# Patient Record
Sex: MALE | Race: OTHER | NOT HISPANIC OR LATINO | URBAN - METROPOLITAN AREA
[De-identification: names, ages, dates, MRNs, and addresses within clinical notes are randomized per-mention and may not be internally consistent; named-entity substitution may affect disease eponyms.]

---

## 2020-12-15 ENCOUNTER — INPATIENT (INPATIENT)
Facility: HOSPITAL | Age: 65
LOS: 2 days | Discharge: HOME | End: 2020-12-18
Attending: STUDENT IN AN ORGANIZED HEALTH CARE EDUCATION/TRAINING PROGRAM | Admitting: STUDENT IN AN ORGANIZED HEALTH CARE EDUCATION/TRAINING PROGRAM
Payer: MEDICARE

## 2020-12-15 VITALS
TEMPERATURE: 98 F | HEART RATE: 105 BPM | RESPIRATION RATE: 18 BRPM | DIASTOLIC BLOOD PRESSURE: 67 MMHG | OXYGEN SATURATION: 98 % | SYSTOLIC BLOOD PRESSURE: 112 MMHG

## 2020-12-15 DIAGNOSIS — I10 ESSENTIAL (PRIMARY) HYPERTENSION: ICD-10-CM

## 2020-12-15 DIAGNOSIS — E11.9 TYPE 2 DIABETES MELLITUS WITHOUT COMPLICATIONS: ICD-10-CM

## 2020-12-15 DIAGNOSIS — Z94.0 KIDNEY TRANSPLANT STATUS: ICD-10-CM

## 2020-12-15 DIAGNOSIS — Z02.9 ENCOUNTER FOR ADMINISTRATIVE EXAMINATIONS, UNSPECIFIED: ICD-10-CM

## 2020-12-15 DIAGNOSIS — E87.5 HYPERKALEMIA: ICD-10-CM

## 2020-12-15 DIAGNOSIS — Z29.9 ENCOUNTER FOR PROPHYLACTIC MEASURES, UNSPECIFIED: ICD-10-CM

## 2020-12-15 DIAGNOSIS — N17.9 ACUTE KIDNEY FAILURE, UNSPECIFIED: ICD-10-CM

## 2020-12-15 DIAGNOSIS — Z95.1 PRESENCE OF AORTOCORONARY BYPASS GRAFT: Chronic | ICD-10-CM

## 2020-12-15 DIAGNOSIS — K92.0 HEMATEMESIS: ICD-10-CM

## 2020-12-15 DIAGNOSIS — I25.10 ATHEROSCLEROTIC HEART DISEASE OF NATIVE CORONARY ARTERY WITHOUT ANGINA PECTORIS: ICD-10-CM

## 2020-12-15 DIAGNOSIS — Z94.0 KIDNEY TRANSPLANT STATUS: Chronic | ICD-10-CM

## 2020-12-15 LAB
ADD ON TEST-SPECIMEN IN LAB: SIGNIFICANT CHANGE UP
ALBUMIN SERPL ELPH-MCNC: 4 G/DL — SIGNIFICANT CHANGE UP (ref 3.3–5)
ALP SERPL-CCNC: 73 U/L — SIGNIFICANT CHANGE UP (ref 40–120)
ALT FLD-CCNC: 15 U/L — SIGNIFICANT CHANGE UP (ref 4–41)
ANION GAP SERPL CALC-SCNC: 10 MMOL/L — SIGNIFICANT CHANGE UP (ref 7–14)
ANION GAP SERPL CALC-SCNC: 12 MMOL/L — SIGNIFICANT CHANGE UP (ref 7–14)
APPEARANCE UR: CLEAR — SIGNIFICANT CHANGE UP
APTT BLD: 31.5 SEC — SIGNIFICANT CHANGE UP (ref 27–36.3)
AST SERPL-CCNC: 13 U/L — SIGNIFICANT CHANGE UP (ref 4–40)
BASOPHILS # BLD AUTO: 0.06 K/UL — SIGNIFICANT CHANGE UP (ref 0–0.2)
BASOPHILS NFR BLD AUTO: 0.5 % — SIGNIFICANT CHANGE UP (ref 0–2)
BILIRUB SERPL-MCNC: 0.9 MG/DL — SIGNIFICANT CHANGE UP (ref 0.2–1.2)
BILIRUB UR-MCNC: NEGATIVE — SIGNIFICANT CHANGE UP
BLD GP AB SCN SERPL QL: NEGATIVE — SIGNIFICANT CHANGE UP
BLOOD GAS VENOUS COMPREHENSIVE RESULT: SIGNIFICANT CHANGE UP
BUN SERPL-MCNC: 66 MG/DL — HIGH (ref 7–23)
BUN SERPL-MCNC: 72 MG/DL — HIGH (ref 7–23)
CALCIUM SERPL-MCNC: 9.1 MG/DL — SIGNIFICANT CHANGE UP (ref 8.4–10.5)
CALCIUM SERPL-MCNC: 9.3 MG/DL — SIGNIFICANT CHANGE UP (ref 8.4–10.5)
CHLORIDE SERPL-SCNC: 101 MMOL/L — SIGNIFICANT CHANGE UP (ref 98–107)
CHLORIDE SERPL-SCNC: 105 MMOL/L — SIGNIFICANT CHANGE UP (ref 98–107)
CK MB BLD-MCNC: 8.4 % — HIGH (ref 0–2.5)
CK MB CFR SERPL CALC: 5.6 NG/ML — SIGNIFICANT CHANGE UP
CK SERPL-CCNC: 67 U/L — SIGNIFICANT CHANGE UP (ref 30–200)
CO2 SERPL-SCNC: 24 MMOL/L — SIGNIFICANT CHANGE UP (ref 22–31)
CO2 SERPL-SCNC: 24 MMOL/L — SIGNIFICANT CHANGE UP (ref 22–31)
COLOR SPEC: YELLOW — SIGNIFICANT CHANGE UP
CREAT SERPL-MCNC: 1.64 MG/DL — HIGH (ref 0.5–1.3)
CREAT SERPL-MCNC: 1.67 MG/DL — HIGH (ref 0.5–1.3)
DIFF PNL FLD: NEGATIVE — SIGNIFICANT CHANGE UP
EOSINOPHIL # BLD AUTO: 0.03 K/UL — SIGNIFICANT CHANGE UP (ref 0–0.5)
EOSINOPHIL NFR BLD AUTO: 0.2 % — SIGNIFICANT CHANGE UP (ref 0–6)
GLUCOSE BLDC GLUCOMTR-MCNC: 278 MG/DL — HIGH (ref 70–99)
GLUCOSE SERPL-MCNC: 110 MG/DL — HIGH (ref 70–99)
GLUCOSE SERPL-MCNC: 280 MG/DL — HIGH (ref 70–99)
GLUCOSE UR QL: ABNORMAL
HCT VFR BLD CALC: 36.4 % — LOW (ref 39–50)
HGB BLD-MCNC: 11.3 G/DL — LOW (ref 13–17)
IANC: 9.11 K/UL — HIGH (ref 1.5–8.5)
IMM GRANULOCYTES NFR BLD AUTO: 0.5 % — SIGNIFICANT CHANGE UP (ref 0–1.5)
INR BLD: 1.38 RATIO — HIGH (ref 0.88–1.17)
KETONES UR-MCNC: ABNORMAL
LEUKOCYTE ESTERASE UR-ACNC: NEGATIVE — SIGNIFICANT CHANGE UP
LYMPHOCYTES # BLD AUTO: 1.25 K/UL — SIGNIFICANT CHANGE UP (ref 1–3.3)
LYMPHOCYTES # BLD AUTO: 9.9 % — LOW (ref 13–44)
MCHC RBC-ENTMCNC: 28.4 PG — SIGNIFICANT CHANGE UP (ref 27–34)
MCHC RBC-ENTMCNC: 31 GM/DL — LOW (ref 32–36)
MCV RBC AUTO: 91.5 FL — SIGNIFICANT CHANGE UP (ref 80–100)
MONOCYTES # BLD AUTO: 2.13 K/UL — HIGH (ref 0–0.9)
MONOCYTES NFR BLD AUTO: 16.9 % — HIGH (ref 2–14)
NEUTROPHILS # BLD AUTO: 9.11 K/UL — HIGH (ref 1.8–7.4)
NEUTROPHILS NFR BLD AUTO: 72 % — SIGNIFICANT CHANGE UP (ref 43–77)
NITRITE UR-MCNC: NEGATIVE — SIGNIFICANT CHANGE UP
NRBC # BLD: 0 /100 WBCS — SIGNIFICANT CHANGE UP
NRBC # FLD: 0 K/UL — SIGNIFICANT CHANGE UP
NT-PROBNP SERPL-SCNC: 2888 PG/ML — HIGH
PH UR: 5.5 — SIGNIFICANT CHANGE UP (ref 5–8)
PLATELET # BLD AUTO: 133 K/UL — LOW (ref 150–400)
POTASSIUM SERPL-MCNC: 4.8 MMOL/L — SIGNIFICANT CHANGE UP (ref 3.5–5.3)
POTASSIUM SERPL-MCNC: 6.2 MMOL/L — CRITICAL HIGH (ref 3.5–5.3)
POTASSIUM SERPL-SCNC: 4.8 MMOL/L — SIGNIFICANT CHANGE UP (ref 3.5–5.3)
POTASSIUM SERPL-SCNC: 6.2 MMOL/L — CRITICAL HIGH (ref 3.5–5.3)
PROT SERPL-MCNC: 6.1 G/DL — SIGNIFICANT CHANGE UP (ref 6–8.3)
PROT UR-MCNC: ABNORMAL
PROTHROM AB SERPL-ACNC: 15.6 SEC — HIGH (ref 9.8–13.1)
RBC # BLD: 3.98 M/UL — LOW (ref 4.2–5.8)
RBC # FLD: 15 % — HIGH (ref 10.3–14.5)
RH IG SCN BLD-IMP: POSITIVE — SIGNIFICANT CHANGE UP
SARS-COV-2 RNA SPEC QL NAA+PROBE: SIGNIFICANT CHANGE UP
SODIUM SERPL-SCNC: 135 MMOL/L — SIGNIFICANT CHANGE UP (ref 135–145)
SODIUM SERPL-SCNC: 141 MMOL/L — SIGNIFICANT CHANGE UP (ref 135–145)
SP GR SPEC: 1.02 — SIGNIFICANT CHANGE UP (ref 1.01–1.02)
TROPONIN T, HIGH SENSITIVITY RESULT: 30 NG/L — SIGNIFICANT CHANGE UP
TROPONIN T, HIGH SENSITIVITY RESULT: 36 NG/L — SIGNIFICANT CHANGE UP
UROBILINOGEN FLD QL: SIGNIFICANT CHANGE UP
WBC # BLD: 12.64 K/UL — HIGH (ref 3.8–10.5)
WBC # FLD AUTO: 12.64 K/UL — HIGH (ref 3.8–10.5)

## 2020-12-15 PROCEDURE — 71045 X-RAY EXAM CHEST 1 VIEW: CPT | Mod: 26

## 2020-12-15 PROCEDURE — 76770 US EXAM ABDO BACK WALL COMP: CPT | Mod: 26

## 2020-12-15 PROCEDURE — 99285 EMERGENCY DEPT VISIT HI MDM: CPT

## 2020-12-15 RX ORDER — MAGNESIUM HYDROXIDE 400 MG/1
30 TABLET, CHEWABLE ORAL DAILY
Refills: 0 | Status: DISCONTINUED | OUTPATIENT
Start: 2020-12-15 | End: 2020-12-18

## 2020-12-15 RX ORDER — ATORVASTATIN CALCIUM 80 MG/1
20 TABLET, FILM COATED ORAL AT BEDTIME
Refills: 0 | Status: DISCONTINUED | OUTPATIENT
Start: 2020-12-15 | End: 2020-12-18

## 2020-12-15 RX ORDER — DEXTROSE 50 % IN WATER 50 %
15 SYRINGE (ML) INTRAVENOUS ONCE
Refills: 0 | Status: DISCONTINUED | OUTPATIENT
Start: 2020-12-15 | End: 2020-12-18

## 2020-12-15 RX ORDER — GLUCAGON INJECTION, SOLUTION 0.5 MG/.1ML
1 INJECTION, SOLUTION SUBCUTANEOUS ONCE
Refills: 0 | Status: DISCONTINUED | OUTPATIENT
Start: 2020-12-15 | End: 2020-12-18

## 2020-12-15 RX ORDER — TACROLIMUS 5 MG/1
1 CAPSULE ORAL EVERY 12 HOURS
Refills: 0 | Status: DISCONTINUED | OUTPATIENT
Start: 2020-12-15 | End: 2020-12-18

## 2020-12-15 RX ORDER — DEXTROSE 50 % IN WATER 50 %
25 SYRINGE (ML) INTRAVENOUS ONCE
Refills: 0 | Status: DISCONTINUED | OUTPATIENT
Start: 2020-12-15 | End: 2020-12-18

## 2020-12-15 RX ORDER — SODIUM CHLORIDE 9 MG/ML
1000 INJECTION INTRAMUSCULAR; INTRAVENOUS; SUBCUTANEOUS
Refills: 0 | Status: DISCONTINUED | OUTPATIENT
Start: 2020-12-15 | End: 2020-12-16

## 2020-12-15 RX ORDER — FUROSEMIDE 40 MG
40 TABLET ORAL ONCE
Refills: 0 | Status: COMPLETED | OUTPATIENT
Start: 2020-12-15 | End: 2020-12-15

## 2020-12-15 RX ORDER — ACETAMINOPHEN 500 MG
975 TABLET ORAL ONCE
Refills: 0 | Status: COMPLETED | OUTPATIENT
Start: 2020-12-15 | End: 2020-12-15

## 2020-12-15 RX ORDER — MYCOPHENOLATE MOFETIL 250 MG/1
500 CAPSULE ORAL
Refills: 0 | Status: DISCONTINUED | OUTPATIENT
Start: 2020-12-15 | End: 2020-12-17

## 2020-12-15 RX ORDER — INSULIN GLARGINE 100 [IU]/ML
8 INJECTION, SOLUTION SUBCUTANEOUS AT BEDTIME
Refills: 0 | Status: DISCONTINUED | OUTPATIENT
Start: 2020-12-15 | End: 2020-12-15

## 2020-12-15 RX ORDER — FOLIC ACID 0.8 MG
1 TABLET ORAL DAILY
Refills: 0 | Status: DISCONTINUED | OUTPATIENT
Start: 2020-12-15 | End: 2020-12-18

## 2020-12-15 RX ORDER — INSULIN LISPRO 100/ML
VIAL (ML) SUBCUTANEOUS EVERY 6 HOURS
Refills: 0 | Status: DISCONTINUED | OUTPATIENT
Start: 2020-12-15 | End: 2020-12-16

## 2020-12-15 RX ORDER — SODIUM ZIRCONIUM CYCLOSILICATE 10 G/10G
10 POWDER, FOR SUSPENSION ORAL ONCE
Refills: 0 | Status: COMPLETED | OUTPATIENT
Start: 2020-12-15 | End: 2020-12-15

## 2020-12-15 RX ORDER — CHOLECALCIFEROL (VITAMIN D3) 125 MCG
1 CAPSULE ORAL
Qty: 0 | Refills: 0 | DISCHARGE

## 2020-12-15 RX ORDER — INSULIN HUMAN 100 [IU]/ML
5 INJECTION, SOLUTION SUBCUTANEOUS ONCE
Refills: 0 | Status: COMPLETED | OUTPATIENT
Start: 2020-12-15 | End: 2020-12-15

## 2020-12-15 RX ORDER — PANTOPRAZOLE SODIUM 20 MG/1
40 TABLET, DELAYED RELEASE ORAL EVERY 12 HOURS
Refills: 0 | Status: DISCONTINUED | OUTPATIENT
Start: 2020-12-15 | End: 2020-12-16

## 2020-12-15 RX ORDER — SODIUM CHLORIDE 9 MG/ML
1000 INJECTION, SOLUTION INTRAVENOUS
Refills: 0 | Status: DISCONTINUED | OUTPATIENT
Start: 2020-12-15 | End: 2020-12-18

## 2020-12-15 RX ORDER — DEXTROSE 50 % IN WATER 50 %
12.5 SYRINGE (ML) INTRAVENOUS ONCE
Refills: 0 | Status: DISCONTINUED | OUTPATIENT
Start: 2020-12-15 | End: 2020-12-18

## 2020-12-15 RX ORDER — PANTOPRAZOLE SODIUM 20 MG/1
80 TABLET, DELAYED RELEASE ORAL ONCE
Refills: 0 | Status: COMPLETED | OUTPATIENT
Start: 2020-12-15 | End: 2020-12-15

## 2020-12-15 RX ORDER — ONDANSETRON 8 MG/1
4 TABLET, FILM COATED ORAL ONCE
Refills: 0 | Status: COMPLETED | OUTPATIENT
Start: 2020-12-15 | End: 2020-12-15

## 2020-12-15 RX ORDER — CHOLECALCIFEROL (VITAMIN D3) 125 MCG
2000 CAPSULE ORAL DAILY
Refills: 0 | Status: DISCONTINUED | OUTPATIENT
Start: 2020-12-15 | End: 2020-12-18

## 2020-12-15 RX ADMIN — Medication 40 MILLIGRAM(S): at 20:38

## 2020-12-15 RX ADMIN — INSULIN HUMAN 5 UNIT(S): 100 INJECTION, SOLUTION SUBCUTANEOUS at 19:22

## 2020-12-15 RX ADMIN — Medication 975 MILLIGRAM(S): at 19:22

## 2020-12-15 RX ADMIN — ONDANSETRON 4 MILLIGRAM(S): 8 TABLET, FILM COATED ORAL at 19:21

## 2020-12-15 RX ADMIN — SODIUM CHLORIDE 50 MILLILITER(S): 9 INJECTION INTRAMUSCULAR; INTRAVENOUS; SUBCUTANEOUS at 23:35

## 2020-12-15 RX ADMIN — PANTOPRAZOLE SODIUM 80 MILLIGRAM(S): 20 TABLET, DELAYED RELEASE ORAL at 19:22

## 2020-12-15 RX ADMIN — SODIUM ZIRCONIUM CYCLOSILICATE 10 GRAM(S): 10 POWDER, FOR SUSPENSION ORAL at 19:22

## 2020-12-15 NOTE — ED ADULT NURSE NOTE - OBJECTIVE STATEMENT
pt received to room 2, a&ox 3, ambulatory, pmh of HTN, p/w vomiting blood x1 this morning. pt reports large amount of vomit with dark blood. pt reports nausea before vomiting, denies nausea at this time. pt reports 2BM yesterday, denies dark tarry stools. Pt breathing even and unlabored on room air. NSR on cardiac monitor. Denies fever, chills, cough, SOB, chest pain, palpitations, dizziness, numbness, tingling. EKG in chart. pending lab collection.

## 2020-12-15 NOTE — ED PROVIDER NOTE - CLINICAL SUMMARY MEDICAL DECISION MAKING FREE TEXT BOX
65M hx triple bypass and kidney transplant 4-5 yrs ago on prograf daily presents with hematemesis x1 this AM and back pain. poor appetite since transplant. US transplanted kidney to eval for rejection, likely upper gi bleed so give protonix and zofran, Plan: Cardiac Monitor, EKG, Labs/cardiac enzymes, CXR and admit to telemetry for further workup. Lito att: 65M hx triple bypass and kidney transplant 4-5 yrs ago on prograf daily presents with hematemesis x1 this AM and back pain. poor appetite since transplant. US transplanted kidney to eval for rejection, likely upper gi bleed so give protonix and zofran, Plan: Cardiac Monitor, EKG, Labs/cardiac enzymes, CXR and admit to telemetry for further workup.

## 2020-12-15 NOTE — H&P ADULT - PROBLEM SELECTOR PLAN 7
Reportedly taking isosorbide 60mg and carvedilol 25mg BID  - BP stable on this admission, unclear baseline BP  - hold anti-hypertensives in setting of acute upper GI bleed Reportedly taking isosorbide 60mg and carvedilol 25mg BID  - BP stable on this admission, unclear baseline BP  - hold anti-hypertensives in setting of acute upper GI bleed  - restart BP if hypertensive overnight

## 2020-12-15 NOTE — H&P ADULT - NSHPPHYSICALEXAM_GEN_ALL_CORE
Vital Signs (24 Hrs):  T(C): 37.2 (12-15-20 @ 20:48), Max: 37.2 (12-15-20 @ 20:48)  HR: 91 (12-15-20 @ 20:48) (91 - 105)  BP: 124/65 (12-15-20 @ 20:48) (112/67 - 204/133)  RR: 16 (12-15-20 @ 20:48) (16 - 18)  SpO2: 99% (12-15-20 @ 20:48) (98% - 100%)  Wt(kg): --  PHYSICAL EXAM:  GENERAL: NAD, lying in bed comfortably  HEAD:  Atraumatic, Normocephalic  EYES: EOMI, PERRLA, conjunctiva and sclera clear  ENT: Moist mucous membranes  NECK: Supple, No JVD  CHEST/LUNG: Clear to auscultation bilaterally; No rales, rhonchi, wheezing, or rubs. Unlabored respirations  HEART: Regular rate and rhythm; No murmurs, rubs, or gallops  ABDOMEN: Bowel sounds present; Soft, Nontender, Nondistended   EXTREMITIES:  2+ Peripheral Pulses, brisk capillary refill. No clubbing, cyanosis, or edema  NERVOUS SYSTEM:  Alert & Oriented X3, speech clear. No deficits   MSK: FROM all 4 extremities, full and equal strength  SKIN: No rashes or lesions, LUE fistula thrill present

## 2020-12-15 NOTE — H&P ADULT - PROBLEM SELECTOR PLAN 3
Unclear of baseline Crt, all records from [facility], but pt reportedl still urinating  - Check urine lytes for now, kidney US showing no rejection   - monitor on gentle hydration while NPO  - trend tacro level   - Obtain records from [facility]  - f/u renal c/s

## 2020-12-15 NOTE — ED PROVIDER NOTE - CARE PLAN
Principal Discharge DX:	Hematemesis  Secondary Diagnosis:	Back pain   Principal Discharge DX:	Hematemesis  Secondary Diagnosis:	Back pain  Secondary Diagnosis:	Hyperkalemia

## 2020-12-15 NOTE — ED ADULT NURSE REASSESSMENT NOTE - NS ED NURSE REASSESS COMMENT FT1
Report received from day RN. Pt in no acute distress and offering no complaints. Pt medicated with Lasix per Md orders. Repeat BMP drawn and sent. Will continue to monitor.

## 2020-12-15 NOTE — H&P ADULT - NSHPSOCIALHISTORY_GEN_ALL_CORE
Pt denies smoking, drinking, recreational drug use, lives at home w/ wife, son and daughter-in-law, currently retired.

## 2020-12-15 NOTE — H&P ADULT - NSRESEARCHGRANTASSESS_GEN_A_CORE
Not applicable: This is a surgical and/or non-medical patient <<--- Click to launch IMPROVE-DD VTE Assessment

## 2020-12-15 NOTE — H&P ADULT - PROBLEM SELECTOR PLAN 1
1x episode of hematemesis, Hg stable at 11, although unclear baseline, no prior events, Likely upper GI bleeding  - GI c/s placed, possible plan for EGD tmrw in AM  - protonix 40mg IV BID, if another episode of hematemesis, transition to protonoix gtt  - NPO for now  - Two large-bore IV  - Maintain active T+S  - start maintenance fluids w/ NS at 50cc/hr while NPO  - q8 CBC for now, transfuse to HG >8 1x episode of hematemesis, Hg stable at 11, although unclear baseline, no prior events, Likely upper GI bleeding  - GI c/s placed, possible plan for EGD tmrw in AM  - protonix 40mg IV BID, if another episode of hematemesis, transition to protonoix gtt  - NPO for now  - Two large-bore IV  - Maintain active T+S  - start maintenance fluids w/ NS at 50cc/hr while NPO  - q8 CBC for now, transfuse to HG >8  - hold ASA

## 2020-12-15 NOTE — H&P ADULT - PROBLEM SELECTOR PLAN 9
Problem: Discharge planning issues. Plan; Transitions of Care Status:  1. Name of PCP:  2. PCP contacted on Admission: []Y []N  3. PCP contacted at Discharge: []Y []N  4. Post-Discharge Appointment Date and Location:   5. Summary of Handoff given to PCP: Problem: Discharge planning issues. Plan; Transitions of Care Status:  1. Name of PCP: Barbara Fontaine?   2. PCP contacted on Admission: []Y []N  3. PCP contacted at Discharge: []Y []N  4. Post-Discharge Appointment Date and Location:   5. Summary of Handoff given to PCP:

## 2020-12-15 NOTE — ED PROVIDER NOTE - OBJECTIVE STATEMENT
65M hx triple bypass on daily asa and kidney transplant 4-5 yrs ago on prograf daily presents with hematemesis x1 this AM and back pain. poor appetite since transplant. Patient denies chest pain, SOB, f/c, cough, abd pain, Diarrhea/Constipation, weakness, HA, dizziness, urinary symptoms, extremity pain or swelling or other complaints.

## 2020-12-15 NOTE — H&P ADULT - PROBLEM SELECTOR PLAN 6
H/o T2DM, reportedly taking lantus 16 units and novolog 4units premeals  - f/u HgA1C  - monitor on low dose ISS q6 while NPO  - lantus 8 units bedtime while NPO H/o T2DM, reportedly taking lantus 16 units and novolog 4units premeals  - f/u HgA1C  - monitor on low dose ISS q6 while NPO  - hold lantus for now H/o T2DM, reportedly taking lantus 16 units and novolog 4units premeals  - f/u HgA1C  - monitor on low dose ISS q6 while NPO  - lantus 8 while NPO H/o T2DM, reportedly taking lantus 16 units and novolog 4units premeals  - f/u HgA1C  - monitor on low dose ISS q6 while NPO  - Lantus 8 units x 1 SQ tonight, while NPO

## 2020-12-15 NOTE — H&P ADULT - HISTORY OF PRESENT ILLNESS
Pt is a 64 yo M w/ h/o CAD w/ 1x NATALIE, CABG in 2005, R kidney transplant in 2015 on prograf and cellcept, DM, HTN presenting for 1x episode of hematemesis. Pt reports waking this morning w/ 1 episode of "small amount" of bloody vomit. Pt reports that this has never happened to him before. Pt reports that he is only on ASA since the CABG, and otherwise he is not on any other blood thinners. Pt reports that he got both of his CABG and kidney transplant in [facility] (no records in [facility]IE or allscripts). Pt otherwise is complaining of mild lower back pain. Otherwise, denies fevers, chills, chest p/p, sob, diarrhea, sick contacts, recent travel.     At the ED, pt vitals: /67, , saturating well on RA, afebrile. CBC sig for mild leukocytosis to 12, Hg 11 (unclear baseline), CMP sig for K of 6.2, Crt 1.64 (unclear baseline), proBNP elevated at 2888. US kidney showing no rejection, CXR clear. Given insulin 5units, lasix 40mg IV, lokelma 10, admitted to medicine for further observation/workup Pt is a 64 yo M w/ h/o CAD w/ 1x NATALIE, CABG in 2005, R kidney transplant in 2015 on prednisone, prograf and cellcept, T2DM, HTN presenting for 1x episode of hematemesis. Pt reports waking this morning w/ 1 episode of "small amount" of bloody vomit. Pt reports that this has never happened to him before. Pt reports that he is only on ASA since the CABG, and otherwise he is not on any other blood thinners. Pt reports that he got both of his CABG and kidney transplant in [place], [place] (no records in HIE or allscripts). Pt otherwise is complaining of mild lower back pain. Otherwise, denies fevers, chills, chest p/p, sob, diarrhea, sick contacts, recent travel.     At the ED, pt vitals: /67, , saturating well on RA, afebrile. CBC sig for mild leukocytosis to 12, Hg 11 (unclear baseline), CMP sig for K of 6.2, Crt 1.64 (unclear baseline), proBNP elevated at 2888. US kidney showing no rejection, CXR clear. Pt seen by renal, given insulin 5units, lasix 40mg IV, lokelma 10, admitted to medicine for further observation/workup

## 2020-12-15 NOTE — H&P ADULT - PROBLEM SELECTOR PLAN 8
GOC: full code  DVT prophylaxis: SCDS for now  Activity: ambulate as tolerated  Diet: NPO except meds GOC: full code  DVT prophylaxis: SCDS for now  Activity: ambulate as tolerated  Diet: NPO except meds  Fall, Asp precaution

## 2020-12-15 NOTE — H&P ADULT - PROBLEM SELECTOR PLAN 5
Transplanted in 2015, on immunosuppresives at home  - Kidney US showing no rejection  - c/w prograf 1mg BID, cellcept 500mg BID, prednisone 2.5 BID  - trend tacro level  - f/u transplant renal c/s

## 2020-12-15 NOTE — H&P ADULT - NSHPLABSRESULTS_GEN_ALL_CORE
LABS: Personally reviewed labs, imaging, and ECG                          11.3   12.64 )-----------( 133      ( 15 Dec 2020 17:39 )             36.4       12-15    141  |  105  |  72<H>  ----------------------------<  110<H>  4.8   |  24  |  1.67<H>    Ca    9.3      15 Dec 2020 21:29    TPro  6.1  /  Alb  4.0  /  TBili  0.9  /  DBili  x   /  AST  13  /  ALT  15  /  AlkPhos  73  12-15     LIVER FUNCTIONS - ( 15 Dec 2020 17:39 )  Alb: 4.0 g/dL / Pro: 6.1 g/dL / ALK PHOS: 73 U/L / ALT: 15 U/L / AST: 13 U/L / GGT: x           Urinalysis Basic - ( 15 Dec 2020 18:54 )    Color: Yellow / Appearance: Clear / S.025 / pH: x  Gluc: x / Ketone: Small  / Bili: Negative / Urobili: <2 mg/dL   Blood: x / Protein: Trace / Nitrite: Negative   Leuk Esterase: Negative / RBC: x / WBC x   Sq Epi: x / Non Sq Epi: x / Bacteria: x    PT/INR - ( 15 Dec 2020 17:39 )   PT: 15.6 sec;   INR: 1.38 ratio         PTT - ( 15 Dec 2020 17:39 )  PTT:31.5 sec    Lactate Trend    CARDIAC MARKERS ( 15 Dec 2020 19:55 )  x     / x     / 67 U/L / x     / 5.6 ng/mL    CAPILLARY BLOOD GLUCOSE    POCT Blood Glucose.: 248 mg/dL (15 Dec 2020 19:21)    RADIOLOGY & ADDITIONAL TESTS:

## 2020-12-15 NOTE — H&P ADULT - ATTENDING COMMENTS
60 y/o male HX of CAD, S/P Stent x 1, CABG, Rt Kidney Transplant on PO Prednisone, Prograf, Cellcept, DM type 2, HTN, high Cholesterol , pt c/o Hematemesis x 1 episode, pt is on ASA, Mild LBP, no fever, NO CP, NO SOB, no diarrhea, + Mild Leukocytosis, + Hyperkalemia, IZZY vs. CKD, Elevated BNP, Pt s/p Insulin 5 units x 1 IVP, Lasix 40 mg IVP x 1, Lokelma 10 gm po X 1, HOUSE [facility]al & [facility] were called tonight, Renal sonogram: No Hydro, No stone, s/P Rt Renal Transplant,  Mg 1.5 and Phos 1.3, , BUN 80, Creatinine 1.67, COVID 19 PCR Neg., BNP 2888, K+ 6.2--->5.0, WBC 12.64, Hgb 11.3, Platelet 133,     Fall/aspiration precaution, IVF NS @ 50 cc/hr x 12 HR, ECHO, Protonix 40 mg IVP BID, NPO Except meds, f/u CBC Closely, Hold ASA for now, Venodyne for DVT Prophylaxis, Hold Pre Meal Insulin, F/U CBC, CMP, Mg, Phos, Hep A,B,C profile, HgbA1c, TSH, Free T4, Fasting Lipid, Iron studies, Ferritin, Vit B12, Folate,   on Lipitor, on Prograf, Prednisone, Cellcept, Lantus 8 units SQ x 1 tonight, Hold BP Meds due to low BP and possible GI Bleed, Type & screen, Prograf level, PT consult,    Case D/w Pt and HS,    Pt was seen & evaluated by me, Dr. LAMIN Adams on 12/16/2020.

## 2020-12-15 NOTE — H&P ADULT - NSICDXPASTMEDICALHX_GEN_ALL_CORE_FT
PAST MEDICAL HISTORY:  CAD (coronary artery disease)      PAST MEDICAL HISTORY:  CAD (coronary artery disease)     DM (diabetes mellitus) Type 2    High cholesterol     HTN (hypertension)

## 2020-12-15 NOTE — H&P ADULT - PROBLEM SELECTOR PLAN 4
Reportedly had MI in the past w/ 1x NATALIE, s/p CABG, on ASA at home  - hold ASA in setting of GI bleed  - c/w atorvastatin for now

## 2020-12-15 NOTE — H&P ADULT - PROBLEM SELECTOR PLAN 2
Found to have potassium of 6.2 at ED, EKG wnl, possibly 2/2 in setting of ?IZZY on CKD, vs ?new IZZY  - s/p insulin, and 40mg lasix, lokelma 10mg x1  - f/u repeat BMP  - f/u renal c/s

## 2020-12-15 NOTE — ED PROVIDER NOTE - NS ED ROS FT
Constitutional: no fevers, no chills.  Eyes: no visual changes.  Ears: no ear drainage, no ear pain.  Nose: no nasal congestion.  Mouth/Throat: no sore throat.  Cardiovascular: no chest pain.  Respiratory: no shortness of breath, no wheezing, no cough  Gastrointestinal: +nausea, +vomiting, no diarrhea, no abdominal pain.  MSK: no flank pain, +back pain.  Genitourinary: no dysuria, no hematuria.  Skin: no rashes.  Neuro: no headache

## 2020-12-15 NOTE — ED PROVIDER NOTE - PHYSICAL EXAMINATION
GEN: Well appearing, well nourished, in no apparent distress.  HEAD: NCAT  HEENT: PERRL, Airway patent, EOMI, non-erythematous pharynx, no exudates, uvula midline, MMM, neck supple, no LAD, no JVD  LUNG: CTAB, no adventitious sounds, no retractions, no nasal flaring  CV: RRR, no murmurs,   Abd: soft, NTND, no rebound or guarding, BS+ in all quadrants, no CVAT  MSK: WWP, Pulses 2+ in extremities, No edema   Neuro:  AAOx3, Ambulatory with stable gait. JARRELL without laterality  Skin: Warm and dry, no evidence of rash  Psych: normal mood and affect

## 2020-12-15 NOTE — H&P ADULT - NSHPREVIEWOFSYSTEMS_GEN_ALL_CORE
REVIEW OF SYSTEMS:  CONSTITUTIONAL: No weakness, fevers or chills  EYES: No visual changes;  No vertigo   ENT: No throat pain, no runny nose   NECK: No pain or stiffness  RESPIRATORY: No cough, wheezing, hemoptysis; No shortness of breath  CARDIOVASCULAR: No chest pain or palpitations  GASTROINTESTINAL: +hematemesis, No abdominal or epigastric pain. No nausea, vomiting, No diarrhea or constipation. No melena or hematochezia.  GENITOURINARY: No dysuria, frequency or hematuria  NEUROLOGICAL: No numbness or weakness  SKIN: No itching, rashes  MUSCULOSKELETAL: Mild back pain  PSYCH: No mood changes, suicidal ideation

## 2020-12-15 NOTE — CHART NOTE - NSCHARTNOTEFT_GEN_A_CORE
Pt is a 66 y/o M w PMH of kidney transplant 4-5 years ago (on prograf, primary nephrologist in [place]), CAD s/p triple bypass presented to ED c/o of 1 episode of hematemesis this am. Nephrology consulted for transplant management and hyperkalemia.    Upon review of labs on [facility], patient noted to have initial K of 6.2 (non-hemolyzed), no EKG changes as per primary team, with Scr of 1.64. S/p Lokelma 10g and insulin 5 unit IVP. Repeat K on [facility] noted to be 4.9. Kidney u/s done w transplant kidney on RLQ w/o hydronephrosis or stone.     As per ED team, patient appears to stable. VS noted significantly elevated BP of 204/133?, , O2 sat 100 percent on RA, RR of 17. As per ED note, patient does not c/o of HA or SOB. C/o of nausea/vomiting.     Recommendations  Continue to monitor K level  Obtain baseline Scr   Suggest to give 1 dose of lasix 40 mg IVP if patient is not clinically hypovolemic   Obtain tacrolimus level   Nephrology will follow  Will consult note to follow tomorrow    If any questions, please feel free to contact me     Michelle Muniz  Nephrology Fellow  [facility] Pager: [phone]  [facility] Pager: [phone] Pt is a 66 y/o M w PMH of kidney transplant 4-5 years ago (on prograf, primary nephrologist in [place]), CAD s/p triple bypass presented to ED c/o of 1 episode of hematemesis this am. Nephrology consulted for transplant management and hyperkalemia.    Upon review of labs on [facility], patient noted to have initial K of 6.2 (non-hemolyzed), no EKG changes as per primary team, with Scr of 1.64. S/p Lokelma 10g and insulin 5 unit IVP. Repeat K on [facility] noted to be 4.9. Kidney u/s done w transplant kidney on RLQ w/o hydronephrosis or stone.     As per ED team, patient appears to stable. VS noted significantly elevated BP of 204/133?, , O2 sat 100 percent on RA, RR of 17. As per ED note, patient does not c/o of HA or SOB. C/o of nausea/vomiting.     Recommendations  Continue to monitor K level  Obtain baseline Scr   Suggest to give 1 dose of lasix 40 mg IVP if patient is not clinically hypovolemic   Obtain tacrolimus trough level   Reconcile medications  Resume transplant home medications  Resume home BP meds   Nephrology will follow  Full consult note to follow tomorrow    If any questions, please feel free to contact me     Michelle Muniz  Nephrology Fellow  [facility] Pager: [phone]  [facility] Pager: [phone] Pt is a 66 y/o M w PMH of kidney transplant 4-5 years ago (on prograf, primary nephrologist in [place]), CAD s/p triple bypass presented to ED c/o of 1 episode of hematemesis this am. Nephrology consulted for transplant management and hyperkalemia.    Upon review of labs on [facility], patient noted to have initial K of 6.2 (non-hemolyzed), no EKG changes as per primary team, with Scr of 1.64. S/p Lokelma 10g and insulin 5 unit IVP. Repeat K on [facility] noted to be 4.9. Kidney u/s done w transplant kidney on RLQ w/o hydronephrosis or stone.     As per ED team, patient appears to stable. VS noted significantly elevated BP of 204/133?, , O2 sat 100 percent on RA, RR of 17. As per ED note, patient does not c/o of HA or SOB. C/o of nausea/vomiting.     Assessment  Hyperkalemia 2/2 to UGIB, IZZY?/CKD   S/p kidney transplant   IZZY? vs CKD? no baseline Scr   HTN, uncontrolled       Recommendations  Continue to monitor K level  Obtain baseline Scr   Suggest to give 1 dose of lasix 40 mg IVP if patient is not clinically hypovolemic   Obtain tacrolimus trough level   Reconcile medications  Resume transplant home medications  Resume home BP meds   Nephrology will follow  Full consult note to follow tomorrow    If any questions, please feel free to contact me     Michelle Muniz  Nephrology Fellow  [facility] Pager: [phone]  [facility] Pager: [phone]

## 2020-12-15 NOTE — ED ADULT NURSE REASSESSMENT NOTE - NS ED NURSE REASSESS COMMENT FT1
pt noted to have old AVF to left upper arm. +bruit/+thrill. extremity band applied. pt now reporting he last receieved HD 4 years ago. pt s/p renal transplant on prograf. pt also reports back pain at this time. dr. lisa vallejo.

## 2020-12-15 NOTE — H&P ADULT - ASSESSMENT
Pt is a 66 yo M w/ h/o CAD w/ 1x NATALIE, CABG in 2005, R kidney transplant in 2015 on prograf and cellcept, DM, HTN presenting for 1x episode of hematemesis, currently hemodynamically stable,  GI c/s placed, plan for possible EGD tomorrow morning.

## 2020-12-15 NOTE — ED ADULT TRIAGE NOTE - CHIEF COMPLAINT QUOTE
p/t with hx ESRD, s/p kidney transplant few years ago c/o of vomited blood with clots this am, p/t denies any abd pain denies chest pain,

## 2020-12-16 DIAGNOSIS — Z94.0 KIDNEY TRANSPLANT STATUS: ICD-10-CM

## 2020-12-16 DIAGNOSIS — E87.6 HYPOKALEMIA: ICD-10-CM

## 2020-12-16 DIAGNOSIS — E53.8 DEFICIENCY OF OTHER SPECIFIED B GROUP VITAMINS: ICD-10-CM

## 2020-12-16 DIAGNOSIS — N19 UNSPECIFIED KIDNEY FAILURE: ICD-10-CM

## 2020-12-16 LAB
A1C WITH ESTIMATED AVERAGE GLUCOSE RESULT: 7.1 % — HIGH (ref 4–5.6)
ALBUMIN SERPL ELPH-MCNC: 3 G/DL — LOW (ref 3.3–5)
ALP SERPL-CCNC: 53 U/L — SIGNIFICANT CHANGE UP (ref 40–120)
ALT FLD-CCNC: 11 U/L — SIGNIFICANT CHANGE UP (ref 4–41)
ANION GAP SERPL CALC-SCNC: 10 MMOL/L — SIGNIFICANT CHANGE UP (ref 7–14)
ANION GAP SERPL CALC-SCNC: 12 MMOL/L — SIGNIFICANT CHANGE UP (ref 7–14)
ANION GAP SERPL CALC-SCNC: 13 MMOL/L — SIGNIFICANT CHANGE UP (ref 7–14)
AST SERPL-CCNC: 14 U/L — SIGNIFICANT CHANGE UP (ref 4–40)
BASOPHILS # BLD AUTO: 0 K/UL — SIGNIFICANT CHANGE UP (ref 0–0.2)
BASOPHILS NFR BLD AUTO: 0 % — SIGNIFICANT CHANGE UP (ref 0–2)
BILIRUB SERPL-MCNC: 0.5 MG/DL — SIGNIFICANT CHANGE UP (ref 0.2–1.2)
BUN SERPL-MCNC: 66 MG/DL — HIGH (ref 7–23)
BUN SERPL-MCNC: 73 MG/DL — HIGH (ref 7–23)
BUN SERPL-MCNC: 80 MG/DL — HIGH (ref 7–23)
CALCIUM SERPL-MCNC: 7.6 MG/DL — LOW (ref 8.4–10.5)
CALCIUM SERPL-MCNC: 9.2 MG/DL — SIGNIFICANT CHANGE UP (ref 8.4–10.5)
CALCIUM SERPL-MCNC: 9.2 MG/DL — SIGNIFICANT CHANGE UP (ref 8.4–10.5)
CHLORIDE SERPL-SCNC: 101 MMOL/L — SIGNIFICANT CHANGE UP (ref 98–107)
CHLORIDE SERPL-SCNC: 104 MMOL/L — SIGNIFICANT CHANGE UP (ref 98–107)
CHLORIDE SERPL-SCNC: 109 MMOL/L — HIGH (ref 98–107)
CHOLEST SERPL-MCNC: 72 MG/DL — SIGNIFICANT CHANGE UP
CO2 SERPL-SCNC: 18 MMOL/L — LOW (ref 22–31)
CO2 SERPL-SCNC: 19 MMOL/L — LOW (ref 22–31)
CO2 SERPL-SCNC: 24 MMOL/L — SIGNIFICANT CHANGE UP (ref 22–31)
CREAT ?TM UR-MCNC: 20 MG/DL — SIGNIFICANT CHANGE UP
CREAT SERPL-MCNC: 1.45 MG/DL — HIGH (ref 0.5–1.3)
CREAT SERPL-MCNC: 1.64 MG/DL — HIGH (ref 0.5–1.3)
CREAT SERPL-MCNC: 1.67 MG/DL — HIGH (ref 0.5–1.3)
EOSINOPHIL # BLD AUTO: 0 K/UL — SIGNIFICANT CHANGE UP (ref 0–0.5)
EOSINOPHIL NFR BLD AUTO: 0 % — SIGNIFICANT CHANGE UP (ref 0–6)
ESTIMATED AVERAGE GLUCOSE: 157 MG/DL — HIGH (ref 68–114)
FERRITIN SERPL-MCNC: 1382 NG/ML — HIGH (ref 30–400)
FOLATE SERPL-MCNC: 13.4 NG/ML — SIGNIFICANT CHANGE UP (ref 3.1–17.5)
GLUCOSE BLDC GLUCOMTR-MCNC: 166 MG/DL — HIGH (ref 70–99)
GLUCOSE BLDC GLUCOMTR-MCNC: 173 MG/DL — HIGH (ref 70–99)
GLUCOSE BLDC GLUCOMTR-MCNC: 177 MG/DL — HIGH (ref 70–99)
GLUCOSE SERPL-MCNC: 156 MG/DL — HIGH (ref 70–99)
GLUCOSE SERPL-MCNC: 188 MG/DL — HIGH (ref 70–99)
GLUCOSE SERPL-MCNC: 286 MG/DL — HIGH (ref 70–99)
HAV IGM SER-ACNC: SIGNIFICANT CHANGE UP
HBV CORE IGM SER-ACNC: SIGNIFICANT CHANGE UP
HBV SURFACE AG SER-ACNC: SIGNIFICANT CHANGE UP
HCT VFR BLD CALC: 27.6 % — LOW (ref 39–50)
HCT VFR BLD CALC: 31.8 % — LOW (ref 39–50)
HCV AB S/CO SERPL IA: 0.07 S/CO — SIGNIFICANT CHANGE UP (ref 0–0.99)
HCV AB SERPL-IMP: SIGNIFICANT CHANGE UP
HDLC SERPL-MCNC: 31 MG/DL — LOW
HGB BLD-MCNC: 10 G/DL — LOW (ref 13–17)
HGB BLD-MCNC: 8.6 G/DL — LOW (ref 13–17)
IANC: 5.79 K/UL — SIGNIFICANT CHANGE UP (ref 1.5–8.5)
IRON SATN MFR SERPL: 131 UG/DL — SIGNIFICANT CHANGE UP (ref 45–165)
IRON SATN MFR SERPL: 79 % — HIGH (ref 14–50)
LIPID PNL WITH DIRECT LDL SERPL: 23 MG/DL — SIGNIFICANT CHANGE UP
LYMPHOCYTES # BLD AUTO: 1.5 K/UL — SIGNIFICANT CHANGE UP (ref 1–3.3)
LYMPHOCYTES # BLD AUTO: 16.4 % — SIGNIFICANT CHANGE UP (ref 13–44)
MAGNESIUM SERPL-MCNC: 1.5 MG/DL — LOW (ref 1.6–2.6)
MAGNESIUM SERPL-MCNC: 1.9 MG/DL — SIGNIFICANT CHANGE UP (ref 1.6–2.6)
MAGNESIUM SERPL-MCNC: 2.1 MG/DL — SIGNIFICANT CHANGE UP (ref 1.6–2.6)
MCHC RBC-ENTMCNC: 28.2 PG — SIGNIFICANT CHANGE UP (ref 27–34)
MCHC RBC-ENTMCNC: 28.4 PG — SIGNIFICANT CHANGE UP (ref 27–34)
MCHC RBC-ENTMCNC: 31.2 GM/DL — LOW (ref 32–36)
MCHC RBC-ENTMCNC: 31.4 GM/DL — LOW (ref 32–36)
MCV RBC AUTO: 89.6 FL — SIGNIFICANT CHANGE UP (ref 80–100)
MCV RBC AUTO: 91.1 FL — SIGNIFICANT CHANGE UP (ref 80–100)
MONOCYTES # BLD AUTO: 1.16 K/UL — HIGH (ref 0–0.9)
MONOCYTES NFR BLD AUTO: 12.7 % — SIGNIFICANT CHANGE UP (ref 2–14)
NEUTROPHILS # BLD AUTO: 6.14 K/UL — SIGNIFICANT CHANGE UP (ref 1.8–7.4)
NEUTROPHILS NFR BLD AUTO: 62.7 % — SIGNIFICANT CHANGE UP (ref 43–77)
NON HDL CHOLESTEROL: 41 MG/DL — SIGNIFICANT CHANGE UP
NRBC # BLD: 0 /100 WBCS — SIGNIFICANT CHANGE UP
NRBC # FLD: 0 K/UL — SIGNIFICANT CHANGE UP
OSMOLALITY UR: 381 MOSM/KG — SIGNIFICANT CHANGE UP (ref 50–1200)
PHOSPHATE SERPL-MCNC: 1.3 MG/DL — LOW (ref 2.5–4.5)
PHOSPHATE SERPL-MCNC: 1.9 MG/DL — LOW (ref 2.5–4.5)
PHOSPHATE SERPL-MCNC: 2.9 MG/DL — SIGNIFICANT CHANGE UP (ref 2.5–4.5)
PLATELET # BLD AUTO: 111 K/UL — LOW (ref 150–400)
PLATELET # BLD AUTO: 91 K/UL — LOW (ref 150–400)
POTASSIUM SERPL-MCNC: 3.3 MMOL/L — LOW (ref 3.5–5.3)
POTASSIUM SERPL-MCNC: 4 MMOL/L — SIGNIFICANT CHANGE UP (ref 3.5–5.3)
POTASSIUM SERPL-MCNC: 5 MMOL/L — SIGNIFICANT CHANGE UP (ref 3.5–5.3)
POTASSIUM SERPL-SCNC: 3.3 MMOL/L — LOW (ref 3.5–5.3)
POTASSIUM SERPL-SCNC: 4 MMOL/L — SIGNIFICANT CHANGE UP (ref 3.5–5.3)
POTASSIUM SERPL-SCNC: 5 MMOL/L — SIGNIFICANT CHANGE UP (ref 3.5–5.3)
PROT ?TM UR-MCNC: <4 MG/DL — SIGNIFICANT CHANGE UP
PROT ?TM UR-MCNC: <4 MG/DL — SIGNIFICANT CHANGE UP
PROT SERPL-MCNC: 4.9 G/DL — LOW (ref 6–8.3)
PROT/CREAT UR-RTO: <0.2 RATIO — SIGNIFICANT CHANGE UP (ref 0–0.2)
RBC # BLD: 3.03 M/UL — LOW (ref 4.2–5.8)
RBC # BLD: 3.55 M/UL — LOW (ref 4.2–5.8)
RBC # FLD: 15.1 % — HIGH (ref 10.3–14.5)
RBC # FLD: 15.4 % — HIGH (ref 10.3–14.5)
SODIUM SERPL-SCNC: 132 MMOL/L — LOW (ref 135–145)
SODIUM SERPL-SCNC: 138 MMOL/L — SIGNIFICANT CHANGE UP (ref 135–145)
SODIUM SERPL-SCNC: 140 MMOL/L — SIGNIFICANT CHANGE UP (ref 135–145)
SODIUM UR-SCNC: 113 MMOL/L — SIGNIFICANT CHANGE UP
T4 FREE SERPL-MCNC: 1 NG/DL — SIGNIFICANT CHANGE UP (ref 0.9–1.8)
TACROLIMUS SERPL-MCNC: 4.8 NG/ML — SIGNIFICANT CHANGE UP
TIBC SERPL-MCNC: 166 UG/DL — LOW (ref 220–430)
TRIGL SERPL-MCNC: 90 MG/DL — SIGNIFICANT CHANGE UP
TSH SERPL-MCNC: 1.13 UIU/ML — SIGNIFICANT CHANGE UP (ref 0.27–4.2)
UIBC SERPL-MCNC: 35 UG/DL — LOW (ref 110–370)
UUN UR-MCNC: 330.5 MG/DL — SIGNIFICANT CHANGE UP
VIT B12 SERPL-MCNC: 160 PG/ML — LOW (ref 200–900)
WBC # BLD: 11.82 K/UL — HIGH (ref 3.8–10.5)
WBC # BLD: 9.12 K/UL — SIGNIFICANT CHANGE UP (ref 3.8–10.5)
WBC # FLD AUTO: 11.82 K/UL — HIGH (ref 3.8–10.5)
WBC # FLD AUTO: 9.12 K/UL — SIGNIFICANT CHANGE UP (ref 3.8–10.5)

## 2020-12-16 PROCEDURE — 99223 1ST HOSP IP/OBS HIGH 75: CPT

## 2020-12-16 PROCEDURE — 99223 1ST HOSP IP/OBS HIGH 75: CPT | Mod: GC,25

## 2020-12-16 PROCEDURE — 99223 1ST HOSP IP/OBS HIGH 75: CPT | Mod: GC

## 2020-12-16 PROCEDURE — 12345: CPT | Mod: NC

## 2020-12-16 PROCEDURE — 43235 EGD DIAGNOSTIC BRUSH WASH: CPT | Mod: GC

## 2020-12-16 RX ORDER — MAGNESIUM SULFATE 500 MG/ML
2 VIAL (ML) INJECTION ONCE
Refills: 0 | Status: COMPLETED | OUTPATIENT
Start: 2020-12-16 | End: 2020-12-16

## 2020-12-16 RX ORDER — INSULIN LISPRO 100/ML
VIAL (ML) SUBCUTANEOUS
Refills: 0 | Status: DISCONTINUED | OUTPATIENT
Start: 2020-12-16 | End: 2020-12-18

## 2020-12-16 RX ORDER — INSULIN LISPRO 100/ML
VIAL (ML) SUBCUTANEOUS AT BEDTIME
Refills: 0 | Status: DISCONTINUED | OUTPATIENT
Start: 2020-12-16 | End: 2020-12-18

## 2020-12-16 RX ORDER — PREGABALIN 225 MG/1
1000 CAPSULE ORAL ONCE
Refills: 0 | Status: COMPLETED | OUTPATIENT
Start: 2020-12-16 | End: 2020-12-16

## 2020-12-16 RX ORDER — PANTOPRAZOLE SODIUM 20 MG/1
40 TABLET, DELAYED RELEASE ORAL
Refills: 0 | Status: DISCONTINUED | OUTPATIENT
Start: 2020-12-16 | End: 2020-12-18

## 2020-12-16 RX ORDER — SODIUM CHLORIDE 9 MG/ML
1000 INJECTION INTRAMUSCULAR; INTRAVENOUS; SUBCUTANEOUS
Refills: 0 | Status: DISCONTINUED | OUTPATIENT
Start: 2020-12-16 | End: 2020-12-16

## 2020-12-16 RX ORDER — INSULIN GLARGINE 100 [IU]/ML
8 INJECTION, SOLUTION SUBCUTANEOUS ONCE
Refills: 0 | Status: COMPLETED | OUTPATIENT
Start: 2020-12-16 | End: 2020-12-16

## 2020-12-16 RX ADMIN — Medication 2.5 MILLIGRAM(S): at 18:08

## 2020-12-16 RX ADMIN — Medication 1: at 18:07

## 2020-12-16 RX ADMIN — SODIUM CHLORIDE 50 MILLILITER(S): 9 INJECTION INTRAMUSCULAR; INTRAVENOUS; SUBCUTANEOUS at 06:00

## 2020-12-16 RX ADMIN — PREGABALIN 1000 MICROGRAM(S): 225 CAPSULE ORAL at 12:46

## 2020-12-16 RX ADMIN — TACROLIMUS 1 MILLIGRAM(S): 5 CAPSULE ORAL at 18:07

## 2020-12-16 RX ADMIN — MYCOPHENOLATE MOFETIL 500 MILLIGRAM(S): 250 CAPSULE ORAL at 06:00

## 2020-12-16 RX ADMIN — Medication 50 GRAM(S): at 02:41

## 2020-12-16 RX ADMIN — PANTOPRAZOLE SODIUM 40 MILLIGRAM(S): 20 TABLET, DELAYED RELEASE ORAL at 18:07

## 2020-12-16 RX ADMIN — Medication 1: at 06:42

## 2020-12-16 RX ADMIN — MYCOPHENOLATE MOFETIL 500 MILLIGRAM(S): 250 CAPSULE ORAL at 18:07

## 2020-12-16 RX ADMIN — Medication 3: at 00:05

## 2020-12-16 RX ADMIN — Medication 85 MILLIMOLE(S): at 06:10

## 2020-12-16 RX ADMIN — Medication 2.5 MILLIGRAM(S): at 06:01

## 2020-12-16 RX ADMIN — MAGNESIUM HYDROXIDE 30 MILLILITER(S): 400 TABLET, CHEWABLE ORAL at 12:46

## 2020-12-16 RX ADMIN — TACROLIMUS 1 MILLIGRAM(S): 5 CAPSULE ORAL at 06:00

## 2020-12-16 RX ADMIN — ATORVASTATIN CALCIUM 20 MILLIGRAM(S): 80 TABLET, FILM COATED ORAL at 22:56

## 2020-12-16 RX ADMIN — Medication 1 MILLIGRAM(S): at 12:46

## 2020-12-16 RX ADMIN — Medication 2000 UNIT(S): at 12:46

## 2020-12-16 RX ADMIN — PANTOPRAZOLE SODIUM 40 MILLIGRAM(S): 20 TABLET, DELAYED RELEASE ORAL at 06:12

## 2020-12-16 NOTE — PROGRESS NOTE ADULT - PROBLEM SELECTOR PLAN 6
H/o T2DM, reportedly taking lantus 16 units and novolog 4units premeals  Diet to be resumed tonight, will increase lantus 8-->12 while inpatient.  Still holding premeal Reportedly had MI in the past w/ 1x NATALIE, s/p CABG, on ASA at home  - hold ASA in setting of GI bleed  - c/w atorvastatin for now

## 2020-12-16 NOTE — PROGRESS NOTE ADULT - PROBLEM SELECTOR PLAN 1
s/p EGD  nonbleeding ulcers seen  protonix BID  Will monitor H and H, CBC at 4 pm  No urgent indication for blood transfusion at present  Set up outpatient GI

## 2020-12-16 NOTE — PROGRESS NOTE ADULT - PROBLEM SELECTOR PLAN 7
Reportedly taking isosorbide 60mg and carvedilol 25mg BID outpatient  BP controlled off meds at present  Will monitor and restart as needed H/o T2DM, reportedly taking lantus 16 units and novolog 4units premeals  Diet to be resumed tonight, will increase lantus 8-->12 while inpatient.  Still holding premeal

## 2020-12-16 NOTE — CONSULT NOTE ADULT - SUBJECTIVE AND OBJECTIVE BOX
Chief Complaint:  Patient is a 65y old  Male who presents with a chief complaint of hematemesis (15 Dec 2020 22:29)      HPI: 64 yo M w/ h/o CAD w/ 1x NATALIE, CABG in , R kidney transplant in  on prednisone, prograf and cellcept, T2DM, HTN presenting for 1 episode of hematemesis. Pt reports waking this morning w/ 1 episode of "small amount" of bloody vomit. Pt reports that this has never happened to him before. Pt reports that he is only on ASA since the CABG, and otherwise he is not on any other blood thinners. Pt otherwise is complaining of mild lower back pain. Otherwise, denies fevers, chills, chest p/p, sob, diarrhea, sick contacts, recent travel.       At the ED, pt vitals: /67, , saturating well on RA, afebrile. CBC sig for mild leukocytosis to 12, Hg 11 (unknown baseline), CMP sig for K of 6.2 - now improved s/p treatment, Crt 1.64 (unknown baseline), proBNP elevated at 2888. US kidney showing no rejection, CXR clear. Pt seen by renal, given insulin 5units, lasix 40mg IV, lokelma 10.        Allergies:  No Known Allergies      Home Medications:    Hospital Medications:  atorvastatin 20 milliGRAM(s) Oral at bedtime  cholecalciferol 2000 Unit(s) Oral daily  dextrose 40% Gel 15 Gram(s) Oral once  dextrose 5%. 1000 milliLiter(s) IV Continuous <Continuous>  dextrose 5%. 1000 milliLiter(s) IV Continuous <Continuous>  dextrose 50% Injectable 25 Gram(s) IV Push once  dextrose 50% Injectable 12.5 Gram(s) IV Push once  dextrose 50% Injectable 25 Gram(s) IV Push once  folic acid 1 milliGRAM(s) Oral daily  glucagon  Injectable 1 milliGRAM(s) IntraMuscular once  insulin glargine Injectable (LANTUS) 8 Unit(s) SubCutaneous once  insulin lispro (ADMELOG) corrective regimen sliding scale   SubCutaneous every 6 hours  magnesium hydroxide Suspension 30 milliLiter(s) Oral daily  mycophenolate mofetil 500 milliGRAM(s) Oral two times a day  pantoprazole  Injectable 40 milliGRAM(s) IV Push every 12 hours  predniSONE   Tablet 2.5 milliGRAM(s) Oral two times a day  sodium chloride 0.9%. 1000 milliLiter(s) IV Continuous <Continuous>  sodium phosphate IVPB 30 milliMole(s) IV Intermittent once  tacrolimus 1 milliGRAM(s) Oral every 12 hours      PMHX/PSHX:  High cholesterol    HTN (hypertension)    DM (diabetes mellitus)    CAD (coronary artery disease)    S/P kidney transplant    S/P triple vessel bypass        Family history:  No pertinent family history in first degree relatives        Social History:     ROS:     General:  No weight loss, fevers, chills, night sweats, fatigue  Eyes:  No vision changes, no yellowing of eyes   ENT:  No throat pain, runny nose  CV:  No chest pain, palpitations  Resp:  No SOB, cough, wheezing  GI:  See HPI  :  No burning with urination, no hematuria   Muscle:  No muscle pain, weakness  Neuro:  No numbness/tingling, memory problems  Psych:  No fatigue, insomnia, mood problems  Heme:  No easy bruisability  Skin:  No rash, itching       PHYSICAL EXAM:     GENERAL:  Appears stated age, well-groomed, well-nourished, no distress  HEENT:  NC/AT,  conjunctivae clear and pink,  no JVD  CHEST:  Full & symmetric excursion, no increased effort, breath sounds clear  HEART:  Regular rhythm, S1, S2, no murmur/rub/S3/S4, no abdominal bruit, no edema  ABDOMEN:  Soft, non-tender, non-distended, normoactive bowel sounds,  no masses ,  EXTREMITIES:  no cyanosis,clubbing or edema  SKIN:  No rash/erythema/ecchymoses/petechiae/wounds/abscess/warm/dry  NEURO:  Alert, oriented    Vital Signs:  Vital Signs Last 24 Hrs  T(C): 36.7 (16 Dec 2020 05:10), Max: 37.2 (15 Dec 2020 20:48)  T(F): 98 (16 Dec 2020 05:10), Max: 99 (15 Dec 2020 20:48)  HR: 88 (16 Dec 2020 05:10) (88 - 105)  BP: 152/65 (16 Dec 2020 05:10) (111/57 - 204/133)  BP(mean): --  RR: 16 (16 Dec 2020 05:10) (16 - 18)  SpO2: 100% (16 Dec 2020 05:10) (98% - 100%)  Daily Height in cm: 167.64 (16 Dec 2020 05:10)    Daily     LABS:                        11.3   12.64 )-----------( 133      ( 15 Dec 2020 17:39 )             36.4     12-15    132<L>  |  101  |  80<H>  ----------------------------<  286<H>  5.0   |  18<L>  |  1.67<H>    Ca    9.2      15 Dec 2020 23:56  Phos  1.3     12-15  Mg     1.5     12-15    TPro  6.1  /  Alb  4.0  /  TBili  0.9  /  DBili  x   /  AST  13  /  ALT  15  /  AlkPhos  73  12-15    LIVER FUNCTIONS - ( 15 Dec 2020 17:39 )  Alb: 4.0 g/dL / Pro: 6.1 g/dL / ALK PHOS: 73 U/L / ALT: 15 U/L / AST: 13 U/L / GGT: x           PT/INR - ( 15 Dec 2020 17:39 )   PT: 15.6 sec;   INR: 1.38 ratio         PTT - ( 15 Dec 2020 17:39 )  PTT:31.5 sec  Urinalysis Basic - ( 15 Dec 2020 18:54 )    Color: Yellow / Appearance: Clear / S.025 / pH: x  Gluc: x / Ketone: Small  / Bili: Negative / Urobili: <2 mg/dL   Blood: x / Protein: Trace / Nitrite: Negative   Leuk Esterase: Negative / RBC: x / WBC x   Sq Epi: x / Non Sq Epi: x / Bacteria: x          Imaging:             Chief Complaint:  Patient is a 65y old  Male who presents with a chief complaint of hematemesis (15 Dec 2020 22:29)      HPI: 66 yo M w/ h/o CAD w/ 1x NATALIE, CABG in , R kidney transplant in 2015 on prednisone, prograf and cellcept, T2DM, HTN presenting for 1 episode of hematemesis. Pt reports waking this morning w/ 1 episode of bloody vomit. Pt reports that this has never happened to him before. He states he noted a large amount of vomit in the bed that he noted was brown and then his wife saw that it was red and called 911. Pt reports that he is only on ASA since the CABG, and otherwise he is not on any other blood thinners. Otherwise, denies fevers, chills, chest pain, sob, diarrhea, sick contacts, recent travel. He reports his stool over the past 2 days has been darker in color but still reports brown in color, not black.      At the ED, pt vitals: /67, , saturating well on RA, afebrile. CBC sig for mild leukocytosis to 12, Hg 11 (unknown baseline), CMP sig for K of 6.2 - now improved s/p treatment, Crt 1.64 (unknown baseline), proBNP elevated at 2888. US kidney showing no rejection, CXR clear. Pt seen by renal, given insulin 5units, lasix 40mg IV, lokelma 10.        Allergies:  No Known Allergies      Home Medications:    Hospital Medications:  atorvastatin 20 milliGRAM(s) Oral at bedtime  cholecalciferol 2000 Unit(s) Oral daily  dextrose 40% Gel 15 Gram(s) Oral once  dextrose 5%. 1000 milliLiter(s) IV Continuous <Continuous>  dextrose 5%. 1000 milliLiter(s) IV Continuous <Continuous>  dextrose 50% Injectable 25 Gram(s) IV Push once  dextrose 50% Injectable 12.5 Gram(s) IV Push once  dextrose 50% Injectable 25 Gram(s) IV Push once  folic acid 1 milliGRAM(s) Oral daily  glucagon  Injectable 1 milliGRAM(s) IntraMuscular once  insulin glargine Injectable (LANTUS) 8 Unit(s) SubCutaneous once  insulin lispro (ADMELOG) corrective regimen sliding scale   SubCutaneous every 6 hours  magnesium hydroxide Suspension 30 milliLiter(s) Oral daily  mycophenolate mofetil 500 milliGRAM(s) Oral two times a day  pantoprazole  Injectable 40 milliGRAM(s) IV Push every 12 hours  predniSONE   Tablet 2.5 milliGRAM(s) Oral two times a day  sodium chloride 0.9%. 1000 milliLiter(s) IV Continuous <Continuous>  sodium phosphate IVPB 30 milliMole(s) IV Intermittent once  tacrolimus 1 milliGRAM(s) Oral every 12 hours      PMHX/PSHX:  High cholesterol    HTN (hypertension)    DM (diabetes mellitus)    CAD (coronary artery disease)    S/P kidney transplant    S/P triple vessel bypass        Family history:  No pertinent family history in first degree relatives        Social History:     ROS:     General:  No weight loss, fevers, chills, night sweats, fatigue  Eyes:  No vision changes, no yellowing of eyes   ENT:  No throat pain, runny nose  CV:  No chest pain, palpitations  Resp:  No SOB, cough, wheezing  GI:  See HPI  :  No burning with urination, no hematuria   Muscle:  No muscle pain, weakness  Neuro:  No numbness/tingling, memory problems  Psych:  No fatigue, insomnia, mood problems  Heme:  No easy bruisability  Skin:  No rash, itching       PHYSICAL EXAM:     GENERAL:  Appears stated age, well-groomed, well-nourished, no distress  HEENT:  NC/AT,  conjunctivae clear and pink,  no JVD  CHEST:  Full & symmetric excursion, no increased effort, breath sounds clear  HEART:  Regular rhythm, S1, S2, no murmur/rub/S3/S4, no abdominal bruit, no edema  ABDOMEN:  Soft, non-tender, non-distended, normoactive bowel sounds,  no masses ,  EXTREMITIES:  no cyanosis, clubbing or edema  SKIN:  No rash/erythema/ecchymoses/petechiae/wounds/abscess/warm/dry  NEURO:  Alert, oriented      Vital Signs:  Vital Signs Last 24 Hrs  T(C): 36.7 (16 Dec 2020 05:10), Max: 37.2 (15 Dec 2020 20:48)  T(F): 98 (16 Dec 2020 05:10), Max: 99 (15 Dec 2020 20:48)  HR: 88 (16 Dec 2020 05:10) (88 - 105)  BP: 152/65 (16 Dec 2020 05:10) (111/57 - 204/133)  BP(mean): --  RR: 16 (16 Dec 2020 05:10) (16 - 18)  SpO2: 100% (16 Dec 2020 05:10) (98% - 100%)  Daily Height in cm: 167.64 (16 Dec 2020 05:10)    Daily     LABS:                        11.3   12.64 )-----------( 133      ( 15 Dec 2020 17:39 )             36.4     12-15    132<L>  |  101  |  80<H>  ----------------------------<  286<H>  5.0   |  18<L>  |  1.67<H>    Ca    9.2      15 Dec 2020 23:56  Phos  1.3     12-15  Mg     1.5     12-15    TPro  6.1  /  Alb  4.0  /  TBili  0.9  /  DBili  x   /  AST  13  /  ALT  15  /  AlkPhos  73  12-15    LIVER FUNCTIONS - ( 15 Dec 2020 17:39 )  Alb: 4.0 g/dL / Pro: 6.1 g/dL / ALK PHOS: 73 U/L / ALT: 15 U/L / AST: 13 U/L / GGT: x           PT/INR - ( 15 Dec 2020 17:39 )   PT: 15.6 sec;   INR: 1.38 ratio         PTT - ( 15 Dec 2020 17:39 )  PTT:31.5 sec  Urinalysis Basic - ( 15 Dec 2020 18:54 )    Color: Yellow / Appearance: Clear / S.025 / pH: x  Gluc: x / Ketone: Small  / Bili: Negative / Urobili: <2 mg/dL   Blood: x / Protein: Trace / Nitrite: Negative   Leuk Esterase: Negative / RBC: x / WBC x   Sq Epi: x / Non Sq Epi: x / Bacteria: x          Imaging:           Chief Complaint:  Patient is a 65y old  Male who presents with a chief complaint of hematemesis (15 Dec 2020 22:29)      HPI: 64 yo M w/ h/o CAD w/ 1x NATALIE, CABG in , R kidney transplant in 2015 on prednisone, prograf and cellcept, T2DM, HTN presenting for 1 episode of hematemesis. Pt reports waking this morning w/ 1 episode of bloody vomit. Pt reports that this has never happened to him before. He states he noted a large amount of vomit in the bed that he noted was brown and then his wife saw that it was red and called 911. Pt reports that he is only on ASA since the CABG, and otherwise he is not on any other blood thinners. Otherwise, denies fevers, chills, chest pain, sob, diarrhea, sick contacts, recent travel. He reports his stool over the past 2 days has been darker in color but still reports brown in color, not black.      At the ED, pt vitals: /67, , saturating well on RA, afebrile. CBC sig for mild leukocytosis to 12, Hg 11 (unknown baseline), CMP sig for K of 6.2 - now improved s/p treatment, Crt 1.64 (unknown baseline), proBNP elevated at 2888. US kidney showing no rejection, CXR clear. Pt seen by renal, given insulin 5units, lasix 40mg IV, lokelma 10.        Allergies:  No Known Allergies      Home Medications:    Hospital Medications:  atorvastatin 20 milliGRAM(s) Oral at bedtime  cholecalciferol 2000 Unit(s) Oral daily  dextrose 40% Gel 15 Gram(s) Oral once  dextrose 5%. 1000 milliLiter(s) IV Continuous <Continuous>  dextrose 5%. 1000 milliLiter(s) IV Continuous <Continuous>  dextrose 50% Injectable 25 Gram(s) IV Push once  dextrose 50% Injectable 12.5 Gram(s) IV Push once  dextrose 50% Injectable 25 Gram(s) IV Push once  folic acid 1 milliGRAM(s) Oral daily  glucagon  Injectable 1 milliGRAM(s) IntraMuscular once  insulin glargine Injectable (LANTUS) 8 Unit(s) SubCutaneous once  insulin lispro (ADMELOG) corrective regimen sliding scale   SubCutaneous every 6 hours  magnesium hydroxide Suspension 30 milliLiter(s) Oral daily  mycophenolate mofetil 500 milliGRAM(s) Oral two times a day  pantoprazole  Injectable 40 milliGRAM(s) IV Push every 12 hours  predniSONE   Tablet 2.5 milliGRAM(s) Oral two times a day  sodium chloride 0.9%. 1000 milliLiter(s) IV Continuous <Continuous>  sodium phosphate IVPB 30 milliMole(s) IV Intermittent once  tacrolimus 1 milliGRAM(s) Oral every 12 hours      PMHX/PSHX:    High cholesterol  HTN (hypertension)  DM (diabetes mellitus)  CAD (coronary artery disease)  S/P kidney transplant  S/P triple vessel bypass      Family history:  No pertinent family history in first degree relatives      Social History:  Pt denies smoking, drinking, recreational drug use, lives at home w/ wife, son and daughter-in-law, currently retired. (15 Dec 2020 22:29)      ROS:   Pertinent ROS as per HPI  General:  No weight loss, fevers, chills, night sweats, fatigue  Eyes:  No vision changes, no yellowing of eyes   ENT:  No throat pain, runny nose  CV:  No chest pain, palpitations  Resp:  No SOB, cough, wheezing  GI:  See HPI  :  No burning with urination, no hematuria   Muscle:  No muscle pain, weakness  Neuro:  No numbness/tingling, memory problems  Psych:  No fatigue, insomnia, mood problems  Heme:  No easy bruisability  Skin:  No rash, itching       PHYSICAL EXAM:     GENERAL:  Appears stated age, well-groomed, well-nourished, no distress  HEENT:  NC/AT,  conjunctivae clear and pink,  no JVD  NECK: supple  CHEST:  Full & symmetric excursion, no increased effort, breath sounds clear  HEART:  Regular rhythm, S1, S2, no murmur/rub/S3/S4, no abdominal bruit, no edema  ABDOMEN:  Soft, non-tender, non-distended, normoactive bowel sounds,  no masses  EXTREMITIES:  no cyanosis, clubbing or edema  SKIN:  No rash/erythema/ecchymoses/petechiae/wounds/abscess/warm/dry  NEURO:  Alert, oriented  PSYCH: Normal affect      Vital Signs:  Vital Signs Last 24 Hrs  T(C): 36.7 (16 Dec 2020 05:10), Max: 37.2 (15 Dec 2020 20:48)  T(F): 98 (16 Dec 2020 05:10), Max: 99 (15 Dec 2020 20:48)  HR: 88 (16 Dec 2020 05:10) (88 - 105)  BP: 152/65 (16 Dec 2020 05:10) (111/57 - 204/133)  BP(mean): --  RR: 16 (16 Dec 2020 05:10) (16 - 18)  SpO2: 100% (16 Dec 2020 05:10) (98% - 100%)  Daily Height in cm: 167.64 (16 Dec 2020 05:10)    Daily     LABS:                        11.3   12.64 )-----------( 133      ( 15 Dec 2020 17:39 )             36.4     12-15    132<L>  |  101  |  80<H>  ----------------------------<  286<H>  5.0   |  18<L>  |  1.67<H>    Ca    9.2      15 Dec 2020 23:56  Phos  1.3     12-15  Mg     1.5     12-15    TPro  6.1  /  Alb  4.0  /  TBili  0.9  /  DBili  x   /  AST  13  /  ALT  15  /  AlkPhos  73  12-15    LIVER FUNCTIONS - ( 15 Dec 2020 17:39 )  Alb: 4.0 g/dL / Pro: 6.1 g/dL / ALK PHOS: 73 U/L / ALT: 15 U/L / AST: 13 U/L / GGT: x           PT/INR - ( 15 Dec 2020 17:39 )   PT: 15.6 sec;   INR: 1.38 ratio         PTT - ( 15 Dec 2020 17:39 )  PTT:31.5 sec  Urinalysis Basic - ( 15 Dec 2020 18:54 )    Color: Yellow / Appearance: Clear / S.025 / pH: x  Gluc: x / Ketone: Small  / Bili: Negative / Urobili: <2 mg/dL   Blood: x / Protein: Trace / Nitrite: Negative   Leuk Esterase: Negative / RBC: x / WBC x   Sq Epi: x / Non Sq Epi: x / Bacteria: x          Imaging:

## 2020-12-16 NOTE — CONSULT NOTE ADULT - PROBLEM SELECTOR RECOMMENDATION 9
Pt. with renal failure in the setting of GI bleed. Baseline serum creatinine unknown. No labs available for review on [facility]. Scr on arrival to the ER was 1.64 which has improved to 1.45 today. UA shows trace proteinuria, spot urine TP/Cr ratio WNL (0.2). Urine electrolytes consistent with pre-renal IZZY. Pt. with ? hemodynamically mediated non-oliguric IZZY. Recommend 1L IV @ 75 ml/hour while pt. NPO. Check US Transplant Kidney with doppler study. Monitor labs and urine output. Avoid potential nephrotoxins. Dose medications as per eGFR.

## 2020-12-16 NOTE — PHYSICAL THERAPY INITIAL EVALUATION ADULT - PERTINENT HX OF CURRENT PROBLEM, REHAB EVAL
Pt is a 64 yo M w/ h/o CAD w/ 1x NATALIE, CABG in 2005, R kidney transplant in 2015 on prednisone, prograf and cellcept, T2DM, HTN presenting for 1x episode of hematemesis. Pt reports waking this morning w/ 1 episode of "small amount" of bloody vomit. Pt reports that this has never happened to him before. Pt reports that he is only on ASA since the CABG, and otherwise he is not on any other blood thinners.

## 2020-12-16 NOTE — CONSULT NOTE ADULT - ASSESSMENT
66 yo M w/ h/o CAD w/ 1x NATALIE, CABG in 2005, R kidney transplant in 2015 on prednisone, prograf and cellcept, T2DM, HTN presenting for 1 episode of hematemesis.       Impression:  #Hematemesis - one episode at home, hgb 11.3 with unknown baseline, patient is on chronic prednisone and at risk for ulcers, other ddx includes MW tear, esophagitis, gastritis, angioectasia, malignancy; no known liver disease although platelets slightly low and INR slightly elevated  #CAD s/p CABG  #History of renal transplant, on immunosuppression 64 yo M w/ h/o CAD w/ 1x NATALIE, CABG in 2005, R kidney transplant in 2015 on prednisone, prograf and cellcept, T2DM, HTN presenting for 1 episode of hematemesis.       Impression:  #Hematemesis - one episode at home, hgb 11.3 with unknown baseline, patient is on chronic prednisone and at risk for ulcers, other ddx includes MW tear, esophagitis, gastritis, angioectasia, malignancy; no known liver disease although platelets slightly low and INR slightly elevated  #CAD s/p CABG  #History of renal transplant, on immunosuppression      Recommendation:  - keep NPO  - IV PPI BID  - maintain active type and screen, transfuse for Hgb<7  - EGD today      Zhanna Orona PGY-4  Gastroenterology Fellow  Pager #[phone] or [phone]  Please page on-call Fellow on weekends/after 5 pm on weekdays   Please call answering service for on-call GI fellow ([phone]) after 5pm and before 8am, and on weekends.          64 yo M w/ h/o CAD w/ 1x NATALIE, CABG in 2005, R kidney transplant in 2015 on prednisone, prograf and cellcept, T2DM, HTN presenting for 1 episode of hematemesis.       Impression:  #Hematemesis - one episode at home, hgb 11.3 with unknown baseline, patient is on chronic prednisone and at risk for ulcers, other ddx includes MW tear, esophagitis, gastritis, angioectasia, malignancy; no known liver disease although platelets slightly low and INR slightly elevated  #CAD s/p CABG  #History of renal transplant, on immunosuppression      Recommendation:  - Monitor CBC, transfuse to Hb>7  - keep NPO  - IV PPI BID  - maintain active type and screen  - EGD today      Zhanna Orona PGY-4  Gastroenterology Fellow  Pager #[phone] or [phone]  Please page on-call Fellow on weekends/after 5 pm on weekdays   Please call answering service for on-call GI fellow ([phone]) after 5pm and before 8am, and on weekends.

## 2020-12-16 NOTE — PHYSICAL THERAPY INITIAL EVALUATION ADULT - ADDITIONAL COMMENTS
no
Pt reports living with wife, daughter and son in law in house. Pt reports there are ~4 steps to enter house and ~13 steps to get to 2nd floor with no handrails to use. Pt reports having a step-over shower. Pt denies using any assistive device previously. Pt denies having any falls.    Pt left comfortable in bed with all lines intact, all precautions maintained, call segovia in reach, and JOCELYN Mason made aware.

## 2020-12-16 NOTE — PROGRESS NOTE ADULT - PROBLEM SELECTOR PLAN 2
Appreciate renal following  We are continuing all home anti-rejection meds. Check Prograf level tomorrow AM (30 minutes prior to AM dose).

## 2020-12-16 NOTE — PROGRESS NOTE ADULT - PROBLEM SELECTOR PLAN 9
Problem: Discharge planning issues. Plan; Transitions of Care Status:  1. Name of PCP: Barbara Fontaine?   2. PCP contacted on Admission: []Y []N  3. PCP contacted at Discharge: []Y []N  4. Post-Discharge Appointment Date and Location:   5. Summary of Handoff given to PCP: GOC: full code  DVT prophylaxis: SCDS for now  Activity: ambulate as tolerated  Diet: NPO except meds  Fall, Asp precaution

## 2020-12-16 NOTE — PROGRESS NOTE ADULT - SUBJECTIVE AND OBJECTIVE BOX
Michael Deutsch M.D.  Internal Medicine PGY-3  Pager: [phone]/ J [phone]             Patient is a 65y old  Male who presents with a chief complaint of hematemesis (16 Dec 2020 11:28)    SUBJECTIVE / OVERNIGHT EVENTS: Patient seen and examined. No abd pain NVDC or further episodes of hematemesis.   On tele:    OBJECTIVE:  Vital Signs Last 24 Hrs  T(C): 36.3 (16 Dec 2020 10:05), Max: 37.2 (15 Dec 2020 20:48)  T(F): 97.4 (16 Dec 2020 10:05), Max: 99 (15 Dec 2020 20:48)  HR: 75 (16 Dec 2020 10:48) (75 - 105)  BP: 114/61 (16 Dec 2020 10:48) (108/53 - 204/133)  BP(mean): --  RR: 20 (16 Dec 2020 10:48) (16 - 20)  SpO2: 99% (16 Dec 2020 10:48) (97% - 100%)    I&O's Summary    Physical Examination:  GEN: Well appearing, in no apparent distress  EYES: PERRL, EOMI, no scleral icterus  HEAD/NECK: Normocephalic, atraumatic, no cervical lymphadenopathy  RESP: no accessory muscle use, B/L air entry, CTAB   CARDIO: regular rate/rhythm, no LE edema B/L  ABD: soft, NT, ND, +BS  PSYCH: A&Ox3, normal affect  SKIN: warm, dry, in tact, no rashes  NEURO: no focal neurologic deficits appreciated  EXT: no lower extremity edema, no cyanosis  VASC: good peripheral pulses    Labs:  CAPILLARY BLOOD GLUCOSE      POCT Blood Glucose.: 147 mg/dL (16 Dec 2020 12:01)  POCT Blood Glucose.: 143 mg/dL (16 Dec 2020 10:37)  POCT Blood Glucose.: 173 mg/dL (16 Dec 2020 09:27)  POCT Blood Glucose.: 177 mg/dL (16 Dec 2020 07:50)  POCT Blood Glucose.: 166 mg/dL (16 Dec 2020 05:59)  POCT Blood Glucose.: 278 mg/dL (15 Dec 2020 23:55)  POCT Blood Glucose.: 248 mg/dL (15 Dec 2020 19:21)    CBC Full  -  ( 16 Dec 2020 07:47 )  WBC Count : 9.12 K/uL  RBC Count : 3.03 M/uL  Hemoglobin : 8.6 g/dL  Hematocrit : 27.6 %  Platelet Count - Automated : 91 K/uL  Mean Cell Volume : 91.1 fL  Mean Cell Hemoglobin : 28.4 pg  Mean Cell Hemoglobin Concentration : 31.2 gm/dL  Auto Neutrophil # : 6.14 K/uL  Auto Lymphocyte # : 1.50 K/uL  Auto Monocyte # : 1.16 K/uL  Auto Eosinophil # : 0.00 K/uL  Auto Basophil # : 0.00 K/uL  Auto Neutrophil % : 62.7 %  Auto Lymphocyte % : 16.4 %  Auto Monocyte % : 12.7 %  Auto Eosinophil % : 0.0 %  Auto Basophil % : 0.0 %        138  |  109<H>  |  73<H>  ----------------------------<  156<H>  3.3<L>   |  19<L>  |  1.45<H>    Ca    7.6<L>      16 Dec 2020 07:47  Phos  1.9     12-  Mg     1.9         TPro  4.9<L>  /  Alb  3.0<L>  /  TBili  0.5  /  DBili  x   /  AST  14  /  ALT  11  /  AlkPhos  53  12-    PT/INR - ( 15 Dec 2020 17:39 )   PT: 15.6 sec;   INR: 1.38 ratio         PTT - ( 15 Dec 2020 17:39 )  PTT:31.5 sec  CARDIAC MARKERS ( 15 Dec 2020 19:55 )  x     / x     / 67 U/L / x     / 5.6 ng/mL        Urinalysis Basic - ( 15 Dec 2020 18:54 )    Color: Yellow / Appearance: Clear / S.025 / pH: x  Gluc: x / Ketone: Small  / Bili: Negative / Urobili: <2 mg/dL   Blood: x / Protein: Trace / Nitrite: Negative   Leuk Esterase: Negative / RBC: x / WBC x   Sq Epi: x / Non Sq Epi: x / Bacteria: x    Imaging Personally Reviewed:    I&O's Summary    Consultant(s) Notes Reviewed:   Care Discussed with Consultants/Other Providers:    MEDICATIONS  (STANDING):  atorvastatin 20 milliGRAM(s) Oral at bedtime  cholecalciferol 2000 Unit(s) Oral daily  cyanocobalamin Injectable 1000 MICROGram(s) IntraMuscular once  dextrose 40% Gel 15 Gram(s) Oral once  dextrose 5%. 1000 milliLiter(s) (50 mL/Hr) IV Continuous <Continuous>  dextrose 5%. 1000 milliLiter(s) (100 mL/Hr) IV Continuous <Continuous>  dextrose 50% Injectable 25 Gram(s) IV Push once  dextrose 50% Injectable 12.5 Gram(s) IV Push once  dextrose 50% Injectable 25 Gram(s) IV Push once  folic acid 1 milliGRAM(s) Oral daily  glucagon  Injectable 1 milliGRAM(s) IntraMuscular once  insulin lispro (ADMELOG) corrective regimen sliding scale   SubCutaneous every 6 hours  magnesium hydroxide Suspension 30 milliLiter(s) Oral daily  mycophenolate mofetil 500 milliGRAM(s) Oral two times a day  pantoprazole    Tablet 40 milliGRAM(s) Oral two times a day  predniSONE   Tablet 2.5 milliGRAM(s) Oral two times a day  tacrolimus 1 milliGRAM(s) Oral every 12 hours    MEDICATIONS  (PRN):

## 2020-12-16 NOTE — PROGRESS NOTE ADULT - ATTENDING COMMENTS
Patient seen and examined. Case discussed with the medical team on rounds. I agree with the findings and the plan above.    Wily Mcfadden is a 65 year old gentlemen with h/o CAD s/p CABG and kidney transplant 4-5 yrs ago on Prograf daily p/w hematemesis x1  and back pain. No further episodes of hematemesis in ED. On admission found to be hyperkalemic to 6.2, Lokelma and cocktail administered. No EKG changes. US kidney with no evidence of rejection.   GI and nephrology consulted    Hb drop noted 2/2 to possible upper GI bleed  patient s/p EGD w/evidence of esophagitis  Blood work reveals low B12 levels, patient states that he does eat meat  Continue the rest of the work up and management as stated above

## 2020-12-16 NOTE — PROGRESS NOTE ADULT - PROBLEM SELECTOR PLAN 10
Problem: Discharge planning issues. Plan; Transitions of Care Status:  1. Name of PCP: Barbara Fontaine?   2. PCP contacted on Admission: []Y []N  3. PCP contacted at Discharge: []Y []N  4. Post-Discharge Appointment Date and Location:   5. Summary of Handoff given to PCP:

## 2020-12-16 NOTE — PROGRESS NOTE ADULT - PROBLEM SELECTOR PLAN 3
c/w prograf 1mg BID, cellcept 500mg BID, prednisone 2.5 BID  Check tacro level daily  US Transplant Kidney with doppler study.   Monitor labs and urine output. Avoid potential nephrotoxins. Dose medications as per eGFR.

## 2020-12-16 NOTE — CONSULT NOTE ADULT - PROBLEM SELECTOR RECOMMENDATION 2
Pt. s/p DDRT (6 years ago) without complication. Unknown baseline Scr. Pt. home medication regimen is Prograf 1 mg BID, Prednisone2.5mg BID, and Cellcept 1 gm BID. Recommend to resume home medications. Check Prograf level tomorrow AM (30 minutes prior to AM dose). Monitor labs.    If any questions, please feel free to contact me  Trae Gupta   Nephrology Fellow  [phone]  (After 5 pm or on weekends please page the on-call fellow) Pt. s/p DDRT (6 years ago) without complication. Unknown baseline Scr although patient reports that it is normal around 1. Pt. home medication regimen is Prograf 1 mg BID, Prednisone2.5mg BID, and Cellcept 1 gm BID. Recommend to resume home medications. Check Prograf level tomorrow AM (30 minutes prior to AM dose). Monitor labs.    If any questions, please feel free to contact me  Trae Gupta   Nephrology Fellow  [phone]  (After 5 pm or on weekends please page the on-call fellow)

## 2020-12-16 NOTE — PROGRESS NOTE ADULT - PROBLEM SELECTOR PLAN 5
Reportedly had MI in the past w/ 1x NATALIE, s/p CABG, on ASA at home  - hold ASA in setting of GI bleed  - c/w atorvastatin for now Anemia in this patient is 2/2 blood loss however ferritin is elevated suggesting chronic disease, and B12 is low, suggesting deficiency. Normal MCV may reflect mixed picture of anemia. Homocysteine to be sent to differentiate folate vs B12 def, methylmalonic acid level sent to confirm B12. We will dose 1000 mcg injection today, and can follow up with PCP outpatient for 2 more injections (q week) and further management.

## 2020-12-16 NOTE — CONSULT NOTE ADULT - SUBJECTIVE AND OBJECTIVE BOX
[facility] DIVISION OF KIDNEY DISEASES AND HYPERTENSION -- [phone]  -- INITIAL CONSULT NOTE  --------------------------------------------------------------------------------  HPI: 65-yeawr-old male with DM, HTN, ESRD s/p DDRT (about 6 years ago) was admitted to [facility] 12/15/20 for hematemesis        PAST HISTORY  --------------------------------------------------------------------------------  PAST MEDICAL & SURGICAL HISTORY:  High cholesterol    HTN (hypertension)    DM (diabetes mellitus)  Type 2    CAD (coronary artery disease)    S/P kidney transplant    S/P triple vessel bypass      FAMILY HISTORY:  FH: HTN (hypertension)      PAST SOCIAL HISTORY: lives at home with daughter and wife, denies any drug use    ALLERGIES & MEDICATIONS  --------------------------------------------------------------------------------  Allergies    No Known Allergies    Intolerances    Standing Inpatient Medications  atorvastatin 20 milliGRAM(s) Oral at bedtime  cholecalciferol 2000 Unit(s) Oral daily  cyanocobalamin Injectable 1000 MICROGram(s) IntraMuscular once  folic acid 1 milliGRAM(s) Oral daily  glucagon  Injectable 1 milliGRAM(s) IntraMuscular once  insulin lispro (ADMELOG) corrective regimen sliding scale   SubCutaneous every 6 hours  magnesium hydroxide Suspension 30 milliLiter(s) Oral daily  mycophenolate mofetil 500 milliGRAM(s) Oral two times a day  pantoprazole    Tablet 40 milliGRAM(s) Oral two times a day  predniSONE   Tablet 2.5 milliGRAM(s) Oral two times a day  tacrolimus 1 milliGRAM(s) Oral every 12 hours    REVIEW OF SYSTEMS  --------------------------------------------------------------------------------  Gen: no lethargy  Respiratory: No dyspnea  CV: No chest pain  GI: see HPI  MSK: no LE edema  Neuro: No dizziness  Heme: No bleeding    All other systems were reviewed and are negative, except as noted.    VITALS/PHYSICAL EXAM  --------------------------------------------------------------------------------  T(C): 36.3 (12-16-20 @ 10:05), Max: 37.2 (12-15-20 @ 20:48)  HR: 75 (12-16-20 @ 10:48) (75 - 105)  BP: 114/61 (12-16-20 @ 10:48) (108/53 - 204/133)  RR: 20 (12-16-20 @ 10:48) (16 - 20)  SpO2: 99% (12-16-20 @ 10:48) (97% - 100%)  Wt(kg): --  Height (cm): 167.6 (12-16-20 @ 05:10)  Weight (kg): 64.5 (12-16-20 @ 05:10)  BMI (kg/m2): 23 (12-16-20 @ 05:10)  BSA (m2): 1.73 (12-16-20 @ 05:10)    Physical Exam:  	Gen: NAD  	HEENT: MMM  	Pulm: good air entry B/L  	CV: S1S2  	Abd: Soft, +BS   	Ext: No LE edema B/L  	Neuro: Awake  	Skin: Warm and dry  	Vascular access: LUE AVF, mild thrill present    LABS/STUDIES  --------------------------------------------------------------------------------              8.6    9.12  >-----------<  91       [12-16-20 @ 07:47]              27.6     138  |  109  |  73  ----------------------------<  156      [12-16-20 @ 07:47]  3.3   |  19  |  1.45        Ca     7.6     [12-16-20 @ 07:47]      Mg     1.9     [12-16-20 @ 07:47]      Phos  1.9     [12-16-20 @ 07:47]    Creatinine Trend:  SCr 1.45 [12-16 @ 07:47]  SCr 1.67 [12-15 @ 23:56]  SCr 1.67 [12-15 @ 21:29]  SCr 1.64 [12-15 @ 17:39]    Urinalysis - [12-15-20 @ 18:54]      Color Yellow / Appearance Clear / SG 1.025 / pH 5.5      Gluc 200 mg/dL / Ketone Small  / Bili Negative / Urobili <2 mg/dL       Blood Negative / Protein Trace / Leuk Est Negative / Nitrite Negative      RBC  / WBC  / Hyaline  / Gran  / Sq Epi  / Non Sq Epi  / Bacteria     Urine Creatinine 20      [12-15-20 @ 23:56]  Urine Protein <4      [12-15-20 @ 23:56]  Urine Sodium 113      [12-15-20 @ 23:56]  Urine Urea Nitrogen 330.5      [12-15-20 @ 23:56]  Urine Osmolality 381      [12-15-20 @ 23:56]       [facility] DIVISION OF KIDNEY DISEASES AND HYPERTENSION -- [phone]  -- INITIAL CONSULT NOTE  --------------------------------------------------------------------------------  HPI: 65-yeawr-old male with DM, HTN, ESRD s/p DDRT (about 6 years ago) was admitted to [facility] on 12/15/20 for hematemesis. Pt. says he was drinking coffee yesterday and then developed coffee ground emesis. Pt. also reports history of dark stools. Pt. awaiting EGD by GI team. Nephrology team consulted for elevated serum creatinine and IS medication management. No labs available for review on [facility]. Pt. says he received a DDRT ~ 6 years ago (after being on HD for about 9 years) without any complications after transplant. Pt. says he developed ESKD secondary to longstanding DM and HTN. Pt. unclear of his baseline Scr. On arrival to the ER, Scr was 1.64 and serum potassium was 6.2 (non hemolyzed). Pt. received medical management of hyperkalemia and IV Lasix. Scr today is 1.45 and serum potassium low at 3.3.    Pt. evaluated at bedside, in no acute distress. Offers no complaints, awaiting EGD.    PAST HISTORY  --------------------------------------------------------------------------------  PAST MEDICAL & SURGICAL HISTORY:  High cholesterol    HTN (hypertension)    DM (diabetes mellitus)  Type 2    CAD (coronary artery disease)    S/P kidney transplant    S/P triple vessel bypass      FAMILY HISTORY:  FH: HTN (hypertension)    PAST SOCIAL HISTORY: lives at home with daughter and wife, denies any drug use    ALLERGIES & MEDICATIONS  --------------------------------------------------------------------------------  Allergies    No Known Allergies    Intolerances    Standing Inpatient Medications  atorvastatin 20 milliGRAM(s) Oral at bedtime  cholecalciferol 2000 Unit(s) Oral daily  cyanocobalamin Injectable 1000 MICROGram(s) IntraMuscular once  folic acid 1 milliGRAM(s) Oral daily  glucagon  Injectable 1 milliGRAM(s) IntraMuscular once  insulin lispro (ADMELOG) corrective regimen sliding scale   SubCutaneous every 6 hours  magnesium hydroxide Suspension 30 milliLiter(s) Oral daily  mycophenolate mofetil 500 milliGRAM(s) Oral two times a day  pantoprazole    Tablet 40 milliGRAM(s) Oral two times a day  predniSONE   Tablet 2.5 milliGRAM(s) Oral two times a day  tacrolimus 1 milliGRAM(s) Oral every 12 hours    REVIEW OF SYSTEMS  --------------------------------------------------------------------------------  Gen: no lethargy  Respiratory: No dyspnea  CV: No chest pain  GI: see HPI  MSK: no LE edema  Neuro: No dizziness  Heme: No bleeding    All other systems were reviewed and are negative, except as noted.    VITALS/PHYSICAL EXAM  --------------------------------------------------------------------------------  T(C): 36.3 (12-16-20 @ 10:05), Max: 37.2 (12-15-20 @ 20:48)  HR: 75 (12-16-20 @ 10:48) (75 - 105)  BP: 114/61 (12-16-20 @ 10:48) (108/53 - 204/133)  RR: 20 (12-16-20 @ 10:48) (16 - 20)  SpO2: 99% (12-16-20 @ 10:48) (97% - 100%)  Wt(kg): --  Height (cm): 167.6 (12-16-20 @ 05:10)  Weight (kg): 64.5 (12-16-20 @ 05:10)  BMI (kg/m2): 23 (12-16-20 @ 05:10)  BSA (m2): 1.73 (12-16-20 @ 05:10)    Physical Exam:  	Gen: NAD  	HEENT: MMM  	Pulm: good air entry B/L  	CV: S1S2  	Abd: Soft, +BS   	Ext: No LE edema B/L  	Neuro: Awake  	Skin: Warm and dry  	Vascular access: LUE AVF, mild thrill present    LABS/STUDIES  --------------------------------------------------------------------------------              8.6    9.12  >-----------<  91       [12-16-20 @ 07:47]              27.6     138  |  109  |  73  ----------------------------<  156      [12-16-20 @ 07:47]  3.3   |  19  |  1.45        Ca     7.6     [12-16-20 @ 07:47]      Mg     1.9     [12-16-20 @ 07:47]      Phos  1.9     [12-16-20 @ 07:47]    Creatinine Trend:  SCr 1.45 [12-16 @ 07:47]  SCr 1.67 [12-15 @ 23:56]  SCr 1.67 [12-15 @ 21:29]  SCr 1.64 [12-15 @ 17:39]    Urinalysis - [12-15-20 @ 18:54]      Color Yellow / Appearance Clear / SG 1.025 / pH 5.5      Gluc 200 mg/dL / Ketone Small  / Bili Negative / Urobili <2 mg/dL       Blood Negative / Protein Trace / Leuk Est Negative / Nitrite Negative      RBC  / WBC  / Hyaline  / Gran  / Sq Epi  / Non Sq Epi  / Bacteria     Urine Creatinine 20      [12-15-20 @ 23:56]  Urine Protein <4      [12-15-20 @ 23:56]  Urine Sodium 113      [12-15-20 @ 23:56]  Urine Urea Nitrogen 330.5      [12-15-20 @ 23:56]  Urine Osmolality 381      [12-15-20 @ 23:56]

## 2020-12-16 NOTE — PROGRESS NOTE ADULT - PROBLEM SELECTOR PLAN 8
GOC: full code  DVT prophylaxis: SCDS for now  Activity: ambulate as tolerated  Diet: NPO except meds  Fall, Asp precaution Reportedly taking isosorbide 60mg and carvedilol 25mg BID outpatient  BP controlled off meds at present  Will monitor and restart as needed

## 2020-12-17 ENCOUNTER — TRANSCRIPTION ENCOUNTER (OUTPATIENT)
Age: 65
End: 2020-12-17

## 2020-12-17 DIAGNOSIS — D69.6 THROMBOCYTOPENIA, UNSPECIFIED: ICD-10-CM

## 2020-12-17 DIAGNOSIS — M54.9 DORSALGIA, UNSPECIFIED: ICD-10-CM

## 2020-12-17 DIAGNOSIS — E87.5 HYPERKALEMIA: ICD-10-CM

## 2020-12-17 LAB
ALBUMIN SERPL ELPH-MCNC: 3.6 G/DL — SIGNIFICANT CHANGE UP (ref 3.3–5)
ALP SERPL-CCNC: 61 U/L — SIGNIFICANT CHANGE UP (ref 40–120)
ALT FLD-CCNC: 16 U/L — SIGNIFICANT CHANGE UP (ref 4–41)
ANION GAP SERPL CALC-SCNC: 10 MMOL/L — SIGNIFICANT CHANGE UP (ref 7–14)
AST SERPL-CCNC: 23 U/L — SIGNIFICANT CHANGE UP (ref 4–40)
BASOPHILS # BLD AUTO: 0.07 K/UL — SIGNIFICANT CHANGE UP (ref 0–0.2)
BASOPHILS NFR BLD AUTO: 0.7 % — SIGNIFICANT CHANGE UP (ref 0–2)
BILIRUB SERPL-MCNC: 0.7 MG/DL — SIGNIFICANT CHANGE UP (ref 0.2–1.2)
BUN SERPL-MCNC: 49 MG/DL — HIGH (ref 7–23)
CALCIUM SERPL-MCNC: 9.2 MG/DL — SIGNIFICANT CHANGE UP (ref 8.4–10.5)
CHLORIDE SERPL-SCNC: 102 MMOL/L — SIGNIFICANT CHANGE UP (ref 98–107)
CLOSURE TME COLL+EPINEP BLD: 107 K/UL — LOW (ref 150–400)
CO2 SERPL-SCNC: 23 MMOL/L — SIGNIFICANT CHANGE UP (ref 22–31)
CREAT SERPL-MCNC: 1.51 MG/DL — HIGH (ref 0.5–1.3)
EOSINOPHIL # BLD AUTO: 0.28 K/UL — SIGNIFICANT CHANGE UP (ref 0–0.5)
EOSINOPHIL NFR BLD AUTO: 2.7 % — SIGNIFICANT CHANGE UP (ref 0–6)
GLUCOSE SERPL-MCNC: 152 MG/DL — HIGH (ref 70–99)
HCT VFR BLD CALC: 31.7 % — LOW (ref 39–50)
HCYS SERPL-MCNC: 21.9 UMOL/L — HIGH
HGB BLD-MCNC: 9.7 G/DL — LOW (ref 13–17)
IANC: 7.22 K/UL — SIGNIFICANT CHANGE UP (ref 1.5–8.5)
IMM GRANULOCYTES NFR BLD AUTO: 0.3 % — SIGNIFICANT CHANGE UP (ref 0–1.5)
LYMPHOCYTES # BLD AUTO: 1.43 K/UL — SIGNIFICANT CHANGE UP (ref 1–3.3)
LYMPHOCYTES # BLD AUTO: 13.9 % — SIGNIFICANT CHANGE UP (ref 13–44)
MAGNESIUM SERPL-MCNC: 2.1 MG/DL — SIGNIFICANT CHANGE UP (ref 1.6–2.6)
MCHC RBC-ENTMCNC: 28.5 PG — SIGNIFICANT CHANGE UP (ref 27–34)
MCHC RBC-ENTMCNC: 30.6 GM/DL — LOW (ref 32–36)
MCV RBC AUTO: 93.2 FL — SIGNIFICANT CHANGE UP (ref 80–100)
MONOCYTES # BLD AUTO: 1.25 K/UL — HIGH (ref 0–0.9)
MONOCYTES NFR BLD AUTO: 12.2 % — SIGNIFICANT CHANGE UP (ref 2–14)
NEUTROPHILS # BLD AUTO: 7.22 K/UL — SIGNIFICANT CHANGE UP (ref 1.8–7.4)
NEUTROPHILS NFR BLD AUTO: 70.2 % — SIGNIFICANT CHANGE UP (ref 43–77)
NRBC # BLD: 0 /100 WBCS — SIGNIFICANT CHANGE UP
NRBC # FLD: 0 K/UL — SIGNIFICANT CHANGE UP
PHOSPHATE SERPL-MCNC: 3.1 MG/DL — SIGNIFICANT CHANGE UP (ref 2.5–4.5)
PLATELET # BLD AUTO: 83 K/UL — LOW (ref 150–400)
POTASSIUM SERPL-MCNC: 4.2 MMOL/L — SIGNIFICANT CHANGE UP (ref 3.5–5.3)
POTASSIUM SERPL-SCNC: 4.2 MMOL/L — SIGNIFICANT CHANGE UP (ref 3.5–5.3)
PROT SERPL-MCNC: 5.9 G/DL — LOW (ref 6–8.3)
RBC # BLD: 3.4 M/UL — LOW (ref 4.2–5.8)
RBC # FLD: 15.6 % — HIGH (ref 10.3–14.5)
SARS-COV-2 IGG SERPL QL IA: NEGATIVE — SIGNIFICANT CHANGE UP
SARS-COV-2 IGM SERPL IA-ACNC: <0.1 INDEX — SIGNIFICANT CHANGE UP
SODIUM SERPL-SCNC: 135 MMOL/L — SIGNIFICANT CHANGE UP (ref 135–145)
WBC # BLD: 10.28 K/UL — SIGNIFICANT CHANGE UP (ref 3.8–10.5)
WBC # FLD AUTO: 10.28 K/UL — SIGNIFICANT CHANGE UP (ref 3.8–10.5)

## 2020-12-17 PROCEDURE — 99232 SBSQ HOSP IP/OBS MODERATE 35: CPT | Mod: GC

## 2020-12-17 PROCEDURE — 99233 SBSQ HOSP IP/OBS HIGH 50: CPT | Mod: GC

## 2020-12-17 RX ORDER — CARVEDILOL PHOSPHATE 80 MG/1
1 CAPSULE, EXTENDED RELEASE ORAL
Qty: 0 | Refills: 0 | DISCHARGE

## 2020-12-17 RX ORDER — PANTOPRAZOLE SODIUM 20 MG/1
1 TABLET, DELAYED RELEASE ORAL
Qty: 58 | Refills: 0
Start: 2020-12-17 | End: 2021-01-14

## 2020-12-17 RX ORDER — MYCOPHENOLATE MOFETIL 250 MG/1
1000 CAPSULE ORAL
Refills: 0 | Status: DISCONTINUED | OUTPATIENT
Start: 2020-12-17 | End: 2020-12-18

## 2020-12-17 RX ORDER — ISOSORBIDE MONONITRATE 60 MG/1
1 TABLET, EXTENDED RELEASE ORAL
Qty: 0 | Refills: 0 | DISCHARGE

## 2020-12-17 RX ADMIN — Medication 1 MILLIGRAM(S): at 18:52

## 2020-12-17 RX ADMIN — Medication 2.5 MILLIGRAM(S): at 05:27

## 2020-12-17 RX ADMIN — Medication 1: at 07:49

## 2020-12-17 RX ADMIN — MYCOPHENOLATE MOFETIL 500 MILLIGRAM(S): 250 CAPSULE ORAL at 18:53

## 2020-12-17 RX ADMIN — Medication 1: at 17:04

## 2020-12-17 RX ADMIN — Medication 2000 UNIT(S): at 18:52

## 2020-12-17 RX ADMIN — TACROLIMUS 1 MILLIGRAM(S): 5 CAPSULE ORAL at 05:28

## 2020-12-17 RX ADMIN — PANTOPRAZOLE SODIUM 40 MILLIGRAM(S): 20 TABLET, DELAYED RELEASE ORAL at 18:52

## 2020-12-17 RX ADMIN — PANTOPRAZOLE SODIUM 40 MILLIGRAM(S): 20 TABLET, DELAYED RELEASE ORAL at 05:27

## 2020-12-17 RX ADMIN — Medication 3: at 12:53

## 2020-12-17 RX ADMIN — MYCOPHENOLATE MOFETIL 500 MILLIGRAM(S): 250 CAPSULE ORAL at 05:28

## 2020-12-17 RX ADMIN — Medication 2.5 MILLIGRAM(S): at 18:53

## 2020-12-17 RX ADMIN — ATORVASTATIN CALCIUM 20 MILLIGRAM(S): 80 TABLET, FILM COATED ORAL at 21:34

## 2020-12-17 RX ADMIN — TACROLIMUS 1 MILLIGRAM(S): 5 CAPSULE ORAL at 18:52

## 2020-12-17 RX ADMIN — MAGNESIUM HYDROXIDE 30 MILLILITER(S): 400 TABLET, CHEWABLE ORAL at 18:53

## 2020-12-17 NOTE — PROGRESS NOTE ADULT - PROBLEM SELECTOR PLAN 9
GOC: full code  DVT prophylaxis: SCDS for now  Activity: ambulate as tolerated  Diet: NPO except meds  Fall, Asp precaution Reportedly taking isosorbide 60mg and carvedilol 25mg BID outpatient  -BP controlled off meds at present  -Will monitor and restart as needed outpatient

## 2020-12-17 NOTE — PROGRESS NOTE ADULT - PROBLEM SELECTOR PLAN 6
Reportedly had MI in the past w/ 1x NATALIE, s/p CABG, on ASA at home  - hold ASA in setting of GI bleed  - c/w atorvastatin for now Reportedly had MI in the past w/ 1x NATALIE, s/p CABG, on ASA at home  - hold ASA in setting of GI bleed, restart on discharge  - c/w atorvastatin for now Unknown baseline, per nephro, not likely 2/2 prograf or cellcept  - f/u citrated plts  - will call PCP and transplant nephrologist outpatient to find baseline

## 2020-12-17 NOTE — PROGRESS NOTE ADULT - PROBLEM SELECTOR PLAN 2
Appreciate renal following  We are continuing all home anti-rejection meds. Check Prograf level tomorrow AM (30 minutes prior to AM dose). Unknown baseline Cr.   Appreciate renal following  We are continuing all home anti-rejection meds. Unknown baseline Cr.   -Appreciate renal following  -CTM

## 2020-12-17 NOTE — PROGRESS NOTE ADULT - PROBLEM SELECTOR PLAN 3
c/w prograf 1mg BID, cellcept 500mg BID, prednisone 2.5 BID  Check tacro level daily  US Transplant Kidney with doppler study.   Monitor labs and urine output. Avoid potential nephrotoxins. Dose medications as per eGFR. c/w prograf 1mg BID, cellcept 500mg BID, prednisone 2.5 BID  Check tacro level daily  US Transplant Kidney with doppler study - can do outpatient   Monitor labs and urine output. Avoid potential nephrotoxins. Dose medications as per eGFR.  Will have pt follow up with his outpatient transplant nephrologist in [place] c/w prograf 1mg BID, cellcept 500mg BID, prednisone 2.5 BID  -Check tacro level daily  -US Transplant Kidney with doppler study - can do outpatient  -Monitor labs and urine output. Avoid potential nephrotoxins. Dose medications as per eGFR.  -Will have pt follow up with his outpatient transplant nephrologist in [place]  -We are continuing all home anti-rejection meds.

## 2020-12-17 NOTE — DISCHARGE NOTE PROVIDER - PROVIDER TOKENS
PROVIDER:[TOKEN:[[id number]],FOLLOWUP:[1 week],ESTABLISHEDPATIENT:[T]] PROVIDER:[TOKEN:[[id number]],FOLLOWUP:[1 week],ESTABLISHEDPATIENT:[T]],PROVIDER:[TOKEN:[[id number]]]

## 2020-12-17 NOTE — PROGRESS NOTE ADULT - SUBJECTIVE AND OBJECTIVE BOX
Chief Complaint:  Patient is a 65y old  Male who presents with a chief complaint of hematemesis (17 Dec 2020 06:47)      Interval Events: s/p EGD yesterday with LA grade D esophagitis, esophageal ulcer, HH and gastritis  - feels well today, denies abd pain, chest pain, n/v      Hospital Medications:  atorvastatin 20 milliGRAM(s) Oral at bedtime  cholecalciferol 2000 Unit(s) Oral daily  dextrose 40% Gel 15 Gram(s) Oral once  dextrose 5%. 1000 milliLiter(s) IV Continuous <Continuous>  dextrose 5%. 1000 milliLiter(s) IV Continuous <Continuous>  dextrose 50% Injectable 25 Gram(s) IV Push once  dextrose 50% Injectable 12.5 Gram(s) IV Push once  dextrose 50% Injectable 25 Gram(s) IV Push once  folic acid 1 milliGRAM(s) Oral daily  glucagon  Injectable 1 milliGRAM(s) IntraMuscular once  insulin lispro (ADMELOG) corrective regimen sliding scale   SubCutaneous three times a day before meals  insulin lispro (ADMELOG) corrective regimen sliding scale   SubCutaneous at bedtime  magnesium hydroxide Suspension 30 milliLiter(s) Oral daily  mycophenolate mofetil 500 milliGRAM(s) Oral two times a day  pantoprazole    Tablet 40 milliGRAM(s) Oral two times a day  predniSONE   Tablet 2.5 milliGRAM(s) Oral two times a day  tacrolimus 1 milliGRAM(s) Oral every 12 hours      PMHX/PSHX:  High cholesterol    HTN (hypertension)    DM (diabetes mellitus)    CAD (coronary artery disease)    S/P kidney transplant    S/P triple vessel bypass            ROS:     General:  No weight loss, fevers, chills, night sweats, fatigue   Eyes:  No vision changes  ENT:  No sore throat, pain, runny nose  CV:  No chest pain, palpitations, dizziness   Resp:  No SOB, cough, wheezing  GI:  See HPI  :  No burning with urination, hematuria  Muscle:  No pain, weakness  Neuro:  No weakness/tingling, memory problems  Psych:  No fatigue, insomnia, mood problems, depression  Heme:  No easy bruisability  Skin:  No rash, edema      PHYSICAL EXAM:     GENERAL:  Well developed, no distress  HEENT:  NC/AT,  conjunctivae clear, sclera anicteric  CHEST:  Full & symmetric excursion, no increased effort w/ respirations  HEART:  Regular rhythm & rate  ABDOMEN:  Soft, non-tender, non-distended  EXTREMITIES:  no LE  edema  SKIN:  No rash/erythema/ecchymoses/petechiae/wounds/jaundice  NEURO:  Alert, oriented    Vital Signs:  Vital Signs Last 24 Hrs  T(C): 36.6 (17 Dec 2020 08:00), Max: 37.1 (16 Dec 2020 16:50)  T(F): 97.9 (17 Dec 2020 08:00), Max: 98.8 (16 Dec 2020 16:50)  HR: 88 (17 Dec 2020 08:00) (75 - 92)  BP: 106/56 (17 Dec 2020 08:00) (106/56 - 128/68)  BP(mean): --  RR: 17 (17 Dec 2020 08:00) (16 - 20)  SpO2: 100% (17 Dec 2020 08:00) (97% - 100%)  Daily     Daily     LABS:                        9.7    10.28 )-----------( 83       ( 17 Dec 2020 06:00 )             31.7     12    135  |  102  |  49<H>  ----------------------------<  152<H>  4.2   |  23  |  1.51<H>    Ca    9.2      17 Dec 2020 06:00  Phos  3.1     12-  Mg     2.1         TPro  5.9<L>  /  Alb  3.6  /  TBili  0.7  /  DBili  x   /  AST  23  /  ALT  16  /  AlkPhos  61  12-17    LIVER FUNCTIONS - ( 17 Dec 2020 06:00 )  Alb: 3.6 g/dL / Pro: 5.9 g/dL / ALK PHOS: 61 U/L / ALT: 16 U/L / AST: 23 U/L / GGT: x           PT/INR - ( 15 Dec 2020 17:39 )   PT: 15.6 sec;   INR: 1.38 ratio         PTT - ( 15 Dec 2020 17:39 )  PTT:31.5 sec  Urinalysis Basic - ( 15 Dec 2020 18:54 )    Color: Yellow / Appearance: Clear / S.025 / pH: x  Gluc: x / Ketone: Small  / Bili: Negative / Urobili: <2 mg/dL   Blood: x / Protein: Trace / Nitrite: Negative   Leuk Esterase: Negative / RBC: x / WBC x   Sq Epi: x / Non Sq Epi: x / Bacteria: x          Imaging:           Chief Complaint:  Patient is a 65y old  Male who presents with a chief complaint of hematemesis (17 Dec 2020 06:47)      Interval Events: s/p EGD yesterday with LA grade D esophagitis, esophageal ulcer, HH and gastritis  - feels well today, denies abd pain, chest pain, n/v      Hospital Medications:  atorvastatin 20 milliGRAM(s) Oral at bedtime  cholecalciferol 2000 Unit(s) Oral daily  dextrose 40% Gel 15 Gram(s) Oral once  dextrose 5%. 1000 milliLiter(s) IV Continuous <Continuous>  dextrose 5%. 1000 milliLiter(s) IV Continuous <Continuous>  dextrose 50% Injectable 25 Gram(s) IV Push once  dextrose 50% Injectable 12.5 Gram(s) IV Push once  dextrose 50% Injectable 25 Gram(s) IV Push once  folic acid 1 milliGRAM(s) Oral daily  glucagon  Injectable 1 milliGRAM(s) IntraMuscular once  insulin lispro (ADMELOG) corrective regimen sliding scale   SubCutaneous three times a day before meals  insulin lispro (ADMELOG) corrective regimen sliding scale   SubCutaneous at bedtime  magnesium hydroxide Suspension 30 milliLiter(s) Oral daily  mycophenolate mofetil 500 milliGRAM(s) Oral two times a day  pantoprazole    Tablet 40 milliGRAM(s) Oral two times a day  predniSONE   Tablet 2.5 milliGRAM(s) Oral two times a day  tacrolimus 1 milliGRAM(s) Oral every 12 hours      PMHX/PSHX:    High cholesterol  HTN (hypertension)  DM (diabetes mellitus)  CAD (coronary artery disease)  S/P kidney transplant  S/P triple vessel bypass      ROS:     General:  No weight loss, fevers, chills, night sweats, fatigue   Eyes:  No vision changes  ENT:  No sore throat, pain, runny nose  CV:  No chest pain, palpitations, dizziness   Resp:  No SOB, cough, wheezing  GI:  See HPI  :  No burning with urination, hematuria  Muscle:  No pain, weakness  Neuro:  No weakness/tingling, memory problems  Psych:  No fatigue, insomnia, mood problems, depression  Heme:  No easy bruisability  Skin:  No rash, edema      PHYSICAL EXAM:     GENERAL:  Well developed, no distress  HEENT:  NC/AT,  conjunctivae clear, sclera anicteric  CHEST:  Full & symmetric excursion, no increased effort w/ respirations  HEART:  Regular rhythm & rate  ABDOMEN:  Soft, non-tender, non-distended  EXTREMITIES:  no LE  edema  SKIN:  No rash/erythema/ecchymoses/petechiae/wounds/jaundice  NEURO:  Alert, oriented    Vital Signs:  Vital Signs Last 24 Hrs  T(C): 36.6 (17 Dec 2020 08:00), Max: 37.1 (16 Dec 2020 16:50)  T(F): 97.9 (17 Dec 2020 08:00), Max: 98.8 (16 Dec 2020 16:50)  HR: 88 (17 Dec 2020 08:00) (75 - 92)  BP: 106/56 (17 Dec 2020 08:00) (106/56 - 128/68)  BP(mean): --  RR: 17 (17 Dec 2020 08:00) (16 - 20)  SpO2: 100% (17 Dec 2020 08:00) (97% - 100%)  Daily     Daily     LABS:                        9.7    10.28 )-----------( 83       ( 17 Dec 2020 06:00 )             31.7     12    135  |  102  |  49<H>  ----------------------------<  152<H>  4.2   |  23  |  1.51<H>    Ca    9.2      17 Dec 2020 06:00  Phos  3.1     12-  Mg     2.1         TPro  5.9<L>  /  Alb  3.6  /  TBili  0.7  /  DBili  x   /  AST  23  /  ALT  16  /  AlkPhos  61  12-17    LIVER FUNCTIONS - ( 17 Dec 2020 06:00 )  Alb: 3.6 g/dL / Pro: 5.9 g/dL / ALK PHOS: 61 U/L / ALT: 16 U/L / AST: 23 U/L / GGT: x           PT/INR - ( 15 Dec 2020 17:39 )   PT: 15.6 sec;   INR: 1.38 ratio         PTT - ( 15 Dec 2020 17:39 )  PTT:31.5 sec  Urinalysis Basic - ( 15 Dec 2020 18:54 )    Color: Yellow / Appearance: Clear / S.025 / pH: x  Gluc: x / Ketone: Small  / Bili: Negative / Urobili: <2 mg/dL   Blood: x / Protein: Trace / Nitrite: Negative   Leuk Esterase: Negative / RBC: x / WBC x   Sq Epi: x / Non Sq Epi: x / Bacteria: x          Imaging:

## 2020-12-17 NOTE — PROGRESS NOTE ADULT - PROBLEM SELECTOR PLAN 7
H/o T2DM, reportedly taking lantus 16 units and novolog 4units premeals  Diet to be resumed tonight, will increase lantus 8-->12 while inpatient.  Still holding premeal H/o T2DM, reportedly taking lantus 16 units and novolog 4units premeals  CC diet  lantus at 12 while inpatient.  Still holding premeal Reportedly had MI in the past w/ 1x NATALIE, s/p CABG, on ASA at home  - hold ASA in setting of GI bleed, restart on discharge  - c/w atorvastatin for now

## 2020-12-17 NOTE — DISCHARGE NOTE PROVIDER - NSDCCPCAREPLAN_GEN_ALL_CORE_FT
PRINCIPAL DISCHARGE DIAGNOSIS  Diagnosis: Hematemesis  Assessment and Plan of Treatment: You came into the hospital because you were coughing up blood. We think this was because you have inflammation in your esophagus. You had an endoscopy procedure done with the GI doctors where they looked with a camera and saw inflammation in your stomach and esophagus and an ulcer in your esophagus. You will need to take a new medication called protonix, which reduces the acid in your stomach, twice a day for 1 month. Please follow up with the GI doctors in clinic.      SECONDARY DISCHARGE DIAGNOSES  Diagnosis: Renal transplant recipient  Assessment and Plan of Treatment: You were seen by the kidney doctors. Your kidney numbers are high (creatinine is 1.5). Please follow up with your transplant kidney doctor in [place] and continue to take your transplant medications.    Diagnosis: Back pain  Assessment and Plan of Treatment:     Diagnosis: Hyperkalemia  Assessment and Plan of Treatment:

## 2020-12-17 NOTE — DISCHARGE NOTE PROVIDER - NSFOLLOWUPCLINICS_GEN_ALL_ED_FT
[facility] [place]  Gastroenterology  [address] [place] [address]  Phone: [phone]  Fax:     [facility]  Nephrology  [address] [place] [address], [address] [place] [address]  Phone: [phone]  Fax:   Follow Up Time:      [facility] [place]  Gastroenterology  [address] [place] [address]  Phone: [phone]  Fax:

## 2020-12-17 NOTE — DISCHARGE NOTE PROVIDER - NSDCMRMEDTOKEN_GEN_ALL_CORE_FT
aspirin 81 mg oral tablet: 1 tab(s) orally once a day  atorvastatin 20 mg oral tablet: 1 tab(s) orally once a day  carvedilol 25 mg oral tablet: 1 tab(s) orally 2 times a day  CellCept 500 mg oral tablet: 2 tab(s) orally 2 times a day  folic acid 1 mg oral tablet: 1 tab(s) orally once a day  isosorbide mononitrate 60 mg oral tablet, extended release: 1 tab(s) orally once a day (in the morning)  Lantus 100 units/mL subcutaneous solution: 16 unit(s) subcutaneous once a day (at bedtime)  magnesium hydroxide 400 mg oral tablet, chewable: 1 tab(s) orally 2 times a day  metFORMIN 500 mg oral tablet, extended release: 1 tab(s) orally 3 times a day  NovoLOG 100 units/mL subcutaneous solution: 4 unit(s) subcutaneous 3 times a day (before meals)  pantoprazole 40 mg oral delayed release tablet: 1 tab(s) orally 2 times a day  predniSONE 2.5 mg oral tablet: 1 tab(s) orally 2 times a day  Prograf 1 mg oral capsule: 1 cap(s) orally every 12 hours  Vitamin D3 2000 intl units (50 mcg) oral tablet: 1 tab(s) orally once a day   aspirin 81 mg oral tablet: 1 tab(s) orally once a day  atorvastatin 20 mg oral tablet: 1 tab(s) orally once a day  CellCept 500 mg oral tablet: 2 tab(s) orally 2 times a day  folic acid 1 mg oral tablet: 1 tab(s) orally once a day  Lantus 100 units/mL subcutaneous solution: 16 unit(s) subcutaneous once a day (at bedtime)  magnesium hydroxide 400 mg oral tablet, chewable: 1 tab(s) orally 2 times a day  metFORMIN 500 mg oral tablet, extended release: 1 tab(s) orally 3 times a day  NovoLOG 100 units/mL subcutaneous solution: 4 unit(s) subcutaneous 3 times a day (before meals)  pantoprazole 40 mg oral delayed release tablet: 1 tab(s) orally 2 times a day  predniSONE 2.5 mg oral tablet: 1 tab(s) orally 2 times a day  Prograf 1 mg oral capsule: 1 cap(s) orally every 12 hours  Vitamin D3 2000 intl units (50 mcg) oral tablet: 1 tab(s) orally once a day   aspirin 81 mg oral tablet: 1 tab(s) orally once a day  atorvastatin 20 mg oral tablet: 1 tab(s) orally once a day  CellCept 500 mg oral tablet: 2 tab(s) orally 2 times a day  cyanocobalamin 1000 mcg oral tablet: 1 tab(s) orally once a day  folic acid 1 mg oral tablet: 1 tab(s) orally once a day  Lantus 100 units/mL subcutaneous solution: 16 unit(s) subcutaneous once a day (at bedtime)  magnesium hydroxide 400 mg oral tablet, chewable: 1 tab(s) orally 2 times a day  metFORMIN 500 mg oral tablet, extended release: 1 tab(s) orally 3 times a day  NovoLOG 100 units/mL subcutaneous solution: 4 unit(s) subcutaneous 3 times a day (before meals)  pantoprazole 40 mg oral delayed release tablet: 1 tab(s) orally 2 times a day  predniSONE 2.5 mg oral tablet: 1 tab(s) orally 2 times a day  Prograf 1 mg oral capsule: 1 cap(s) orally every 12 hours  Vitamin D3 2000 intl units (50 mcg) oral tablet: 1 tab(s) orally once a day

## 2020-12-17 NOTE — PROGRESS NOTE ADULT - ATTENDING COMMENTS
Patient seen and examined on 12/17. Case discussed with the medical team on rounds. I agree with the findings and the plan above.    Wily Mcfadden is a 65 year old gentlemen with h/o CAD s/p CABG and kidney transplant 4-5 yrs ago on Prograf daily p/w hematemesis x1  and back pain. No further episodes of hematemesis in ED. On admission found to be hyperkalemic to 6.2, Lokelma and cocktail administered. No EKG changes. US kidney with no evidence of rejection.   GI and nephrology consulted    Hb drop noted 2/2 to possible upper GI bleed, monitor hb  patient s/p EGD w/evidence of esophagitis  Blood work reveals low B12 levels, patient states that he does eat meat  Continue the rest of the work up and management as stated above

## 2020-12-17 NOTE — DISCHARGE NOTE PROVIDER - CARE PROVIDER_API CALL
Maia Velázquez [facility]  Internal Medicine  [address] [place] [address]  Phone: [phone]  Fax: [phone]  Established Patient  Follow Up Time: 1 week   Maia Velázquez Alex  Internal Medicine  [address] [place] [address]  Phone: [phone]  Fax: [phone]  Established Patient  Follow Up Time: 1 week    Wendy Hernandez)  Internal Medicine  [address] [facility], [place] [address]  Phone: [phone]  Follow Up Time:

## 2020-12-17 NOTE — DISCHARGE NOTE PROVIDER - CARE PROVIDERS DIRECT ADDRESSES
,DirectAddress_Unknown ,DirectAddress_Unknown,[email or web address]

## 2020-12-17 NOTE — PROGRESS NOTE ADULT - PROBLEM SELECTOR PLAN 1
s/p EGD 12/16 - showed gastritis, esophagitis, 1 esophageal ulcer, hiatal hernia.  protonix BID for 1 month  Will monitor H and H  No urgent indication for blood transfusion at present  Set up outpatient GI s/p EGD 12/16 - showed gastritis, esophagitis, 1 esophageal ulcer, hiatal hernia.  protonix BID for 1 month  Will monitor H and H  Set up outpatient GI s/p EGD 12/16 - showed gastritis, esophagitis, 1 esophageal ulcer, hiatal hernia.  -protonix BID for 1 month  -Will monitor H and H  -Set up outpatient GI

## 2020-12-17 NOTE — DISCHARGE NOTE PROVIDER - NSDCCPTREATMENT_GEN_ALL_CORE_FT
PRINCIPAL PROCEDURE  Procedure: Upper endoscopy  Findings and Treatment: You had an endoscopy done with the GI doctors. Please follow up with the GI doctors outpatient.

## 2020-12-17 NOTE — DISCHARGE NOTE PROVIDER - HOSPITAL COURSE
HPI:  Pt is a 64 yo M w/ h/o CAD w/ 1x NATALIE, CABG in 2005, R kidney transplant in 2015 on prednisone, prograf and cellcept, T2DM, HTN presenting for 1x episode of hematemesis. Pt reports waking this morning w/ 1 episode of "small amount" of bloody vomit. Pt reports that this has never happened to him before. Pt reports that he is only on ASA since the CABG, and otherwise he is not on any other blood thinners. Pt reports that he got both of his CABG and kidney transplant in [place], [place] (no records in HIE or allscripts). Pt otherwise is complaining of mild lower back pain. Otherwise, denies fevers, chills, chest p/p, sob, diarrhea, sick contacts, recent travel.     At the ED, pt vitals: /67, , saturating well on RA, afebrile. CBC sig for mild leukocytosis to 12, Hg 11 (unclear baseline), CMP sig for K of 6.2, Crt 1.64 (unclear baseline), proBNP elevated at 2888. US kidney showing no rejection, CXR clear. Pt seen by renal, given insulin 5units, lasix 40mg IV, lokelma 10, admitted to medicine for further observation/workup    Hospital Course:  Pt was seen by the GI team and endoscopy was done, showing esophagitis, gastritis, 1 esophageal ulcer and hiatal hernia. Hematemesis likely secondary to esophagitis seen on EGD. Pt started on pantoprazole 40mg BID (total course 1 month). Pt also seen by nephrology. Unknown baseline creatinine, 1.5 on discharge. Pt instructed to follow up with outpatient transplant kidney nephrologist in [place], [place]. HPI:  Pt is a 64 yo M w/ h/o CAD w/ 1x NATALIE, CABG in 2005, R kidney transplant in 2015 on prednisone, prograf and cellcept, T2DM, HTN presenting for 1x episode of hematemesis. Pt reports waking this morning w/ 1 episode of "small amount" of bloody vomit. Pt reports that this has never happened to him before. Pt reports that he is only on ASA since the CABG, and otherwise he is not on any other blood thinners. Pt reports that he got both of his CABG and kidney transplant in [place], [place] (no records in HIE or allscripts). Pt otherwise is complaining of mild lower back pain. Otherwise, denies fevers, chills, chest p/p, sob, diarrhea, sick contacts, recent travel.     At the ED, pt vitals: /67, , saturating well on RA, afebrile. CBC sig for mild leukocytosis to 12, Hg 11 (unclear baseline), CMP sig for K of 6.2, Crt 1.64 (unclear baseline), proBNP elevated at 2888. US kidney showing no rejection, CXR clear. Pt seen by renal, given insulin 5units, lasix 40mg IV, lokelma 10, admitted to medicine for further observation/workup    Hospital Course:  Pt was seen by the GI team and endoscopy was done, showing esophagitis, gastritis, 1 esophageal ulcer and hiatal hernia. Hematemesis likely secondary to esophagitis seen on EGD. Pt started on pantoprazole 40mg BID (total course 1 month). Pt also seen by nephrology. Unknown baseline creatinine, 1.5 on discharge. Pt instructed to follow up with outpatient transplant kidney nephrologist in [place], [place]. Pt's blood pressure controlled off BP medications, will have pt follow up with outpatient PCP to restart carvedilol 25mg BID and isosorbide 60mg daily. Pt found to have mixed picture normocytic anemia likely from anemia of chronic disease (high ferritin) and B12 deficiency. Pt given 1 dose 1000mcg B12 on 12/16, will have pt follow up with outpatient PCP to get 2 more doses qweekly of B12. HPI:  Pt is a 64 yo M w/ h/o CAD w/ 1x NATALIE, CABG in 2005, R kidney transplant in 2015 on prednisone, prograf and cellcept, T2DM, HTN presenting for 1x episode of hematemesis. Pt reports waking this morning w/ 1 episode of "small amount" of bloody vomit. Pt reports that this has never happened to him before. Pt reports that he is only on ASA since the CABG, and otherwise he is not on any other blood thinners. Pt reports that he got both of his CABG and kidney transplant in [place], [place] (no records in HIE or allscripts). Pt otherwise is complaining of mild lower back pain. Otherwise, denies fevers, chills, chest p/p, sob, diarrhea, sick contacts, recent travel.     At the ED, pt vitals: /67, , saturating well on RA, afebrile. CBC sig for mild leukocytosis to 12, Hg 11 (unclear baseline), CMP sig for K of 6.2, Crt 1.64 (unclear baseline), proBNP elevated at 2888. US kidney showing no rejection, CXR clear. Pt seen by renal, given insulin 5units, lasix 40mg IV, lokelma 10, admitted to medicine for further observation/workup    Hospital Course:  Pt was seen by the GI team and endoscopy was done, showing esophagitis, gastritis, 1 esophageal ulcer and hiatal hernia. Hematemesis likely secondary to esophagitis seen on EGD. Pt started on pantoprazole 40mg BID (total course 1 month). Pt also seen by nephrology. Unknown baseline creatinine, 1.5 on discharge. Pt instructed to follow up with outpatient transplant kidney nephrologist in [place], [place]. Pt's blood pressure controlled off BP medications, will have pt follow up with outpatient PCP to restart carvedilol 25mg BID and isosorbide 60mg daily. Pt found to have mixed picture normocytic anemia likely from anemia of chronic disease (high ferritin) and B12 deficiency. Pt given 1 dose 1000mcg B12 on 12/16, will have pt follow up with outpatient PCP to get 2 more doses qweekly of B12. Left message for PCP Dr. Maia Velázquez. HPI:  Pt is a 64 yo M w/ h/o CAD w/ 1x NATALIE, CABG in 2005, R kidney transplant in 2015 on prednisone, prograf and cellcept, T2DM, HTN presenting for 1x episode of hematemesis. He was seen by the GI team and endoscopy was done, showing esophagitis, gastritis, 1 esophageal ulcer and hiatal hernia. Hematemesis likely secondary to esophagitis seen on EGD. Pt started on pantoprazole 40mg BID (total course 1 month). Pt also seen by nephrology. Unknown baseline creatinine, 1.5 on discharge. Pt instructed to follow up with outpatient transplant kidney nephrologist in [place], [place]. Pt's blood pressure controlled off BP medications, will have pt follow up with outpatient PCP to restart carvedilol 25mg BID and isosorbide 60mg daily. Pt found to have mixed picture normocytic anemia likely from anemia of chronic disease (high ferritin) and B12 deficiency. Pt given 1 dose 1000mcg B12 on 12/16, will have pt follow up with outpatient PCP to get 2 more doses qweekly of B12. Left message for PCP Dr. Maia Velázquez. HPI:  Pt is a 66 yo M w/ h/o CAD w/ 1x NATALIE, CABG in 2005, R kidney transplant in 2015 on prednisone, prograf and cellcept, T2DM, HTN presenting for 1x episode of hematemesis. He was seen by the GI team and endoscopy was done, showing esophagitis, gastritis, 1 esophageal ulcer and hiatal hernia. Hematemesis likely secondary to esophagitis seen on EGD. Pt started on pantoprazole 40mg BID (total course 1 month). Pt also seen by nephrology. Unknown baseline creatinine, 1.5 on discharge. Pt instructed to follow up with outpatient transplant kidney nephrologist in [place], [place]. Pt's blood pressure controlled off BP medications, will have pt follow up with outpatient PCP to restart carvedilol 25mg BID and isosorbide 60mg daily. Pt found to have mixed picture normocytic anemia likely from anemia of chronic disease (high ferritin) and B12 deficiency. Pt given 1 dose 1000mcg B12 on 12/16, will have pt follow up with outpatient PCP to have levels rechecked after supplementation. He was found to have thrombocytopenia as well, and will follow up with hematology at the [facility].

## 2020-12-17 NOTE — DISCHARGE NOTE PROVIDER - NSDCCAREPROVSEEN_GEN_ALL_CORE_FT
Abdiel, Randa Mann, Adeline Armstrong, Jeferson Park, Hyeokchan Hellerman, Ricki Lopez, Alley Gupta, Trae Orona, Zhanna Madison, Mike Bailey, Fadia Adams, Robert Parada, Michael Miramontes

## 2020-12-17 NOTE — PROGRESS NOTE ADULT - SUBJECTIVE AND OBJECTIVE BOX
PROGRESS NOTE:   Authored by Fadia Bailey MD  Pager [phone] [facility] / [phone] [facility]    Patient is a 65y old  Male who presents with a chief complaint of hematemesis (16 Dec 2020 12:44)      SUBJECTIVE / OVERNIGHT EVENTS:    MEDICATIONS  (STANDING):  atorvastatin 20 milliGRAM(s) Oral at bedtime  cholecalciferol 2000 Unit(s) Oral daily  dextrose 40% Gel 15 Gram(s) Oral once  dextrose 5%. 1000 milliLiter(s) (50 mL/Hr) IV Continuous <Continuous>  dextrose 5%. 1000 milliLiter(s) (100 mL/Hr) IV Continuous <Continuous>  dextrose 50% Injectable 25 Gram(s) IV Push once  dextrose 50% Injectable 12.5 Gram(s) IV Push once  dextrose 50% Injectable 25 Gram(s) IV Push once  folic acid 1 milliGRAM(s) Oral daily  glucagon  Injectable 1 milliGRAM(s) IntraMuscular once  insulin lispro (ADMELOG) corrective regimen sliding scale   SubCutaneous three times a day before meals  insulin lispro (ADMELOG) corrective regimen sliding scale   SubCutaneous at bedtime  magnesium hydroxide Suspension 30 milliLiter(s) Oral daily  mycophenolate mofetil 500 milliGRAM(s) Oral two times a day  pantoprazole    Tablet 40 milliGRAM(s) Oral two times a day  predniSONE   Tablet 2.5 milliGRAM(s) Oral two times a day  tacrolimus 1 milliGRAM(s) Oral every 12 hours    MEDICATIONS  (PRN):      CAPILLARY BLOOD GLUCOSE      POCT Blood Glucose.: 218 mg/dL (16 Dec 2020 22:42)  POCT Blood Glucose.: 266 mg/dL (16 Dec 2020 21:09)  POCT Blood Glucose.: 186 mg/dL (16 Dec 2020 17:13)  POCT Blood Glucose.: 147 mg/dL (16 Dec 2020 12:01)  POCT Blood Glucose.: 143 mg/dL (16 Dec 2020 10:37)  POCT Blood Glucose.: 173 mg/dL (16 Dec 2020 09:27)  POCT Blood Glucose.: 177 mg/dL (16 Dec 2020 07:50)    I&O's Summary    16 Dec 2020 07:01  -  17 Dec 2020 06:47  --------------------------------------------------------  IN: 90 mL / OUT: 100 mL / NET: -10 mL        PHYSICAL EXAM:  Vital Signs Last 24 Hrs  T(C): 36.6 (17 Dec 2020 04:19), Max: 37.1 (16 Dec 2020 16:50)  T(F): 97.8 (17 Dec 2020 04:19), Max: 98.8 (16 Dec 2020 16:50)  HR: 85 (17 Dec 2020 04:19) (75 - 92)  BP: 126/68 (17 Dec 2020 04:19) (108/53 - 128/68)  BP(mean): --  RR: 18 (17 Dec 2020 04:19) (16 - 20)  SpO2: 100% (17 Dec 2020 04:19) (97% - 100%)    GEN: Well appearing, in no apparent distress  EYES: PERRL, EOMI, no scleral icterus  HEAD/NECK: Normocephalic, atraumatic, no cervical lymphadenopathy  RESP: no accessory muscle use, B/L air entry, CTAB   CARDIO: regular rate/rhythm, no LE edema B/L  ABD: soft, NT, ND, +BS  PSYCH: A&Ox3, normal affect  SKIN: warm, dry, in tact, no rashes  NEURO: no focal neurologic deficits appreciated  EXT: no lower extremity edema, no cyanosis  VASC: good peripheral pulses    LABS:                        9.7    10.28 )-----------( 83       ( 17 Dec 2020 06:00 )             31.7     12-    135  |  102  |  49<H>  ----------------------------<  152<H>  4.2   |  23  |  1.51<H>    Ca    9.2      17 Dec 2020 06:00  Phos  3.1     12-  Mg     2.1     12-    TPro  5.9<L>  /  Alb  3.6  /  TBili  0.7  /  DBili  x   /  AST  23  /  ALT  16  /  AlkPhos  61  12-17    PT/INR - ( 15 Dec 2020 17:39 )   PT: 15.6 sec;   INR: 1.38 ratio         PTT - ( 15 Dec 2020 17:39 )  PTT:31.5 sec  CARDIAC MARKERS ( 15 Dec 2020 19:55 )  x     / x     / 67 U/L / x     / 5.6 ng/mL      Urinalysis Basic - ( 15 Dec 2020 18:54 )    Color: Yellow / Appearance: Clear / S.025 / pH: x  Gluc: x / Ketone: Small  / Bili: Negative / Urobili: <2 mg/dL   Blood: x / Protein: Trace / Nitrite: Negative   Leuk Esterase: Negative / RBC: x / WBC x   Sq Epi: x / Non Sq Epi: x / Bacteria: x          RADIOLOGY & ADDITIONAL TESTS:    < from: Upper Endoscopy (20 @ 09:19) >  Findings:       LA Grade D (one or more mucosal breaks involving at least 75% of        esophageal circumference) esophagitis with no bleeding was found in the        lower third of the esophagus.       One superficial esophageal ulcer with no bleeding and stigmata of recent        bleeding with flat pigmented spot was found at the gastroesophageal        junction.       The Z-line was found 40 cm from the incisors.       A medium-sized hiatal hernia was present. Hill Grade 3.       Scattered mild inflammation characterized by erythema was found in the        gastric body and in the gastric antrum.       The exam of the stomach was otherwise normal.       The examined duodenum up to the 2nd part was normal.                                                                   Impression:          - LA Grade D esophagitis with non-bleeding esophageal                        ulcer.                       - Medium-sized hiatal hernia.                       - Gastritis.                       - Normal examined duodenum.                       - No specimens collected.  Recommendation:      - [facility]                       - Start PPI 40 mg PO BID continue for one month.                      - Advance diet as tolerated.                                                                                     < end of copied text >   PROGRESS NOTE:   Authored by Fadia Bailey MD  Pager [phone] [facility] / [phone] [facility]    Patient is a 65y old  Male who presents with a chief complaint of hematemesis (16 Dec 2020 12:44)      SUBJECTIVE / OVERNIGHT EVENTS:  No acute events overnight.      MEDICATIONS  (STANDING):  atorvastatin 20 milliGRAM(s) Oral at bedtime  cholecalciferol 2000 Unit(s) Oral daily  dextrose 40% Gel 15 Gram(s) Oral once  dextrose 5%. 1000 milliLiter(s) (50 mL/Hr) IV Continuous <Continuous>  dextrose 5%. 1000 milliLiter(s) (100 mL/Hr) IV Continuous <Continuous>  dextrose 50% Injectable 25 Gram(s) IV Push once  dextrose 50% Injectable 12.5 Gram(s) IV Push once  dextrose 50% Injectable 25 Gram(s) IV Push once  folic acid 1 milliGRAM(s) Oral daily  glucagon  Injectable 1 milliGRAM(s) IntraMuscular once  insulin lispro (ADMELOG) corrective regimen sliding scale   SubCutaneous three times a day before meals  insulin lispro (ADMELOG) corrective regimen sliding scale   SubCutaneous at bedtime  magnesium hydroxide Suspension 30 milliLiter(s) Oral daily  mycophenolate mofetil 500 milliGRAM(s) Oral two times a day  pantoprazole    Tablet 40 milliGRAM(s) Oral two times a day  predniSONE   Tablet 2.5 milliGRAM(s) Oral two times a day  tacrolimus 1 milliGRAM(s) Oral every 12 hours    MEDICATIONS  (PRN):      CAPILLARY BLOOD GLUCOSE      POCT Blood Glucose.: 218 mg/dL (16 Dec 2020 22:42)  POCT Blood Glucose.: 266 mg/dL (16 Dec 2020 21:09)  POCT Blood Glucose.: 186 mg/dL (16 Dec 2020 17:13)  POCT Blood Glucose.: 147 mg/dL (16 Dec 2020 12:01)  POCT Blood Glucose.: 143 mg/dL (16 Dec 2020 10:37)  POCT Blood Glucose.: 173 mg/dL (16 Dec 2020 09:27)  POCT Blood Glucose.: 177 mg/dL (16 Dec 2020 07:50)    I&O's Summary    16 Dec 2020 07:01  -  17 Dec 2020 06:47  --------------------------------------------------------  IN: 90 mL / OUT: 100 mL / NET: -10 mL        PHYSICAL EXAM:  Vital Signs Last 24 Hrs  T(C): 36.6 (17 Dec 2020 04:19), Max: 37.1 (16 Dec 2020 16:50)  T(F): 97.8 (17 Dec 2020 04:19), Max: 98.8 (16 Dec 2020 16:50)  HR: 85 (17 Dec 2020 04:19) (75 - 92)  BP: 126/68 (17 Dec 2020 04:19) (108/53 - 128/68)  BP(mean): --  RR: 18 (17 Dec 2020 04:19) (16 - 20)  SpO2: 100% (17 Dec 2020 04:) (97% - 100%)    GEN: Well appearing, in no apparent distress  EYES: PERRL, EOMI, no scleral icterus  HEAD/NECK: Normocephalic, atraumatic, no cervical lymphadenopathy  RESP: no accessory muscle use, B/L air entry, CTAB   CARDIO: regular rate/rhythm, no LE edema B/L  ABD: soft, NT, ND, +BS  PSYCH: A&Ox3, normal affect  SKIN: warm, dry, in tact, no rashes  NEURO: no focal neurologic deficits appreciated  EXT: no lower extremity edema, no cyanosis  VASC: good peripheral pulses    LABS:                        9.7    10.28 )-----------( 83       ( 17 Dec 2020 06:00 )             31.7     12-    135  |  102  |  49<H>  ----------------------------<  152<H>  4.2   |  23  |  1.51<H>    Ca    9.2      17 Dec 2020 06:00  Phos  3.1     12-  Mg     2.1     12-17    TPro  5.9<L>  /  Alb  3.6  /  TBili  0.7  /  DBili  x   /  AST  23  /  ALT  16  /  AlkPhos  61  12-17    PT/INR - ( 15 Dec 2020 17:39 )   PT: 15.6 sec;   INR: 1.38 ratio         PTT - ( 15 Dec 2020 17:39 )  PTT:31.5 sec  CARDIAC MARKERS ( 15 Dec 2020 19:55 )  x     / x     / 67 U/L / x     / 5.6 ng/mL      Urinalysis Basic - ( 15 Dec 2020 18:54 )    Color: Yellow / Appearance: Clear / S.025 / pH: x  Gluc: x / Ketone: Small  / Bili: Negative / Urobili: <2 mg/dL   Blood: x / Protein: Trace / Nitrite: Negative   Leuk Esterase: Negative / RBC: x / WBC x   Sq Epi: x / Non Sq Epi: x / Bacteria: x          RADIOLOGY & ADDITIONAL TESTS:    < from: Upper Endoscopy (20 @ 09:19) >  Findings:       LA Grade D (one or more mucosal breaks involving at least 75% of        esophageal circumference) esophagitis with no bleeding was found in the        lower third of the esophagus.       One superficial esophageal ulcer with no bleeding and stigmata of recent        bleeding with flat pigmented spot was found at the gastroesophageal        junction.       The Z-line was found 40 cm from the incisors.       A medium-sized hiatal hernia was present. Hill Grade 3.       Scattered mild inflammation characterized by erythema was found in the        gastric body and in the gastric antrum.       The exam of the stomach was otherwise normal.       The examined duodenum up to the 2nd part was normal.                                                                   Impression:          - LA Grade D esophagitis with non-bleeding esophageal                        ulcer.                       - Medium-sized hiatal hernia.                       - Gastritis.                       - Normal examined duodenum.                       - No specimens collected.  Recommendation:      - [facility]                       - Start PPI 40 mg PO BID continue for one month.                      - Advance diet as tolerated.                                                                                     < end of copied text >   PROGRESS NOTE:   Authored by Fadia Bailey MD  Pager [phone] [facility] / [id number] [facility]    Patient is a 65y old  Male who presents with a chief complaint of hematemesis (16 Dec 2020 12:44)      SUBJECTIVE / OVERNIGHT EVENTS:  No acute events overnight.  Pt reports feeling well. He denies any further hematemesis, N/V/D. His last BM was this AM, dark stools.     MEDICATIONS  (STANDING):  atorvastatin 20 milliGRAM(s) Oral at bedtime  cholecalciferol 2000 Unit(s) Oral daily  dextrose 40% Gel 15 Gram(s) Oral once  dextrose 5%. 1000 milliLiter(s) (50 mL/Hr) IV Continuous <Continuous>  dextrose 5%. 1000 milliLiter(s) (100 mL/Hr) IV Continuous <Continuous>  dextrose 50% Injectable 25 Gram(s) IV Push once  dextrose 50% Injectable 12.5 Gram(s) IV Push once  dextrose 50% Injectable 25 Gram(s) IV Push once  folic acid 1 milliGRAM(s) Oral daily  glucagon  Injectable 1 milliGRAM(s) IntraMuscular once  insulin lispro (ADMELOG) corrective regimen sliding scale   SubCutaneous three times a day before meals  insulin lispro (ADMELOG) corrective regimen sliding scale   SubCutaneous at bedtime  magnesium hydroxide Suspension 30 milliLiter(s) Oral daily  mycophenolate mofetil 500 milliGRAM(s) Oral two times a day  pantoprazole    Tablet 40 milliGRAM(s) Oral two times a day  predniSONE   Tablet 2.5 milliGRAM(s) Oral two times a day  tacrolimus 1 milliGRAM(s) Oral every 12 hours    MEDICATIONS  (PRN):      CAPILLARY BLOOD GLUCOSE      POCT Blood Glucose.: 218 mg/dL (16 Dec 2020 22:42)  POCT Blood Glucose.: 266 mg/dL (16 Dec 2020 21:09)  POCT Blood Glucose.: 186 mg/dL (16 Dec 2020 17:13)  POCT Blood Glucose.: 147 mg/dL (16 Dec 2020 12:01)  POCT Blood Glucose.: 143 mg/dL (16 Dec 2020 10:37)  POCT Blood Glucose.: 173 mg/dL (16 Dec 2020 09:27)  POCT Blood Glucose.: 177 mg/dL (16 Dec 2020 07:50)    I&O's Summary    16 Dec 2020 07:01  -  17 Dec 2020 06:47  --------------------------------------------------------  IN: 90 mL / OUT: 100 mL / NET: -10 mL        PHYSICAL EXAM:  Vital Signs Last 24 Hrs  T(C): 36.6 (17 Dec 2020 04:19), Max: 37.1 (16 Dec 2020 16:50)  T(F): 97.8 (17 Dec 2020 04:19), Max: 98.8 (16 Dec 2020 16:50)  HR: 85 (17 Dec 2020 04:19) (75 - 92)  BP: 126/68 (17 Dec 2020 04:19) (108/53 - 128/68)  BP(mean): --  RR: 18 (17 Dec 2020 04:19) (16 - 20)  SpO2: 100% (17 Dec 2020 04:19) (97% - 100%)    GEN: Well appearing, in no apparent distress  EYES: PERRL, EOMI, no scleral icterus  HEAD/NECK: Normocephalic, atraumatic, no cervical lymphadenopathy  RESP: no accessory muscle use, B/L air entry, CTAB   CARDIO: regular rate/rhythm, no LE edema B/L  ABD: soft, NT, ND, +BS  PSYCH: A&Ox3, normal affect  SKIN: warm, dry, in tact, no rashes  NEURO: no focal neurologic deficits appreciated  EXT: no lower extremity edema, no cyanosis  VASC: good peripheral pulses    LABS:                        9.7    10.28 )-----------( 83       ( 17 Dec 2020 06:00 )             31.7     12    135  |  102  |  49<H>  ----------------------------<  152<H>  4.2   |  23  |  1.51<H>    Ca    9.2      17 Dec 2020 06:00  Phos  3.1     12  Mg     2.1         TPro  5.9<L>  /  Alb  3.6  /  TBili  0.7  /  DBili  x   /  AST  23  /  ALT  16  /  AlkPhos  61  12    PT/INR - ( 15 Dec 2020 17:39 )   PT: 15.6 sec;   INR: 1.38 ratio         PTT - ( 15 Dec 2020 17:39 )  PTT:31.5 sec  CARDIAC MARKERS ( 15 Dec 2020 19:55 )  x     / x     / 67 U/L / x     / 5.6 ng/mL      Urinalysis Basic - ( 15 Dec 2020 18:54 )    Color: Yellow / Appearance: Clear / S.025 / pH: x  Gluc: x / Ketone: Small  / Bili: Negative / Urobili: <2 mg/dL   Blood: x / Protein: Trace / Nitrite: Negative   Leuk Esterase: Negative / RBC: x / WBC x   Sq Epi: x / Non Sq Epi: x / Bacteria: x          RADIOLOGY & ADDITIONAL TESTS:    < from: Upper Endoscopy (20 @ 09:19) >  Findings:       LA Grade D (one or more mucosal breaks involving at least 75% of        esophageal circumference) esophagitis with no bleeding was found in the        lower third of the esophagus.       One superficial esophageal ulcer with no bleeding and stigmata of recent        bleeding with flat pigmented spot was found at the gastroesophageal        junction.       The Z-line was found 40 cm from the incisors.       A medium-sized hiatal hernia was present. Hill Grade 3.       Scattered mild inflammation characterized by erythema was found in the        gastric body and in the gastric antrum.       The exam of the stomach was otherwise normal.       The examined duodenum up to the 2nd part was normal.                                                                   Impression:          - LA Grade D esophagitis with non-bleeding esophageal                        ulcer.                       - Medium-sized hiatal hernia.                       - Gastritis.                       - Normal examined duodenum.                       - No specimens collected.  Recommendation:      - [facility]                       - Start PPI 40 mg PO BID continue for one month.                      - Advance diet as tolerated.                                                                                     < end of copied text >

## 2020-12-17 NOTE — PROGRESS NOTE ADULT - PROBLEM SELECTOR PLAN 8
Reportedly taking isosorbide 60mg and carvedilol 25mg BID outpatient  BP controlled off meds at present  Will monitor and restart as needed Reportedly taking isosorbide 60mg and carvedilol 25mg BID outpatient  BP controlled off meds at present  Will monitor and restart as needed outpatient Reportedly taking isosorbide 60mg and carvedilol 25mg BID outpatient  -BP controlled off meds at present  -Will monitor and restart as needed outpatient H/o T2DM, reportedly taking lantus 16 units and novolog 4units premeals  CC diet  -lantus at 12 while inpatient.  -Still holding premeal

## 2020-12-17 NOTE — DISCHARGE NOTE PROVIDER - NSDCFUADDAPPT_GEN_ALL_CORE_FT
Please follow up with your transplant nephrologist in [place], [place] about your kidney issues.    Please also follow up with your primary care doctor, Dr. Minal Espinosa Please follow up with your transplant nephrologist in [place], [place] about your kidney issues.

## 2020-12-17 NOTE — PROGRESS NOTE ADULT - PROBLEM SELECTOR PLAN 5
Anemia in this patient is 2/2 blood loss however ferritin is elevated suggesting chronic disease, and B12 is low, suggesting deficiency. Normal MCV may reflect mixed picture of anemia. Homocysteine to be sent to differentiate folate vs B12 def, methylmalonic acid level sent to confirm B12. We will dose 1000 mcg injection today, and can follow up with PCP outpatient for 2 more injections (q week) and further management. Anemia in this patient is 2/2 blood loss however ferritin is elevated suggesting chronic disease, and B12 is low, suggesting deficiency. Normal MCV may reflect mixed picture of anemia. Homocysteine to be sent to differentiate folate vs B12 def, methylmalonic acid level sent to confirm B12.   -s/p 1000 mcg injection 12/16  -can follow up with PCP outpatient for 2 more injections (q week) and further management

## 2020-12-18 ENCOUNTER — TRANSCRIPTION ENCOUNTER (OUTPATIENT)
Age: 65
End: 2020-12-18

## 2020-12-18 VITALS
DIASTOLIC BLOOD PRESSURE: 65 MMHG | TEMPERATURE: 98 F | HEART RATE: 83 BPM | SYSTOLIC BLOOD PRESSURE: 116 MMHG | RESPIRATION RATE: 18 BRPM | OXYGEN SATURATION: 100 %

## 2020-12-18 LAB
D DIMER BLD IA.RAPID-MCNC: 277 NG/ML DDU — HIGH
FIBRINOGEN PPP-MCNC: 591 MG/DL — HIGH (ref 300–520)
HAPTOGLOB SERPL-MCNC: 133 MG/DL — SIGNIFICANT CHANGE UP (ref 34–200)
LDH SERPL L TO P-CCNC: 209 U/L — SIGNIFICANT CHANGE UP (ref 135–225)
PHOSPHATE SERPL-MCNC: 3.2 MG/DL — SIGNIFICANT CHANGE UP (ref 2.5–4.5)
RBC # BLD: 3.24 M/UL — LOW (ref 4.2–5.8)
RETICS #: 115.3 K/UL — SIGNIFICANT CHANGE UP (ref 25–125)
RETICS/RBC NFR: 3.6 % — HIGH (ref 0.5–2.5)
TACROLIMUS SERPL-MCNC: 3.4 NG/ML — SIGNIFICANT CHANGE UP

## 2020-12-18 PROCEDURE — 99239 HOSP IP/OBS DSCHRG MGMT >30: CPT

## 2020-12-18 PROCEDURE — 99223 1ST HOSP IP/OBS HIGH 75: CPT | Mod: GC

## 2020-12-18 RX ORDER — PREGABALIN 225 MG/1
1000 CAPSULE ORAL DAILY
Refills: 0 | Status: DISCONTINUED | OUTPATIENT
Start: 2020-12-18 | End: 2020-12-18

## 2020-12-18 RX ORDER — PREGABALIN 225 MG/1
1 CAPSULE ORAL
Qty: 14 | Refills: 0
Start: 2020-12-18 | End: 2020-12-31

## 2020-12-18 RX ORDER — PANTOPRAZOLE SODIUM 20 MG/1
1 TABLET, DELAYED RELEASE ORAL
Qty: 56 | Refills: 0
Start: 2020-12-18 | End: 2021-01-14

## 2020-12-18 RX ADMIN — TACROLIMUS 1 MILLIGRAM(S): 5 CAPSULE ORAL at 05:05

## 2020-12-18 RX ADMIN — PANTOPRAZOLE SODIUM 40 MILLIGRAM(S): 20 TABLET, DELAYED RELEASE ORAL at 05:05

## 2020-12-18 RX ADMIN — Medication 1: at 08:01

## 2020-12-18 RX ADMIN — MYCOPHENOLATE MOFETIL 1000 MILLIGRAM(S): 250 CAPSULE ORAL at 05:05

## 2020-12-18 RX ADMIN — Medication 2: at 12:38

## 2020-12-18 RX ADMIN — Medication 2.5 MILLIGRAM(S): at 05:05

## 2020-12-18 NOTE — PROGRESS NOTE ADULT - PROBLEM SELECTOR PLAN 5
Anemia in this patient is 2/2 blood loss however ferritin is elevated suggesting chronic disease, and B12 is low, suggesting deficiency. Normal MCV may reflect mixed picture of anemia. Homocysteine to be sent to differentiate folate vs B12 def, methylmalonic acid level sent to confirm B12.   -s/p 1000 mcg injection 12/16  -d/c on oral B12 supplementation

## 2020-12-18 NOTE — PROGRESS NOTE ADULT - ASSESSMENT
66 yo M w/ h/o CAD w/ 1x NATALIE, CABG in 2005, R kidney transplant in 2015 on prednisone, prograf and cellcept, T2DM, HTN presenting for 1 episode of hematemesis.       Impression:  #Hematemesis - likely secondary to esophagitis seen on EGD, also with esophageal ulcer, HH and gastritis; on PPI BID  #CAD s/p CABG  #History of renal transplant, on immunosuppression      Recommendation:  - c/w PPI BID x1 month  - diet as tolerated  - patient can follow up in GI clinic - [id number] [place], [place] ([phone])  - will sign off at this time, please reconsult as needed      Zhanna Orona PGY-4  Gastroenterology Fellow  Pager #[phone] or [phone]  Please page on-call Fellow on weekends/after 5 pm on weekdays   Please call answering service for on-call GI fellow ([phone]) after 5pm and before 8am, and on weekends. 
Pt is a 64 yo M w/ h/o CAD w/ 1x NATALIE, CABG in 2005, R kidney transplant in 2015 on prograf and cellcept, DM, HTN presenting for 1x episode of hematemesis, currently hemodynamically stable,  GI c/s placed, plan for possible EGD tomorrow morning. 
Pt is a 66 yo M w/ h/o CAD w/ 1x NATALIE, CABG in 2005, R kidney transplant in 2015 on prograf and cellcept, DM, HTN presenting for 1x episode of hematemesis, currently hemodynamically stable, s/p EGD which showed gastritis, esophagitis, 1 esophageal ulcer, hiatal hernia.
Pt is a 64 yo M w/ h/o CAD w/ 1x NATALIE, CABG in 2005, R kidney transplant in 2015 on prograf and cellcept, DM, HTN presenting for 1x episode of hematemesis, currently hemodynamically stable, s/p EGD which showed gastritis, esophagitis, 1 esophageal ulcer, hiatal hernia.

## 2020-12-18 NOTE — PROGRESS NOTE ADULT - PROBLEM SELECTOR PLAN 8
H/o T2DM, reportedly taking lantus 16 units and novolog 4units premeals  CC diet  -lantus at 12 while inpatient.  -Still holding premeal

## 2020-12-18 NOTE — CONSULT NOTE ADULT - ATTENDING COMMENTS
Wily Mcfadden is a 65 year old male with a prior history of chronic renal failure that had required dialysis until 15 years ago when he received a renal  allograft. The patient not on dialysis and he has a limited diet with minimal intake of meat. He has alow vitamin B 12 level and the current anemia and mild thrombocytopenia may be multifactorial as noted above note. Patietn should remain on B 12 oral supplimentation and follow up with his renal physicians

## 2020-12-18 NOTE — PROGRESS NOTE ADULT - PROBLEM SELECTOR PLAN 7
Reportedly had MI in the past w/ 1x NATALIE, s/p CABG, on ASA at home  - hold ASA in setting of GI bleed, restart on discharge  - c/w atorvastatin for now

## 2020-12-18 NOTE — PROGRESS NOTE ADULT - PROBLEM SELECTOR PLAN 10
GOC: full code  DVT prophylaxis: SCDS for now  Activity: ambulate as tolerated  Diet: NPO except meds  Fall, Asp precaution  d/c pending CBC stability

## 2020-12-18 NOTE — PROGRESS NOTE ADULT - SUBJECTIVE AND OBJECTIVE BOX
Michael Deutsch M.D.  Internal Medicine PGY-3  Pager: [phone]/ [phone]             Patient is a 65y old  Male who presents with a chief complaint of hematemesis (17 Dec 2020 14:04)    SUBJECTIVE / OVERNIGHT EVENTS: Patient seen and examined. No acute overnight events, denies any hematemesis, melena, BRB, or abdominal complaints.   Tolerating diet.     OBJECTIVE:  Vital Signs Last 24 Hrs  T(C): 36.4 (18 Dec 2020 04:50), Max: 36.9 (17 Dec 2020 16:00)  T(F): 97.6 (18 Dec 2020 04:50), Max: 98.4 (17 Dec 2020 16:00)  HR: 79 (18 Dec 2020 04:50) (79 - 93)  BP: 129/66 (18 Dec 2020 04:50) (106/56 - 130/66)    RR: 18 (18 Dec 2020 04:50) (16 - 18)  SpO2: 100% (18 Dec 2020 04:50) (99% - 100%)    I&O's Summary    17 Dec 2020 07:01  -  18 Dec 2020 07:00  --------------------------------------------------------  IN: 90 mL / OUT: 350 mL / NET: -260 mL      Physical Examination:  GENERAL:  Well developed, no distress  HEENT:  NC/AT,  conjunctivae clear, sclera anicteric  CHEST:  Full & symmetric excursion, no increased effort w/ respirations  HEART:  Regular rhythm & rate  ABDOMEN:  Soft, non-tender, non-distended  EXTREMITIES:  no LE  edema  SKIN:  No rash/erythema/ecchymoses/petechiae/wounds/jaundice  NEURO:  Alert, oriented    Labs:  CAPILLARY BLOOD GLUCOSE      POCT Blood Glucose.: 150 mg/dL (17 Dec 2020 21:09)  POCT Blood Glucose.: 168 mg/dL (17 Dec 2020 16:48)  POCT Blood Glucose.: 254 mg/dL (17 Dec 2020 12:15)  POCT Blood Glucose.: 172 mg/dL (17 Dec 2020 07:39)    CBC Full  -  ( 17 Dec 2020 06:00 )  WBC Count : 10.28 K/uL  RBC Count : 3.40 M/uL  Hemoglobin : 9.7 g/dL  Hematocrit : 31.7 %  Platelet Count - Automated : 83 K/uL  Mean Cell Volume : 93.2 fL  Mean Cell Hemoglobin : 28.5 pg  Mean Cell Hemoglobin Concentration : 30.6 gm/dL  Auto Neutrophil # : 7.22 K/uL  Auto Lymphocyte # : 1.43 K/uL  Auto Monocyte # : 1.25 K/uL  Auto Eosinophil # : 0.28 K/uL  Auto Basophil # : 0.07 K/uL  Auto Neutrophil % : 70.2 %  Auto Lymphocyte % : 13.9 %  Auto Monocyte % : 12.2 %  Auto Eosinophil % : 2.7 %  Auto Basophil % : 0.7 %    12-17    135  |  102  |  49<H>  ----------------------------<  152<H>  4.2   |  23  |  1.51<H>    Ca    9.2      17 Dec 2020 06:00  Phos  3.1     12-17  Mg     2.1     12-17    TPro  5.9<L>  /  Alb  3.6  /  TBili  0.7  /  DBili  x   /  AST  23  /  ALT  16  /  AlkPhos  61  12-17                Imaging Personally Reviewed:    I&O's Summary    17 Dec 2020 07:01  -  18 Dec 2020 07:00  --------------------------------------------------------  IN: 90 mL / OUT: 350 mL / NET: -260 mL      Consultant(s) Notes Reviewed:   Care Discussed with Consultants/Other Providers:    MEDICATIONS  (STANDING):  atorvastatin 20 milliGRAM(s) Oral at bedtime  cholecalciferol 2000 Unit(s) Oral daily  dextrose 40% Gel 15 Gram(s) Oral once  dextrose 5%. 1000 milliLiter(s) (50 mL/Hr) IV Continuous <Continuous>  dextrose 5%. 1000 milliLiter(s) (100 mL/Hr) IV Continuous <Continuous>  dextrose 50% Injectable 25 Gram(s) IV Push once  dextrose 50% Injectable 12.5 Gram(s) IV Push once  dextrose 50% Injectable 25 Gram(s) IV Push once  folic acid 1 milliGRAM(s) Oral daily  glucagon  Injectable 1 milliGRAM(s) IntraMuscular once  insulin lispro (ADMELOG) corrective regimen sliding scale   SubCutaneous three times a day before meals  insulin lispro (ADMELOG) corrective regimen sliding scale   SubCutaneous at bedtime  magnesium hydroxide Suspension 30 milliLiter(s) Oral daily  mycophenolate mofetil 1000 milliGRAM(s) Oral two times a day  pantoprazole    Tablet 40 milliGRAM(s) Oral two times a day  predniSONE   Tablet 2.5 milliGRAM(s) Oral two times a day  tacrolimus 1 milliGRAM(s) Oral every 12 hours    MEDICATIONS  (PRN):

## 2020-12-18 NOTE — DIETITIAN INITIAL EVALUATION ADULT. - OTHER INFO
Pt 66 yo male with PMHx of CAD w/ 1x NATALIE, CABG in 2005, R kidney transplant in 2015, DM, HTN presented for 1x episode of hematemesis; s/p EGD -> gastritis, esophagitis, 1 esophageal ulcer, hiatal hernia; Plan to c/w PPI BID x1 month, diet as tolerated - per chart review.     At time of visit Pt appears alert, oriented. Per Pt his appetite not that well for years; Pt also stated he lost weight over the course of years since he had his "heart surgery". Food preferences discussed; will recommend to add PO supplement: Glucerna Shake - 1x daily to diet rx. No report of chewing or swallowing difficulty; no report of nausea, vomiting or diarrhea @ this time. +BM (12/18) - per flow sheet.    Of note Pt's HbA1c level 7.1% (12/16). At home Pt tries to avoid salt and regular sugar reported. Better food options discussed with Pt. RDN offered written materials on therapeutic diet, but Pt declined. No other food related concerns voiced @ present. RDN remains available, Pt made aware.

## 2020-12-18 NOTE — CONSULT NOTE ADULT - ASSESSMENT
64 yo M w/ h/o CAD w/ 1x NATALIE, CABG in 2005, R kidney transplant in 2015 on prednisone, prograf and cellcept, T2DM, HTN presented for one episode of hemetemesis, hyperkalemia.  Heme consulted for anemia and thrombocytopenia.    #Anemia and Thrombocytopenia  - hg 11.3 on admission, has drifted to 9.7 which may be multifactorial.  s/p EGD which showed esophagitis.  - plt on admission 133   - iron studies consistent with AOCD  - B12 is low, patient has started supplementation  - f/u MMA  - recheck coags, check LDH, haptoglobin, fibrinogen and D-Dimer  - will check peripheral smear  - if possible, obtain outpatient CBC     Lashell Georges DO  Hematology/Oncology Fellow, PGY6  Pager: [phone] 66 yo M w/ h/o CAD w/ 1x NATALIE, CABG in 2005, R kidney transplant in 2015 on prednisone, prograf and cellcept, T2DM, HTN presented for one episode of hemetemesis, hyperkalemia.  Heme consulted for anemia and thrombocytopenia.    #Anemia and Thrombocytopenia  - hg 11.3 on admission, has drifted to 9.7 which may be multifactorial.  s/p EGD which showed esophagitis.  - plt on admission 133   - iron studies consistent with AOCD  - B12 is low, patient has started supplementation  - f/u MMA  - recheck coags, check LDH, haptoglobin, fibrinogen and D-Dimer  - peripheral smear: smear made with old blood, will need to be repeated as outpatient.  - primary team has discharged patient, will refer to [facility] for follow up.    Lashell Georges DO  Hematology/Oncology Fellow, PGY6  Pager: [phone]

## 2020-12-18 NOTE — DISCHARGE NOTE NURSING/CASE MANAGEMENT/SOCIAL WORK - PATIENT PORTAL LINK FT
You can access the FollowMyHealth Patient Portal offered by [facility] by registering at the following website: [email or web address]. By joining [facility]’s FollowMyHealth portal, you will also be able to view your health information using other applications (apps) compatible with our system.

## 2020-12-18 NOTE — DIETITIAN INITIAL EVALUATION ADULT. - PERTINENT LABORATORY DATA
(12/17) H/H 9.7/31.7 L, PLT 83 L, BUN 49 H, Creat 1.51 H, Glu 152 H, Albumin 3.6  (12/16) HbA1c 7.1% H, HDL 31 L

## 2020-12-18 NOTE — CONSULT NOTE ADULT - SUBJECTIVE AND OBJECTIVE BOX
66 yo M w/ h/o CAD w/ 1x NATALIE, CABG in 2005, R kidney transplant in 2015 on prednisone, prograf and cellcept, T2DM, HTN presented for one episode of hemetemesis, hyperkalemia.  Heme consulted for anemia and thrombocytopenia.    Per chart, patient had one episode of bloody emesis on morning of admission. Pt reports that he got both of his CABG and kidney transplant in [place], [place] (no records in HIE or allscripts). Pt otherwise is complaining of mild lower back pain. Otherwise, denies fevers, chills, chest p/p, sob, diarrhea, sick contacts, recent travel.     Since admission, GI and nephro had been consulted.  EGD showed esophagitis.  Nephro on board as well, no signs of rejection.    Admission CBC showed hg 11.3 MCV 91.5 and plt of 133.  Today hg 9.7 and plt 83.  Blue top plt count 107. Coags on admission PT 15.6 with INR 1.38, normal PTT.  Iron studies consistent with AOCD.  Folate normal, B12 low. Homocysteine elevated, MMA pending.     PAST MEDICAL & SURGICAL HISTORY:  High cholesterol    HTN (hypertension)    DM (diabetes mellitus)  Type 2    CAD (coronary artery disease)    S/P kidney transplant    S/P triple vessel bypass    MEDICATIONS  (STANDING):  atorvastatin 20 milliGRAM(s) Oral at bedtime  cholecalciferol 2000 Unit(s) Oral daily  dextrose 40% Gel 15 Gram(s) Oral once  dextrose 5%. 1000 milliLiter(s) (50 mL/Hr) IV Continuous <Continuous>  dextrose 5%. 1000 milliLiter(s) (100 mL/Hr) IV Continuous <Continuous>  dextrose 50% Injectable 25 Gram(s) IV Push once  dextrose 50% Injectable 12.5 Gram(s) IV Push once  dextrose 50% Injectable 25 Gram(s) IV Push once  folic acid 1 milliGRAM(s) Oral daily  glucagon  Injectable 1 milliGRAM(s) IntraMuscular once  insulin lispro (ADMELOG) corrective regimen sliding scale   SubCutaneous three times a day before meals  insulin lispro (ADMELOG) corrective regimen sliding scale   SubCutaneous at bedtime  magnesium hydroxide Suspension 30 milliLiter(s) Oral daily  mycophenolate mofetil 1000 milliGRAM(s) Oral two times a day  pantoprazole    Tablet 40 milliGRAM(s) Oral two times a day  predniSONE   Tablet 2.5 milliGRAM(s) Oral two times a day  tacrolimus 1 milliGRAM(s) Oral every 12 hours    MEDICATIONS  (PRN):      Allergies    No Known Allergies    Intolerances        SOCIAL HISTORY:    FAMILY HISTORY:  FH: HTN (hypertension)        Vital Signs Last 24 Hrs  T(C): 36.4 (18 Dec 2020 04:50), Max: 36.9 (17 Dec 2020 16:00)  T(F): 97.6 (18 Dec 2020 04:50), Max: 98.4 (17 Dec 2020 16:00)  HR: 79 (18 Dec 2020 04:50) (79 - 93)  BP: 129/66 (18 Dec 2020 04:50) (118/66 - 130/66)  BP(mean): --  RR: 18 (18 Dec 2020 04:50) (16 - 18)  SpO2: 100% (18 Dec 2020 04:50) (99% - 100%)    PHYSICAL EXAM:    GENERAL: NAD, AAOx3   HEAD:  NC/AT  EYES: EOMI, PERRLA, no scleral icterus  HEENT: Moist mucous membranes  LUNG: Clear to auscultation bilaterally; No rales, rhonchi, wheezing, or rubs  HEART: RRR; No murmurs, rubs, or gallops  ABDOMEN: +BS, ST/ND/NT  EXTREMITIES:  2+ Peripheral Pulses, No clubbing, cyanosis, or edema  LAD: no palpable adenopathy    LABS:                        9.7    10.28 )-----------( 83       ( 17 Dec 2020 06:00 )             31.7     12-17    135  |  102  |  49<H>  ----------------------------<  152<H>  4.2   |  23  |  1.51<H>    Ca    9.2      17 Dec 2020 06:00  Phos  3.2     12-18  Mg     2.1     12-17    TPro  5.9<L>  /  Alb  3.6  /  TBili  0.7  /  DBili  x   /  AST  23  /  ALT  16  /  AlkPhos  61  12-17 64 yo M w/ h/o CAD w/ 1x NATALIE, CABG in 2005, R kidney transplant in 2015 on prednisone, prograf and cellcept, T2DM, HTN presented for one episode of hemetemesis, hyperkalemia.  Heme consulted for anemia and thrombocytopenia.    Per chart, patient had one episode of bloody emesis on morning of admission. Pt reports that he got both of his CABG and kidney transplant in [place], [place] (no records in HIE or allscripts). Pt otherwise is complaining of mild lower back pain. Otherwise, denies fevers, chills, chest p/p, sob, diarrhea, sick contacts, recent travel.     Since admission, GI and nephro had been consulted.  EGD showed esophagitis.  Nephro on board as well, no signs of rejection.    Admission CBC showed hg 11.3 MCV 91.5 and plt of 133.  Today hg 9.7 and plt 83.  Blue top plt count 107. Coags on admission PT 15.6 with INR 1.38, normal PTT.  Iron studies consistent with AOCD.  Folate normal, B12 low. Homocysteine elevated, MMA pending.     PAST MEDICAL & SURGICAL HISTORY:  High cholesterol    HTN (hypertension)    DM (diabetes mellitus)  Type 2    CAD (coronary artery disease)    S/P kidney transplant    S/P triple vessel bypass    MEDICATIONS  (STANDING):  atorvastatin 20 milliGRAM(s) Oral at bedtime  cholecalciferol 2000 Unit(s) Oral daily  dextrose 40% Gel 15 Gram(s) Oral once  dextrose 5%. 1000 milliLiter(s) (50 mL/Hr) IV Continuous <Continuous>  dextrose 5%. 1000 milliLiter(s) (100 mL/Hr) IV Continuous <Continuous>  dextrose 50% Injectable 25 Gram(s) IV Push once  dextrose 50% Injectable 12.5 Gram(s) IV Push once  dextrose 50% Injectable 25 Gram(s) IV Push once  folic acid 1 milliGRAM(s) Oral daily  glucagon  Injectable 1 milliGRAM(s) IntraMuscular once  insulin lispro (ADMELOG) corrective regimen sliding scale   SubCutaneous three times a day before meals  insulin lispro (ADMELOG) corrective regimen sliding scale   SubCutaneous at bedtime  magnesium hydroxide Suspension 30 milliLiter(s) Oral daily  mycophenolate mofetil 1000 milliGRAM(s) Oral two times a day  pantoprazole    Tablet 40 milliGRAM(s) Oral two times a day  predniSONE   Tablet 2.5 milliGRAM(s) Oral two times a day  tacrolimus 1 milliGRAM(s) Oral every 12 hours    MEDICATIONS  (PRN):      Allergies    No Known Allergies    Intolerances        SOCIAL HISTORY:    FAMILY HISTORY:  FH: HTN (hypertension)        Vital Signs Last 24 Hrs  T(C): 36.4 (18 Dec 2020 04:50), Max: 36.9 (17 Dec 2020 16:00)  T(F): 97.6 (18 Dec 2020 04:50), Max: 98.4 (17 Dec 2020 16:00)  HR: 79 (18 Dec 2020 04:50) (79 - 93)  BP: 129/66 (18 Dec 2020 04:50) (118/66 - 130/66)  BP(mean): --  RR: 18 (18 Dec 2020 04:50) (16 - 18)  SpO2: 100% (18 Dec 2020 04:50) (99% - 100%)    PHYSICAL EXAM:    GENERAL: NAD, AAOx3   HEAD:  NC/AT  EYES: EOMI, PERRLA, no scleral icterus  HEENT: Moist mucous membranes  LUNG: Clear to auscultation bilaterally; No rales, rhonchi, wheezing, or rubs  HEART: RRR; No murmurs, rubs, or gallops  ABDOMEN: +distended, + fluid shift  EXTREMITIES:  2+ Peripheral Pulses, No clubbing, cyanosis, or edema  LAD: no palpable adenopathy    LABS:                        9.7    10.28 )-----------( 83       ( 17 Dec 2020 06:00 )             31.7     12-17    135  |  102  |  49<H>  ----------------------------<  152<H>  4.2   |  23  |  1.51<H>    Ca    9.2      17 Dec 2020 06:00  Phos  3.2     12-18  Mg     2.1     12-17    TPro  5.9<L>  /  Alb  3.6  /  TBili  0.7  /  DBili  x   /  AST  23  /  ALT  16  /  AlkPhos  61  12-17

## 2020-12-18 NOTE — DIETITIAN INITIAL EVALUATION ADULT. - DIET TYPE
Glucerna Therapeutic Nutrition 8oz. 1x daily (~220kcals, ~10g protein)/DASH/TLC (sodium and cholesterol restricted diet)/consistent carbohydrate (evening snack)/supplement (specify)

## 2020-12-18 NOTE — PROGRESS NOTE ADULT - PROBLEM SELECTOR PLAN 3
acute kidney injury  in the setting of GI bleed. Baseline serum creatinine unknown however now improving from admission.  UA shows trace proteinuria, spot urine TP/Cr ratio WNL (0.2). Urine electrolytes consistent with pre-renal IZZY. Pt. with ? hemodynamically mediated non-oliguric IZZY. Recommend 1L IV @ 75 ml/hour while pt. NPO.   Avoid potential nephrotoxins. Dose medications as per eGFR.  -US Transplant Kidney with doppler study - can do outpatient  continue with all antirejection meds, prograf 1 mg BID, prenisone 2.5 mg BID, cellcept 1 g BID.  Check tacrolimus level today

## 2020-12-18 NOTE — DISCHARGE NOTE NURSING/CASE MANAGEMENT/SOCIAL WORK - NSDCFUADDAPPT_GEN_ALL_CORE_FT
Please follow up with your transplant nephrologist in [place], [place] about your kidney issues.

## 2020-12-18 NOTE — DIETITIAN INITIAL EVALUATION ADULT. - ADD RECOMMEND
1. Encourage & assist Pt with meals; Monitor PO diet tolerance; Honor food preferences;        2. Monitor labs, hydration status;       3. Will recommend to follow up with appropriate RDN upon discharge for the purposes of long-term nutrition evaluation and diet education;

## 2020-12-18 NOTE — PROGRESS NOTE ADULT - ATTENDING COMMENTS
Patient seen and examined on 12/17. Case discussed with the medical team on rounds. I agree with the findings and the plan above.    Wily Mcfadden is a 65 year old gentlemen with h/o CAD s/p CABG and kidney transplant 4-5 yrs ago on Prograf daily p/w hematemesis x1  and back pain. No further episodes of hematemesis in ED. On admission found to be hyperkalemic to 6.2, Lokelma and cocktail administered. No EKG changes. US kidney with no evidence of rejection.   GI and nephrology consulted    Hb drop noted 2/2 to possible upper GI bleed, hb has been stable  patient s/p EGD w/evidence of esophagitis  Blood work reveals low B12 levels, patient states that he does eat meat  Methylmalonic acid level pending  Patient now optimized for discharge if no objection from consulting providers , however requires close outpatient follow up with GI, PCP and transplant nephrologist. He also may require a referral to hematology for workup regarding thrombocytopenia. D/W patient extensively, he was able to verbalize the plan. Patient seen and examined on 12/17. Case discussed with the medical team on rounds. I agree with the findings and the plan above.    Wily Mcfadden is a 65 year old gentlemen with h/o CAD s/p CABG and kidney transplant 4-5 yrs ago on Prograf daily p/w hematemesis x1  and back pain. No further episodes of hematemesis in ED. On admission found to be hyperkalemic to 6.2, Lokelma and cocktail administered. No EKG changes. US kidney with no evidence of rejection.   GI and nephrology consulted    Hb drop noted 2/2 to possible upper GI bleed, hb has been stable  patient s/p EGD w/evidence of esophagitis  Blood work reveals low B12 levels, patient states that he does eat meat  Methylmalonic acid level pending  Patient now optimized for discharge if no objection from consulting providers , however requires close outpatient follow up with GI, PCP and transplant nephrologist. Hematology input for thrombocytopenia. D/W patient extensively, he was able to verbalize the plan.

## 2020-12-18 NOTE — PROGRESS NOTE ADULT - PROBLEM SELECTOR PLAN 1
2/2 esophagitis  s/p EGD  Appreciate GI recs  - c/w PPI BID x1 month  - diet as tolerated  - patient can follow up in GI clinic - [id number] [place], [place] ([phone])- in d/c.

## 2020-12-18 NOTE — PROGRESS NOTE ADULT - PROBLEM SELECTOR PLAN 9
Reportedly taking isosorbide 60mg and carvedilol 25mg BID outpatient  -BP controlled off meds at present  -Will monitor and restart as needed outpatient

## 2020-12-20 LAB — PCA AB SER-ACNC: SIGNIFICANT CHANGE UP

## 2020-12-21 PROBLEM — E78.00 PURE HYPERCHOLESTEROLEMIA, UNSPECIFIED: Chronic | Status: ACTIVE | Noted: 2020-12-16

## 2020-12-21 PROBLEM — E11.9 TYPE 2 DIABETES MELLITUS WITHOUT COMPLICATIONS: Chronic | Status: ACTIVE | Noted: 2020-12-16

## 2020-12-21 PROBLEM — I10 ESSENTIAL (PRIMARY) HYPERTENSION: Chronic | Status: ACTIVE | Noted: 2020-12-16

## 2020-12-21 PROBLEM — I25.10 ATHEROSCLEROTIC HEART DISEASE OF NATIVE CORONARY ARTERY WITHOUT ANGINA PECTORIS: Chronic | Status: ACTIVE | Noted: 2020-12-15

## 2020-12-23 LAB — METHYLMALONATE SERPL-SCNC: 978 NMOL/L — HIGH (ref 0–378)

## 2020-12-28 PROBLEM — Z00.00 ENCOUNTER FOR PREVENTIVE HEALTH EXAMINATION: Status: ACTIVE | Noted: 2020-12-28

## 2021-01-21 ENCOUNTER — OUTPATIENT (OUTPATIENT)
Dept: OUTPATIENT SERVICES | Facility: HOSPITAL | Age: 66
LOS: 1 days | Discharge: HOME | End: 2021-01-21

## 2021-01-21 DIAGNOSIS — Z95.1 PRESENCE OF AORTOCORONARY BYPASS GRAFT: Chronic | ICD-10-CM

## 2021-01-21 DIAGNOSIS — D64.9 ANEMIA, UNSPECIFIED: ICD-10-CM

## 2021-01-21 DIAGNOSIS — D69.6 THROMBOCYTOPENIA, UNSPECIFIED: ICD-10-CM

## 2021-01-21 DIAGNOSIS — Z94.0 KIDNEY TRANSPLANT STATUS: Chronic | ICD-10-CM

## 2021-01-25 ENCOUNTER — APPOINTMENT (OUTPATIENT)
Dept: HEMATOLOGY ONCOLOGY | Facility: CLINIC | Age: 66
End: 2021-01-25

## 2021-01-25 RX ORDER — BLOOD SUGAR DIAGNOSTIC
STRIP MISCELLANEOUS
Qty: 100 | Refills: 0 | Status: ACTIVE | COMMUNITY
Start: 2021-01-18

## 2021-01-25 RX ORDER — INSULIN GLARGINE 100 [IU]/ML
100 INJECTION, SOLUTION SUBCUTANEOUS
Qty: 6 | Refills: 0 | Status: ACTIVE | COMMUNITY
Start: 2020-11-20

## 2021-01-25 RX ORDER — METFORMIN HYDROCHLORIDE 500 MG/1
500 TABLET, COATED ORAL
Qty: 90 | Refills: 0 | Status: ACTIVE | COMMUNITY
Start: 2021-01-23

## 2021-01-25 RX ORDER — PREDNISONE 2.5 MG/1
2.5 TABLET ORAL
Qty: 90 | Refills: 0 | Status: ACTIVE | COMMUNITY
Start: 2020-11-11

## 2021-01-25 RX ORDER — MYCOPHENOLATE MOFETIL 500 MG/1
500 TABLET ORAL
Qty: 360 | Refills: 0 | Status: ACTIVE | COMMUNITY
Start: 2020-12-31

## 2021-01-25 RX ORDER — INSULIN ASPART 100 [IU]/ML
100 INJECTION, SOLUTION INTRAVENOUS; SUBCUTANEOUS
Qty: 6 | Refills: 0 | Status: ACTIVE | COMMUNITY
Start: 2020-10-02

## 2021-01-25 RX ORDER — ISOSORBIDE MONONITRATE 60 MG/1
60 TABLET, EXTENDED RELEASE ORAL
Qty: 30 | Refills: 0 | Status: ACTIVE | COMMUNITY
Start: 2021-01-19

## 2021-01-25 RX ORDER — ATORVASTATIN CALCIUM 20 MG/1
20 TABLET, FILM COATED ORAL
Qty: 90 | Refills: 0 | Status: ACTIVE | COMMUNITY
Start: 2021-01-16

## 2021-01-25 RX ORDER — CARVEDILOL 25 MG/1
25 TABLET, FILM COATED ORAL
Qty: 180 | Refills: 0 | Status: ACTIVE | COMMUNITY
Start: 2020-11-11

## 2021-01-25 RX ORDER — TACROLIMUS 0.5 MG/1
0.5 CAPSULE ORAL
Qty: 60 | Refills: 0 | Status: ACTIVE | COMMUNITY
Start: 2020-10-06

## 2021-01-25 RX ORDER — PANTOPRAZOLE 40 MG/1
40 TABLET, DELAYED RELEASE ORAL
Qty: 58 | Refills: 0 | Status: ACTIVE | COMMUNITY
Start: 2020-12-17

## 2021-01-25 RX ORDER — TACROLIMUS 1 MG/1
1 CAPSULE ORAL
Qty: 60 | Refills: 0 | Status: ACTIVE | COMMUNITY
Start: 2020-12-08

## 2021-01-25 NOTE — HISTORY OF PRESENT ILLNESS
[de-identified] : Pt is a 66 yo M w/ h/o CAD w/ 1x NATALIE, CABG in 2005, R kidney transplant in 2015 \par on prednisone, prograf and cellcept, T2DM, HTN admitted on 12/17/20 to [facility]  with 1x episode of \par hematemesis. He was seen by the GI team and endoscopy was done, showing \par esophagitis, gastritis, 1 esophageal ulcer and hiatal hernia. Hematemesis \par likely secondary to esophagitis seen on EGD. Pt started on pantoprazole 40mg \par BID (total course 1 month). Pt also seen by nephrology. Unknown baseline \par creatinine, 1.5 on discharge. Pt instructed to follow up with outpatient \par transplant kidney nephrologist in [place], [place]. Pt's blood pressure \par controlled off BP medications, will have pt follow up with outpatient PCP to \par restart carvedilol 25mg BID and isosorbide 60mg daily. Pt found to have mixed \par picture normocytic anemia likely from anemia of chronic disease (high ferritin) \par and B12 deficiency. Pt given 1 dose 1000mcg B12 on 12/16, will have pt follow \par up with outpatient PCP to have levels rechecked after supplementation. He was \par found to have thrombocytopenia as well, and will follow up with hematology at \par the [facility]. \par This is his first visit since he was discharged.

## 2021-02-25 ENCOUNTER — OUTPATIENT (OUTPATIENT)
Dept: OUTPATIENT SERVICES | Facility: HOSPITAL | Age: 66
LOS: 1 days | Discharge: HOME | End: 2021-02-25

## 2021-02-25 DIAGNOSIS — D64.9 ANEMIA, UNSPECIFIED: ICD-10-CM

## 2021-02-25 DIAGNOSIS — Z95.1 PRESENCE OF AORTOCORONARY BYPASS GRAFT: Chronic | ICD-10-CM

## 2021-02-25 DIAGNOSIS — Z94.0 KIDNEY TRANSPLANT STATUS: Chronic | ICD-10-CM

## 2021-02-25 DIAGNOSIS — D69.6 THROMBOCYTOPENIA, UNSPECIFIED: ICD-10-CM

## 2021-03-01 ENCOUNTER — APPOINTMENT (OUTPATIENT)
Dept: HEMATOLOGY ONCOLOGY | Facility: CLINIC | Age: 66
End: 2021-03-01

## 2021-03-01 DIAGNOSIS — E53.8 DEFICIENCY OF OTHER SPECIFIED B GROUP VITAMINS: ICD-10-CM

## 2021-03-01 DIAGNOSIS — D69.6 THROMBOCYTOPENIA, UNSPECIFIED: ICD-10-CM

## 2021-03-01 NOTE — REASON FOR VISIT
[Initial Consultation] : an initial consultation for [FreeTextEntry2] : B 12 deficiency/ thrombocytopenia

## 2021-03-01 NOTE — HISTORY OF PRESENT ILLNESS
[0 - No Distress] : Distress Level: 0 [de-identified] : Pt is a 66 yo M w/ h/o CAD w/ 1x NATALIE, CABG in 2005, R kidney transplant in 2015 \par on prednisone, prograf and cellcept, T2DM, HTN presenting for 1x episode of \par hematemesis. He was seen by the GI team and endoscopy was done, showing \par esophagitis, gastritis, 1 esophageal ulcer and hiatal hernia. Hematemesis \par likely secondary to esophagitis seen on EGD. Pt started on pantoprazole 40mg \par BID (total course 1 month). Pt also seen by nephrology. Unknown baseline \par creatinine, 1.5 on discharge. Pt instructed to follow up with outpatient \par transplant kidney nephrologist in [place], [place]. Pt's blood pressure \par controlled off BP medications, will have pt follow up with outpatient PCP to \par restart carvedilol 25mg BID and isosorbide 60mg daily. Pt found to have mixed \par picture normocytic anemia likely from anemia of chronic disease (high ferritin) \par and B12 deficiency. Pt given 1 dose 1000mcg B12 on 12/16, will have pt follow \par up with outpatient PCP to have levels rechecked after supplementation. He was \par found to have thrombocytopenia as well, and will follow up with hematology at \par the [facility]. \par This is the first follow up visit.

## 2021-04-30 NOTE — H&P ADULT - PROBLEM/PLAN-8
Patient states she was told to take Potassium BID, but pt states she received Potassium tablets from pharmacy, and states directions state to tablets QID. Also, pt states tablets look different.    Pt requesting call back at [phone]   DISPLAY PLAN FREE TEXT

## 2023-01-09 ENCOUNTER — INPATIENT (INPATIENT)
Facility: HOSPITAL | Age: 68
LOS: 6 days | Discharge: HOME HEALTH | End: 2023-01-16
Attending: HOSPITALIST | Admitting: HOSPITALIST
Payer: MEDICARE

## 2023-01-09 VITALS
RESPIRATION RATE: 24 BRPM | TEMPERATURE: 98 F | DIASTOLIC BLOOD PRESSURE: 65 MMHG | OXYGEN SATURATION: 96 % | SYSTOLIC BLOOD PRESSURE: 121 MMHG | HEART RATE: 89 BPM

## 2023-01-09 DIAGNOSIS — Z29.9 ENCOUNTER FOR PROPHYLACTIC MEASURES, UNSPECIFIED: ICD-10-CM

## 2023-01-09 DIAGNOSIS — R79.89 OTHER SPECIFIED ABNORMAL FINDINGS OF BLOOD CHEMISTRY: ICD-10-CM

## 2023-01-09 DIAGNOSIS — J18.9 PNEUMONIA, UNSPECIFIED ORGANISM: ICD-10-CM

## 2023-01-09 DIAGNOSIS — I25.10 ATHEROSCLEROTIC HEART DISEASE OF NATIVE CORONARY ARTERY WITHOUT ANGINA PECTORIS: ICD-10-CM

## 2023-01-09 DIAGNOSIS — Z94.0 KIDNEY TRANSPLANT STATUS: ICD-10-CM

## 2023-01-09 DIAGNOSIS — Z94.0 KIDNEY TRANSPLANT STATUS: Chronic | ICD-10-CM

## 2023-01-09 DIAGNOSIS — I10 ESSENTIAL (PRIMARY) HYPERTENSION: ICD-10-CM

## 2023-01-09 DIAGNOSIS — E11.9 TYPE 2 DIABETES MELLITUS WITHOUT COMPLICATIONS: ICD-10-CM

## 2023-01-09 DIAGNOSIS — E78.5 HYPERLIPIDEMIA, UNSPECIFIED: ICD-10-CM

## 2023-01-09 DIAGNOSIS — J11.00 INFLUENZA DUE TO UNIDENTIFIED INFLUENZA VIRUS WITH UNSPECIFIED TYPE OF PNEUMONIA: ICD-10-CM

## 2023-01-09 DIAGNOSIS — E11.65 TYPE 2 DIABETES MELLITUS WITH HYPERGLYCEMIA: ICD-10-CM

## 2023-01-09 DIAGNOSIS — Z95.1 PRESENCE OF AORTOCORONARY BYPASS GRAFT: Chronic | ICD-10-CM

## 2023-01-09 DIAGNOSIS — Z79.899 OTHER LONG TERM (CURRENT) DRUG THERAPY: ICD-10-CM

## 2023-01-09 DIAGNOSIS — R73.9 HYPERGLYCEMIA, UNSPECIFIED: ICD-10-CM

## 2023-01-09 LAB
A1C WITH ESTIMATED AVERAGE GLUCOSE RESULT: 7.7 % — HIGH (ref 4–5.6)
ACANTHOCYTES BLD QL SMEAR: SLIGHT — SIGNIFICANT CHANGE UP
ALBUMIN SERPL ELPH-MCNC: 3.2 G/DL — LOW (ref 3.3–5)
ALBUMIN SERPL ELPH-MCNC: 3.4 G/DL — SIGNIFICANT CHANGE UP (ref 3.3–5)
ALP SERPL-CCNC: 159 U/L — HIGH (ref 40–120)
ALP SERPL-CCNC: 173 U/L — HIGH (ref 40–120)
ALT FLD-CCNC: 233 U/L — HIGH (ref 4–41)
ALT FLD-CCNC: 289 U/L — HIGH (ref 4–41)
ANION GAP SERPL CALC-SCNC: 13 MMOL/L — SIGNIFICANT CHANGE UP (ref 7–14)
ANION GAP SERPL CALC-SCNC: 15 MMOL/L — HIGH (ref 7–14)
ANION GAP SERPL CALC-SCNC: 17 MMOL/L — HIGH (ref 7–14)
ANISOCYTOSIS BLD QL: SLIGHT — SIGNIFICANT CHANGE UP
AST SERPL-CCNC: 129 U/L — HIGH (ref 4–40)
AST SERPL-CCNC: 229 U/L — HIGH (ref 4–40)
B PERT DNA SPEC QL NAA+PROBE: SIGNIFICANT CHANGE UP
B PERT+PARAPERT DNA PNL SPEC NAA+PROBE: SIGNIFICANT CHANGE UP
B-OH-BUTYR SERPL-SCNC: 1.2 MMOL/L — HIGH (ref 0–0.4)
B-OH-BUTYR SERPL-SCNC: <0 MMOL/L — SIGNIFICANT CHANGE UP (ref 0–0.4)
BASE EXCESS BLDV CALC-SCNC: -3.6 MMOL/L — LOW (ref -2–3)
BASE EXCESS BLDV CALC-SCNC: -5.7 MMOL/L — LOW (ref -2–3)
BASOPHILS # BLD AUTO: 0 K/UL — SIGNIFICANT CHANGE UP (ref 0–0.2)
BASOPHILS NFR BLD AUTO: 0 % — SIGNIFICANT CHANGE UP (ref 0–2)
BILIRUB SERPL-MCNC: 1.7 MG/DL — HIGH (ref 0.2–1.2)
BILIRUB SERPL-MCNC: 2 MG/DL — HIGH (ref 0.2–1.2)
BLOOD GAS VENOUS COMPREHENSIVE RESULT: SIGNIFICANT CHANGE UP
BORDETELLA PARAPERTUSSIS (RAPRVP): SIGNIFICANT CHANGE UP
BUN SERPL-MCNC: 27 MG/DL — HIGH (ref 7–23)
BUN SERPL-MCNC: 28 MG/DL — HIGH (ref 7–23)
BUN SERPL-MCNC: 29 MG/DL — HIGH (ref 7–23)
C PNEUM DNA SPEC QL NAA+PROBE: SIGNIFICANT CHANGE UP
CA-I SERPL-SCNC: 1.17 MMOL/L — SIGNIFICANT CHANGE UP (ref 1.15–1.33)
CALCIUM SERPL-MCNC: 8.8 MG/DL — SIGNIFICANT CHANGE UP (ref 8.4–10.5)
CALCIUM SERPL-MCNC: 8.9 MG/DL — SIGNIFICANT CHANGE UP (ref 8.4–10.5)
CALCIUM SERPL-MCNC: 9.1 MG/DL — SIGNIFICANT CHANGE UP (ref 8.4–10.5)
CHLORIDE BLDV-SCNC: 99 MMOL/L — SIGNIFICANT CHANGE UP (ref 96–108)
CHLORIDE BLDV-SCNC: 99 MMOL/L — SIGNIFICANT CHANGE UP (ref 96–108)
CHLORIDE SERPL-SCNC: 101 MMOL/L — SIGNIFICANT CHANGE UP (ref 98–107)
CHLORIDE SERPL-SCNC: 95 MMOL/L — LOW (ref 98–107)
CHLORIDE SERPL-SCNC: 98 MMOL/L — SIGNIFICANT CHANGE UP (ref 98–107)
CO2 BLDV-SCNC: 20.5 MMOL/L — LOW (ref 22–26)
CO2 BLDV-SCNC: 22.7 MMOL/L — SIGNIFICANT CHANGE UP (ref 22–26)
CO2 SERPL-SCNC: 18 MMOL/L — LOW (ref 22–31)
CO2 SERPL-SCNC: 21 MMOL/L — LOW (ref 22–31)
CO2 SERPL-SCNC: 22 MMOL/L — SIGNIFICANT CHANGE UP (ref 22–31)
CREAT SERPL-MCNC: 1.41 MG/DL — HIGH (ref 0.5–1.3)
CREAT SERPL-MCNC: 1.59 MG/DL — HIGH (ref 0.5–1.3)
CREAT SERPL-MCNC: 1.72 MG/DL — HIGH (ref 0.5–1.3)
D DIMER BLD IA.RAPID-MCNC: 1950 NG/ML DDU — HIGH
EGFR: 42 ML/MIN/1.73M2 — LOW
EGFR: 47 ML/MIN/1.73M2 — LOW
EGFR: 54 ML/MIN/1.73M2 — LOW
EOSINOPHIL # BLD AUTO: 0 K/UL — SIGNIFICANT CHANGE UP (ref 0–0.5)
EOSINOPHIL NFR BLD AUTO: 0 % — SIGNIFICANT CHANGE UP (ref 0–6)
ESTIMATED AVERAGE GLUCOSE: 174 — SIGNIFICANT CHANGE UP
FLUAV H3 RNA SPEC QL NAA+PROBE: DETECTED
FLUBV RNA SPEC QL NAA+PROBE: SIGNIFICANT CHANGE UP
GAS PNL BLDV: 128 MMOL/L — LOW (ref 136–145)
GAS PNL BLDV: 130 MMOL/L — LOW (ref 136–145)
GAS PNL BLDV: SIGNIFICANT CHANGE UP
GLUCOSE BLDV-MCNC: 325 MG/DL — HIGH (ref 70–99)
GLUCOSE BLDV-MCNC: 368 MG/DL — HIGH (ref 70–99)
GLUCOSE SERPL-MCNC: 298 MG/DL — HIGH (ref 70–99)
GLUCOSE SERPL-MCNC: 317 MG/DL — HIGH (ref 70–99)
GLUCOSE SERPL-MCNC: 413 MG/DL — HIGH (ref 70–99)
HADV DNA SPEC QL NAA+PROBE: SIGNIFICANT CHANGE UP
HAV IGM SER-ACNC: SIGNIFICANT CHANGE UP
HBV CORE IGM SER-ACNC: SIGNIFICANT CHANGE UP
HBV SURFACE AG SER-ACNC: SIGNIFICANT CHANGE UP
HCO3 BLDV-SCNC: 19 MMOL/L — LOW (ref 22–29)
HCO3 BLDV-SCNC: 22 MMOL/L — SIGNIFICANT CHANGE UP (ref 22–29)
HCOV 229E RNA SPEC QL NAA+PROBE: SIGNIFICANT CHANGE UP
HCOV HKU1 RNA SPEC QL NAA+PROBE: SIGNIFICANT CHANGE UP
HCOV NL63 RNA SPEC QL NAA+PROBE: SIGNIFICANT CHANGE UP
HCOV OC43 RNA SPEC QL NAA+PROBE: SIGNIFICANT CHANGE UP
HCT VFR BLD CALC: 36.6 % — LOW (ref 39–50)
HCT VFR BLD CALC: 37.5 % — LOW (ref 39–50)
HCT VFR BLDA CALC: 36 % — LOW (ref 39–51)
HCT VFR BLDA CALC: 36 % — LOW (ref 39–51)
HCV AB S/CO SERPL IA: 0.17 S/CO — SIGNIFICANT CHANGE UP (ref 0–0.99)
HCV AB SERPL-IMP: SIGNIFICANT CHANGE UP
HGB BLD CALC-MCNC: 12.1 G/DL — LOW (ref 13–17)
HGB BLD CALC-MCNC: 12.1 G/DL — LOW (ref 13–17)
HGB BLD-MCNC: 11.7 G/DL — LOW (ref 13–17)
HGB BLD-MCNC: 12.2 G/DL — LOW (ref 13–17)
HMPV RNA SPEC QL NAA+PROBE: SIGNIFICANT CHANGE UP
HPIV1 RNA SPEC QL NAA+PROBE: SIGNIFICANT CHANGE UP
HPIV2 RNA SPEC QL NAA+PROBE: SIGNIFICANT CHANGE UP
HPIV3 RNA SPEC QL NAA+PROBE: SIGNIFICANT CHANGE UP
HPIV4 RNA SPEC QL NAA+PROBE: SIGNIFICANT CHANGE UP
IANC: 12.35 K/UL — HIGH (ref 1.8–7.4)
LACTATE BLDV-MCNC: 3.4 MMOL/L — HIGH (ref 0.5–2)
LACTATE BLDV-MCNC: 6 MMOL/L — CRITICAL HIGH (ref 0.5–2)
LACTATE SERPL-SCNC: 2.7 MMOL/L — HIGH (ref 0.5–2)
LACTATE SERPL-SCNC: 3.2 MMOL/L — HIGH (ref 0.5–2)
LYMPHOCYTES # BLD AUTO: 0.4 K/UL — LOW (ref 1–3.3)
LYMPHOCYTES # BLD AUTO: 2.7 % — LOW (ref 13–44)
M PNEUMO DNA SPEC QL NAA+PROBE: SIGNIFICANT CHANGE UP
MAGNESIUM SERPL-MCNC: 1.8 MG/DL — SIGNIFICANT CHANGE UP (ref 1.6–2.6)
MAGNESIUM SERPL-MCNC: 1.9 MG/DL — SIGNIFICANT CHANGE UP (ref 1.6–2.6)
MCHC RBC-ENTMCNC: 27.5 PG — SIGNIFICANT CHANGE UP (ref 27–34)
MCHC RBC-ENTMCNC: 28 PG — SIGNIFICANT CHANGE UP (ref 27–34)
MCHC RBC-ENTMCNC: 32 GM/DL — SIGNIFICANT CHANGE UP (ref 32–36)
MCHC RBC-ENTMCNC: 32.5 GM/DL — SIGNIFICANT CHANGE UP (ref 32–36)
MCV RBC AUTO: 85.9 FL — SIGNIFICANT CHANGE UP (ref 80–100)
MCV RBC AUTO: 86 FL — SIGNIFICANT CHANGE UP (ref 80–100)
MONOCYTES # BLD AUTO: 0.54 K/UL — SIGNIFICANT CHANGE UP (ref 0–0.9)
MONOCYTES NFR BLD AUTO: 3.6 % — SIGNIFICANT CHANGE UP (ref 2–14)
NEUTROPHILS # BLD AUTO: 14.01 K/UL — HIGH (ref 1.8–7.4)
NEUTROPHILS NFR BLD AUTO: 84.8 % — HIGH (ref 43–77)
NEUTS BAND # BLD: 8.9 % — HIGH (ref 0–6)
NRBC # BLD: 0 /100 WBCS — SIGNIFICANT CHANGE UP (ref 0–0)
NRBC # FLD: 0 K/UL — SIGNIFICANT CHANGE UP (ref 0–0)
NT-PROBNP SERPL-SCNC: 6238 PG/ML — HIGH
OSMOLALITY SERPL: 302 MOSMOL/KG — HIGH (ref 280–301)
OVALOCYTES BLD QL SMEAR: SLIGHT — SIGNIFICANT CHANGE UP
PCO2 BLDV: 36 MMHG — LOW (ref 42–55)
PCO2 BLDV: 38 MMHG — LOW (ref 42–55)
PH BLDV: 7.34 — SIGNIFICANT CHANGE UP (ref 7.32–7.43)
PH BLDV: 7.36 — SIGNIFICANT CHANGE UP (ref 7.32–7.43)
PHOSPHATE SERPL-MCNC: 2.2 MG/DL — LOW (ref 2.5–4.5)
PHOSPHATE SERPL-MCNC: 2.9 MG/DL — SIGNIFICANT CHANGE UP (ref 2.5–4.5)
PLAT MORPH BLD: NORMAL — SIGNIFICANT CHANGE UP
PLATELET # BLD AUTO: 169 K/UL — SIGNIFICANT CHANGE UP (ref 150–400)
PLATELET # BLD AUTO: 173 K/UL — SIGNIFICANT CHANGE UP (ref 150–400)
PLATELET COUNT - ESTIMATE: NORMAL — SIGNIFICANT CHANGE UP
PO2 BLDV: 39 MMHG — SIGNIFICANT CHANGE UP
PO2 BLDV: 39 MMHG — SIGNIFICANT CHANGE UP
POIKILOCYTOSIS BLD QL AUTO: SLIGHT — SIGNIFICANT CHANGE UP
POTASSIUM BLDV-SCNC: 4.7 MMOL/L — SIGNIFICANT CHANGE UP (ref 3.5–5.1)
POTASSIUM BLDV-SCNC: 5.3 MMOL/L — HIGH (ref 3.5–5.1)
POTASSIUM SERPL-MCNC: 4.2 MMOL/L — SIGNIFICANT CHANGE UP (ref 3.5–5.3)
POTASSIUM SERPL-MCNC: 4.6 MMOL/L — SIGNIFICANT CHANGE UP (ref 3.5–5.3)
POTASSIUM SERPL-MCNC: 4.7 MMOL/L — SIGNIFICANT CHANGE UP (ref 3.5–5.3)
POTASSIUM SERPL-SCNC: 4.2 MMOL/L — SIGNIFICANT CHANGE UP (ref 3.5–5.3)
POTASSIUM SERPL-SCNC: 4.6 MMOL/L — SIGNIFICANT CHANGE UP (ref 3.5–5.3)
POTASSIUM SERPL-SCNC: 4.7 MMOL/L — SIGNIFICANT CHANGE UP (ref 3.5–5.3)
PROCALCITONIN SERPL-MCNC: 2.09 NG/ML — HIGH (ref 0.02–0.1)
PROT SERPL-MCNC: 6.4 G/DL — SIGNIFICANT CHANGE UP (ref 6–8.3)
PROT SERPL-MCNC: 6.6 G/DL — SIGNIFICANT CHANGE UP (ref 6–8.3)
RAPID RVP RESULT: DETECTED
RBC # BLD: 4.26 M/UL — SIGNIFICANT CHANGE UP (ref 4.2–5.8)
RBC # BLD: 4.36 M/UL — SIGNIFICANT CHANGE UP (ref 4.2–5.8)
RBC # FLD: 14.9 % — HIGH (ref 10.3–14.5)
RBC # FLD: 15.4 % — HIGH (ref 10.3–14.5)
RBC BLD AUTO: ABNORMAL
RSV RNA SPEC QL NAA+PROBE: SIGNIFICANT CHANGE UP
RV+EV RNA SPEC QL NAA+PROBE: SIGNIFICANT CHANGE UP
SAO2 % BLDV: 62.3 % — SIGNIFICANT CHANGE UP
SAO2 % BLDV: 65 % — SIGNIFICANT CHANGE UP
SARS-COV-2 RNA SPEC QL NAA+PROBE: SIGNIFICANT CHANGE UP
SODIUM SERPL-SCNC: 130 MMOL/L — LOW (ref 135–145)
SODIUM SERPL-SCNC: 134 MMOL/L — LOW (ref 135–145)
SODIUM SERPL-SCNC: 136 MMOL/L — SIGNIFICANT CHANGE UP (ref 135–145)
TSH SERPL-MCNC: 1.42 UIU/ML — SIGNIFICANT CHANGE UP (ref 0.27–4.2)
WBC # BLD: 14.95 K/UL — HIGH (ref 3.8–10.5)
WBC # BLD: 17.35 K/UL — HIGH (ref 3.8–10.5)
WBC # FLD AUTO: 14.95 K/UL — HIGH (ref 3.8–10.5)
WBC # FLD AUTO: 17.35 K/UL — HIGH (ref 3.8–10.5)

## 2023-01-09 PROCEDURE — 93970 EXTREMITY STUDY: CPT | Mod: 26

## 2023-01-09 PROCEDURE — 71046 X-RAY EXAM CHEST 2 VIEWS: CPT | Mod: 26

## 2023-01-09 PROCEDURE — 99291 CRITICAL CARE FIRST HOUR: CPT

## 2023-01-09 PROCEDURE — 99053 MED SERV 10PM-8AM 24 HR FAC: CPT

## 2023-01-09 PROCEDURE — 76770 US EXAM ABDO BACK WALL COMP: CPT | Mod: 26,59

## 2023-01-09 PROCEDURE — 99222 1ST HOSP IP/OBS MODERATE 55: CPT

## 2023-01-09 PROCEDURE — 76705 ECHO EXAM OF ABDOMEN: CPT | Mod: 26

## 2023-01-09 PROCEDURE — 99223 1ST HOSP IP/OBS HIGH 75: CPT

## 2023-01-09 RX ORDER — MYCOPHENOLATE MOFETIL 250 MG/1
1000 CAPSULE ORAL
Refills: 0 | Status: DISCONTINUED | OUTPATIENT
Start: 2023-01-09 | End: 2023-01-10

## 2023-01-09 RX ORDER — SODIUM CHLORIDE 9 MG/ML
1000 INJECTION, SOLUTION INTRAVENOUS
Refills: 0 | Status: DISCONTINUED | OUTPATIENT
Start: 2023-01-09 | End: 2023-01-16

## 2023-01-09 RX ORDER — TACROLIMUS 5 MG/1
1 CAPSULE ORAL
Qty: 0 | Refills: 0 | DISCHARGE

## 2023-01-09 RX ORDER — INSULIN LISPRO 100/ML
5 VIAL (ML) SUBCUTANEOUS
Refills: 0 | Status: DISCONTINUED | OUTPATIENT
Start: 2023-01-09 | End: 2023-01-09

## 2023-01-09 RX ORDER — INSULIN ASPART 100 [IU]/ML
4 INJECTION, SOLUTION SUBCUTANEOUS
Qty: 0 | Refills: 0 | DISCHARGE

## 2023-01-09 RX ORDER — INSULIN GLARGINE 100 [IU]/ML
12 INJECTION, SOLUTION SUBCUTANEOUS EVERY MORNING
Refills: 0 | Status: DISCONTINUED | OUTPATIENT
Start: 2023-01-09 | End: 2023-01-09

## 2023-01-09 RX ORDER — SODIUM CHLORIDE 9 MG/ML
1000 INJECTION INTRAMUSCULAR; INTRAVENOUS; SUBCUTANEOUS ONCE
Refills: 0 | Status: COMPLETED | OUTPATIENT
Start: 2023-01-09 | End: 2023-01-09

## 2023-01-09 RX ORDER — AZITHROMYCIN 500 MG/1
500 TABLET, FILM COATED ORAL ONCE
Refills: 0 | Status: COMPLETED | OUTPATIENT
Start: 2023-01-09 | End: 2023-01-09

## 2023-01-09 RX ORDER — ACETAMINOPHEN 500 MG
650 TABLET ORAL EVERY 6 HOURS
Refills: 0 | Status: DISCONTINUED | OUTPATIENT
Start: 2023-01-09 | End: 2023-01-16

## 2023-01-09 RX ORDER — ISOSORBIDE MONONITRATE 60 MG/1
30 TABLET, EXTENDED RELEASE ORAL DAILY
Refills: 0 | Status: DISCONTINUED | OUTPATIENT
Start: 2023-01-09 | End: 2023-01-16

## 2023-01-09 RX ORDER — FOLIC ACID 0.8 MG
1 TABLET ORAL DAILY
Refills: 0 | Status: DISCONTINUED | OUTPATIENT
Start: 2023-01-09 | End: 2023-01-16

## 2023-01-09 RX ORDER — INSULIN LISPRO 100/ML
VIAL (ML) SUBCUTANEOUS EVERY 6 HOURS
Refills: 0 | Status: DISCONTINUED | OUTPATIENT
Start: 2023-01-09 | End: 2023-01-09

## 2023-01-09 RX ORDER — ALBUTEROL 90 UG/1
2.5 AEROSOL, METERED ORAL
Refills: 0 | Status: COMPLETED | OUTPATIENT
Start: 2023-01-09 | End: 2023-01-09

## 2023-01-09 RX ORDER — INSULIN LISPRO 100/ML
VIAL (ML) SUBCUTANEOUS
Refills: 0 | Status: DISCONTINUED | OUTPATIENT
Start: 2023-01-09 | End: 2023-01-09

## 2023-01-09 RX ORDER — DEXTROSE 50 % IN WATER 50 %
25 SYRINGE (ML) INTRAVENOUS ONCE
Refills: 0 | Status: DISCONTINUED | OUTPATIENT
Start: 2023-01-09 | End: 2023-01-16

## 2023-01-09 RX ORDER — FOLIC ACID 0.8 MG
1 TABLET ORAL
Qty: 0 | Refills: 0 | DISCHARGE

## 2023-01-09 RX ORDER — CEFTRIAXONE 500 MG/1
1000 INJECTION, POWDER, FOR SOLUTION INTRAMUSCULAR; INTRAVENOUS EVERY 24 HOURS
Refills: 0 | Status: COMPLETED | OUTPATIENT
Start: 2023-01-09 | End: 2023-01-13

## 2023-01-09 RX ORDER — CHOLECALCIFEROL (VITAMIN D3) 125 MCG
1000 CAPSULE ORAL DAILY
Refills: 0 | Status: DISCONTINUED | OUTPATIENT
Start: 2023-01-09 | End: 2023-01-16

## 2023-01-09 RX ORDER — CARVEDILOL PHOSPHATE 80 MG/1
25 CAPSULE, EXTENDED RELEASE ORAL EVERY 12 HOURS
Refills: 0 | Status: DISCONTINUED | OUTPATIENT
Start: 2023-01-09 | End: 2023-01-16

## 2023-01-09 RX ORDER — DEXTROSE 50 % IN WATER 50 %
12.5 SYRINGE (ML) INTRAVENOUS ONCE
Refills: 0 | Status: DISCONTINUED | OUTPATIENT
Start: 2023-01-09 | End: 2023-01-16

## 2023-01-09 RX ORDER — INSULIN LISPRO 100/ML
VIAL (ML) SUBCUTANEOUS
Refills: 0 | Status: DISCONTINUED | OUTPATIENT
Start: 2023-01-09 | End: 2023-01-16

## 2023-01-09 RX ORDER — MAGNESIUM HYDROXIDE 400 MG/1
1 TABLET, CHEWABLE ORAL
Qty: 0 | Refills: 0 | DISCHARGE

## 2023-01-09 RX ORDER — MYCOPHENOLATE MOFETIL 250 MG/1
2 CAPSULE ORAL
Qty: 0 | Refills: 0 | DISCHARGE

## 2023-01-09 RX ORDER — CARVEDILOL PHOSPHATE 80 MG/1
1 CAPSULE, EXTENDED RELEASE ORAL
Qty: 0 | Refills: 0 | DISCHARGE

## 2023-01-09 RX ORDER — INSULIN LISPRO 100/ML
VIAL (ML) SUBCUTANEOUS AT BEDTIME
Refills: 0 | Status: DISCONTINUED | OUTPATIENT
Start: 2023-01-09 | End: 2023-01-09

## 2023-01-09 RX ORDER — TACROLIMUS 5 MG/1
1 CAPSULE ORAL DAILY
Refills: 0 | Status: DISCONTINUED | OUTPATIENT
Start: 2023-01-09 | End: 2023-01-16

## 2023-01-09 RX ORDER — CEFTRIAXONE 500 MG/1
1000 INJECTION, POWDER, FOR SOLUTION INTRAMUSCULAR; INTRAVENOUS ONCE
Refills: 0 | Status: COMPLETED | OUTPATIENT
Start: 2023-01-09 | End: 2023-01-09

## 2023-01-09 RX ORDER — HEPARIN SODIUM 5000 [USP'U]/ML
5000 INJECTION INTRAVENOUS; SUBCUTANEOUS EVERY 12 HOURS
Refills: 0 | Status: DISCONTINUED | OUTPATIENT
Start: 2023-01-09 | End: 2023-01-16

## 2023-01-09 RX ORDER — METFORMIN HYDROCHLORIDE 850 MG/1
500 TABLET ORAL
Qty: 0 | Refills: 0 | DISCHARGE

## 2023-01-09 RX ORDER — INSULIN LISPRO 100/ML
VIAL (ML) SUBCUTANEOUS AT BEDTIME
Refills: 0 | Status: DISCONTINUED | OUTPATIENT
Start: 2023-01-09 | End: 2023-01-16

## 2023-01-09 RX ORDER — ALOGLIPTIN 12.5 MG/1
1 TABLET, FILM COATED ORAL
Qty: 0 | Refills: 0 | DISCHARGE

## 2023-01-09 RX ORDER — CHOLECALCIFEROL (VITAMIN D3) 125 MCG
1 CAPSULE ORAL
Qty: 0 | Refills: 0 | DISCHARGE

## 2023-01-09 RX ORDER — ISOSORBIDE MONONITRATE 60 MG/1
1 TABLET, EXTENDED RELEASE ORAL
Qty: 0 | Refills: 0 | DISCHARGE

## 2023-01-09 RX ORDER — INSULIN GLARGINE 100 [IU]/ML
16 INJECTION, SOLUTION SUBCUTANEOUS
Qty: 0 | Refills: 0 | DISCHARGE

## 2023-01-09 RX ORDER — IPRATROPIUM/ALBUTEROL SULFATE 18-103MCG
3 AEROSOL WITH ADAPTER (GRAM) INHALATION
Refills: 0 | Status: COMPLETED | OUTPATIENT
Start: 2023-01-09 | End: 2023-01-09

## 2023-01-09 RX ORDER — TACROLIMUS 5 MG/1
0.5 CAPSULE ORAL AT BEDTIME
Refills: 0 | Status: DISCONTINUED | OUTPATIENT
Start: 2023-01-09 | End: 2023-01-16

## 2023-01-09 RX ORDER — ATORVASTATIN CALCIUM 80 MG/1
1 TABLET, FILM COATED ORAL
Qty: 0 | Refills: 0 | DISCHARGE

## 2023-01-09 RX ORDER — INSULIN ASPART 100 [IU]/ML
6 INJECTION, SOLUTION SUBCUTANEOUS
Qty: 0 | Refills: 0 | DISCHARGE

## 2023-01-09 RX ORDER — INSULIN GLARGINE 100 [IU]/ML
14 INJECTION, SOLUTION SUBCUTANEOUS
Refills: 0 | Status: DISCONTINUED | OUTPATIENT
Start: 2023-01-10 | End: 2023-01-10

## 2023-01-09 RX ORDER — INSULIN LISPRO 100/ML
6 VIAL (ML) SUBCUTANEOUS
Refills: 0 | Status: DISCONTINUED | OUTPATIENT
Start: 2023-01-09 | End: 2023-01-11

## 2023-01-09 RX ORDER — METFORMIN HYDROCHLORIDE 850 MG/1
1 TABLET ORAL
Qty: 0 | Refills: 0 | DISCHARGE

## 2023-01-09 RX ORDER — ATORVASTATIN CALCIUM 80 MG/1
20 TABLET, FILM COATED ORAL AT BEDTIME
Refills: 0 | Status: DISCONTINUED | OUTPATIENT
Start: 2023-01-09 | End: 2023-01-16

## 2023-01-09 RX ORDER — GLUCAGON INJECTION, SOLUTION 0.5 MG/.1ML
1 INJECTION, SOLUTION SUBCUTANEOUS ONCE
Refills: 0 | Status: DISCONTINUED | OUTPATIENT
Start: 2023-01-09 | End: 2023-01-16

## 2023-01-09 RX ORDER — DEXTROSE 50 % IN WATER 50 %
15 SYRINGE (ML) INTRAVENOUS ONCE
Refills: 0 | Status: DISCONTINUED | OUTPATIENT
Start: 2023-01-09 | End: 2023-01-16

## 2023-01-09 RX ORDER — ASPIRIN/CALCIUM CARB/MAGNESIUM 324 MG
1 TABLET ORAL
Qty: 0 | Refills: 0 | DISCHARGE

## 2023-01-09 RX ORDER — AZITHROMYCIN 500 MG/1
500 TABLET, FILM COATED ORAL EVERY 24 HOURS
Refills: 0 | Status: DISCONTINUED | OUTPATIENT
Start: 2023-01-10 | End: 2023-01-13

## 2023-01-09 RX ORDER — ASPIRIN/CALCIUM CARB/MAGNESIUM 324 MG
81 TABLET ORAL DAILY
Refills: 0 | Status: DISCONTINUED | OUTPATIENT
Start: 2023-01-09 | End: 2023-01-16

## 2023-01-09 RX ORDER — INSULIN GLARGINE 100 [IU]/ML
12 INJECTION, SOLUTION SUBCUTANEOUS
Qty: 0 | Refills: 0 | DISCHARGE

## 2023-01-09 RX ADMIN — ALBUTEROL 2.5 MILLIGRAM(S): 90 AEROSOL, METERED ORAL at 03:48

## 2023-01-09 RX ADMIN — Medication 125 MILLIGRAM(S): at 01:15

## 2023-01-09 RX ADMIN — Medication 3 MILLILITER(S): at 02:27

## 2023-01-09 RX ADMIN — Medication 1 MILLIGRAM(S): at 11:57

## 2023-01-09 RX ADMIN — Medication 81 MILLIGRAM(S): at 11:56

## 2023-01-09 RX ADMIN — Medication 30 MILLIGRAM(S): at 22:38

## 2023-01-09 RX ADMIN — Medication 75 MILLIGRAM(S): at 11:57

## 2023-01-09 RX ADMIN — Medication 5 UNIT(S): at 17:18

## 2023-01-09 RX ADMIN — AZITHROMYCIN 255 MILLIGRAM(S): 500 TABLET, FILM COATED ORAL at 03:37

## 2023-01-09 RX ADMIN — Medication 3: at 17:18

## 2023-01-09 RX ADMIN — Medication 3 MILLILITER(S): at 01:31

## 2023-01-09 RX ADMIN — HEPARIN SODIUM 5000 UNIT(S): 5000 INJECTION INTRAVENOUS; SUBCUTANEOUS at 17:08

## 2023-01-09 RX ADMIN — Medication 1000 UNIT(S): at 11:56

## 2023-01-09 RX ADMIN — ATORVASTATIN CALCIUM 20 MILLIGRAM(S): 80 TABLET, FILM COATED ORAL at 22:38

## 2023-01-09 RX ADMIN — CEFTRIAXONE 100 MILLIGRAM(S): 500 INJECTION, POWDER, FOR SOLUTION INTRAMUSCULAR; INTRAVENOUS at 03:26

## 2023-01-09 RX ADMIN — TACROLIMUS 0.5 MILLIGRAM(S): 5 CAPSULE ORAL at 22:38

## 2023-01-09 RX ADMIN — TACROLIMUS 1 MILLIGRAM(S): 5 CAPSULE ORAL at 17:08

## 2023-01-09 RX ADMIN — ALBUTEROL 2.5 MILLIGRAM(S): 90 AEROSOL, METERED ORAL at 03:28

## 2023-01-09 RX ADMIN — INSULIN GLARGINE 12 UNIT(S): 100 INJECTION, SOLUTION SUBCUTANEOUS at 11:50

## 2023-01-09 RX ADMIN — CARVEDILOL PHOSPHATE 25 MILLIGRAM(S): 80 CAPSULE, EXTENDED RELEASE ORAL at 17:08

## 2023-01-09 RX ADMIN — ALBUTEROL 2.5 MILLIGRAM(S): 90 AEROSOL, METERED ORAL at 03:08

## 2023-01-09 RX ADMIN — Medication 4: at 22:39

## 2023-01-09 RX ADMIN — MYCOPHENOLATE MOFETIL 1000 MILLIGRAM(S): 250 CAPSULE ORAL at 18:23

## 2023-01-09 RX ADMIN — Medication 4: at 13:41

## 2023-01-09 RX ADMIN — SODIUM CHLORIDE 1000 MILLILITER(S): 9 INJECTION INTRAMUSCULAR; INTRAVENOUS; SUBCUTANEOUS at 02:56

## 2023-01-09 RX ADMIN — ISOSORBIDE MONONITRATE 30 MILLIGRAM(S): 60 TABLET, EXTENDED RELEASE ORAL at 11:57

## 2023-01-09 RX ADMIN — Medication 3 MILLILITER(S): at 01:32

## 2023-01-09 NOTE — ED PROVIDER NOTE - OBJECTIVE STATEMENT
57 yo M w/ h/o CAD w/ 1x NATALIE, CABG in 2005, R kidney transplant in 2015 on prednisone, prograf and cellcept, T2DM, HTN, p/w SOB. onset 1d prior, a/w cough. all family members have flu. pt denies fever, cp, n/v, abd pain, diarrhea, dysuria, b/l LE edema. pt arrives with increased WOB, initially O2 88% on RA, improved 96% on 4L NC.

## 2023-01-09 NOTE — H&P ADULT - PROBLEM SELECTOR PLAN 3
-hx CAD with stent x1, denies chest pain  -c/w asa/statin, coreg, imdur  -monitor bp  -check TTE (pro-bnp elevated to 6238),   although clinically doesn't appear to be in chf, no peripheral edema, no pulmonary congestion on cxr; pt also denies hx chf

## 2023-01-09 NOTE — ED ADULT NURSE NOTE - NSIMPLEMENTINTERV_GEN_ALL_ED
Implemented All Fall Risk Interventions:  [place] to call system. Call bell, personal items and telephone within reach. Instruct patient to call for assistance. Room bathroom lighting operational. Non-slip footwear when patient is off stretcher. Physically safe environment: no spills, clutter or unnecessary equipment. Stretcher in lowest position, wheels locked, appropriate side rails in place. Provide visual cue, wrist band, yellow gown, etc. Monitor gait and stability. Monitor for mental status changes and reorient to person, place, and time. Review medications for side effects contributing to fall risk. Reinforce activity limits and safety measures with patient and family.

## 2023-01-09 NOTE — ED PROVIDER NOTE - CARE PLAN
1 Principal Discharge DX:	Pneumonia  Secondary Diagnosis:	Reactive airway disease   Principal Discharge DX:	Acute respiratory failure with hypoxia  Secondary Diagnosis:	Septic shock  Secondary Diagnosis:	Reactive airway disease  Secondary Diagnosis:	Pneumonia

## 2023-01-09 NOTE — H&P ADULT - PROBLEM SELECTOR PLAN 6
-cont 80% basal bolus insulin  adjust prn  ISS, monitor FS  check A1c -cont 80% basal bolus insulin  adjust prn  ISS, monitor FS  A1c 7.7%  I emailed endocrine consult, f/u recs

## 2023-01-09 NOTE — ED ADULT NURSE REASSESSMENT NOTE - NS ED NURSE REASSESS COMMENT FT1
BReak RN: pt received back from XR, on 4L NC en route. Patient presents 88% on 4L O2 , NSR on cardiac monitor. Patient belly breathing, respirations labored. sating 92% on 6LNC. MD Colón and MD goodman made aware. to be placed on bipap. respiratory called
BReak RN: pt respirations even, unlabored. continuing duoneb treatment at this time. No complaints of chest pain, headache, nausea, dizziness, vomiting  SOB, fever, chills verbalized. 20GIV in place, safety measures in place.

## 2023-01-09 NOTE — H&P ADULT - NSHPREVIEWOFSYSTEMS_GEN_ALL_CORE
CONSTITUTIONAL: No fever, weight loss, + fatigue  EYES: No eye pain, visual disturbances, or discharge  ENMT:  No difficulty hearing, tinnitus, vertigo; No sinus or throat pain  NECK: No pain or stiffness  BREASTS: No pain, masses, or nipple discharge  RESPIRATORY: + cough, wheezing, no chills or hemoptysis; + shortness of breath  CARDIOVASCULAR: No chest pain, palpitations, dizziness, or leg swelling  GASTROINTESTINAL: No abdominal or epigastric pain. No nausea, vomiting, or hematemesis; No diarrhea or constipation. No melena or hematochezia.  GENITOURINARY: No dysuria, frequency, hematuria, or incontinence  NEUROLOGICAL: No headaches, memory loss, loss of strength, numbness, or tremors  SKIN: No itching, burning, rashes, or lesions   LYMPH NODES: No enlarged glands  ENDOCRINE: No heat or cold intolerance; No hair loss  MUSCULOSKELETAL: No muscle or back pain  PSYCHIATRIC: No depression, anxiety, mood swings, or difficulty sleeping  HEME/LYMPH: No easy bruising, or bleeding gums  ALLERGY AND IMMUNOLOGIC: No hives or eczema

## 2023-01-09 NOTE — CONSULT NOTE ADULT - PROBLEM SELECTOR RECOMMENDATION 2
Pt. home medication regimen is Prograf 1 mg in AM and 0.5 mg in the evening, Prednisone2.5mg BID, and Cellcept 1 gm BID. Recommend to resume home medications. Check Prograf level tomorrow AM (30 minutes prior to AM dose). Monitor labs.    If you have any questions, please feel free to contact me  Martin Peterson  Nephrology Fellow  [phone]/ [employer] Teams(Preferred)  (After 5pm or on weekends please page the on-call fellow).

## 2023-01-09 NOTE — CONSULT NOTE ADULT - SUBJECTIVE AND OBJECTIVE BOX
HPI:  66 yo male with h/o HTN, transplant DM, HLD, CAD w/ 1x NATALIE, CABG in 2005, R kidney transplant in  at [facility] on prednisone, prograf and cellcept, presents to ED with 2 day hx of SOB, a/w cough, +sick contact with family members have flu, denies chest pain/fever/chill. In ED, he was hypoxic to 88% on RA with increased WOB, improved to 96% on 4L NC. He was put on BIPAP for increased WOB, evaluated and rejected by ICU, BIPAP adjusted down to 10/5 and 30% per ICU recs. Pt denies hx of CHF. Lab showed WBC 14, Hgb 11.7, Cr 1.7, pro-BNP 6238, transaminitis ast/alt 229/289. ,   (2023 09:52)    Endocrine consulted for transplant DM with steroid-induced hyperglycemia. He had some mild DKA initially but this resolved quickly.    Received methylprednisolone 125mg x1 early morning on . Will thereafter be on 2.5mg prednisone daily, which is his home dose.    DM diagnosis: Transplant DM, diagnosed after transplant around  or   Last A1c: 7.7  Endocrinologist: None  Home DM meds: Per documentation, patient is on Lantus 12 units qhs, Novolog 6 units TIDac, metformin 500mg daily and alogliptin 25mg. Patient states he thinks he takes Lantus 6 units and Novolog 16 units though he admits he is unsure as his wife helps him with his meds. He seems to believe he takes metformin but not alogliptin.  Microvascular complications: None  Macrovascular complications: + CAD  SMBx/day. Fasting 140s, pre-lunch 130s. No lows.  Diet at home: Appetite poor with the flu  Appetite in the hospital: Has not eaten anything yet  ROS positive for back pain.      PAST MEDICAL & SURGICAL HISTORY:  CAD (coronary artery disease)      DM (diabetes mellitus)      HTN (hypertension)      High cholesterol      S/P triple vessel bypass      S/P kidney transplant          FAMILY HISTORY:  FH: HTN (hypertension)    Father with DM    Social History: no tobacco or etoh use    Outpatient Medications: Home Medications:  Alogliptin 25 mg oral tablet: 1 tab(s) orally once a day (2023 10:07)  aspirin 81 mg oral tablet: 1 tab(s) orally once a day (2023 10:07)  atorvastatin 20 mg oral tablet: 1 tab(s) orally once a day (2023 10:07)  carvedilol 25 mg oral tablet: 1 tab(s) orally 2 times a day (2023 10:07)  CellCept 500 mg oral tablet: 2 tab(s) orally 2 times a day (2023 10:07)  folic acid 1 mg oral tablet: 1 tab(s) orally once a day (2023 10:07)  isosorbide mononitrate 30 mg oral tablet, extended release: 1 tab(s) orally once a day (in the morning) (2023 10:07)  Lantus 100 units/mL subcutaneous solution: 12 unit(s) subcutaneous once a day (2023 10:07)  magnesium hydroxide 400 mg oral tablet, chewable: 1 tab(s) orally 2 times a day (2023 10:07)  metFORMIN 500 mg oral tablet, extended release: 500 tab(s) orally once a day (at bedtime) (2023 10:07)  NovoLOG 100 units/mL subcutaneous solution: 6 unit(s) subcutaneous 3 times a day (before meals) (2023 10:07)  predniSONE 2.5 mg oral tablet: 1 tab(s) orally once a day (2023 10:07)  Prograf 0.5 mg oral capsule: 1 cap(s) orally once a day (at bedtime) (2023 10:07)  Prograf 1 mg oral capsule: 1 cap(s) orally once a day (2023 10:07)  Vitamin D3 2000 intl units (50 mcg) oral tablet: 1 tab(s) orally once a day (2023 10:07)      MEDICATIONS  (STANDING):  aspirin  chewable 81 milliGRAM(s) Oral daily  atorvastatin 20 milliGRAM(s) Oral at bedtime  azithromycin  IVPB 500 milliGRAM(s) IV Intermittent every 24 hours  carvedilol 25 milliGRAM(s) Oral every 12 hours  cefTRIAXone   IVPB 1000 milliGRAM(s) IV Intermittent every 24 hours  cholecalciferol 1000 Unit(s) Oral daily  dextrose 5%. 1000 milliLiter(s) (100 mL/Hr) IV Continuous <Continuous>  dextrose 5%. 1000 milliLiter(s) (50 mL/Hr) IV Continuous <Continuous>  dextrose 50% Injectable 25 Gram(s) IV Push once  dextrose 50% Injectable 12.5 Gram(s) IV Push once  dextrose 50% Injectable 25 Gram(s) IV Push once  folic acid 1 milliGRAM(s) Oral daily  glucagon  Injectable 1 milliGRAM(s) IntraMuscular once  heparin   Injectable 5000 Unit(s) SubCutaneous every 12 hours  insulin glargine Injectable (LANTUS) 12 Unit(s) SubCutaneous every morning  insulin lispro (ADMELOG) corrective regimen sliding scale   SubCutaneous three times a day before meals  insulin lispro (ADMELOG) corrective regimen sliding scale   SubCutaneous at bedtime  insulin lispro Injectable (ADMELOG) 5 Unit(s) SubCutaneous three times a day before meals  isosorbide   mononitrate ER Tablet (IMDUR) 30 milliGRAM(s) Oral daily  mycophenolate mofetil 1000 milliGRAM(s) Oral two times a day  oseltamivir 30 milliGRAM(s) Oral once  predniSONE   Tablet 2.5 milliGRAM(s) Oral daily  tacrolimus 0.5 milliGRAM(s) Oral at bedtime  tacrolimus 1 milliGRAM(s) Oral daily    MEDICATIONS  (PRN):  acetaminophen     Tablet .. 650 milliGRAM(s) Oral every 6 hours PRN Temp greater or equal to 38C (100.4F), Mild Pain (1 - 3)  dextrose Oral Gel 15 Gram(s) Oral once PRN Blood Glucose LESS THAN 70 milliGRAM(s)/deciliter      Allergies    No Known Allergies    Intolerances      Review of Systems:  Constitutional:  No fever, No chills.  Eye:  No eye pain, No blurring.   Ear/Nose/Mouth/Throat:  No nasal congestion, No sore throat.   Respiratory:  No shortness of breath, No cough.   Cardiovascular:  No chest pain, No palpitations.   Gastrointestinal:  No nausea, No vomiting, No diarrhea.   Genitourinary:  No dysuria, No hematuria.   Endocrine:  No excessive thirst, No polyuria.   Musculoskeletal:  + back pain, No decreased range of motion.   Neurologic:  Alert and oriented X4, No confusion, No numbness.   Additional ROS reviewed and negative except as indicated in HPI.        PHYSICAL EXAM:  VITALS: T(C): 36.7 (23 @ 17:00)  T(F): 98 (23 @ 17:00), Max: 98.4 (23 @ 16:30)  HR: 88 (23 @ 17:00) (83 - 91)  BP: 147/71 (23 @ 17:00) (114/64 - 148/82)  RR:  (24 - 30)  SpO2:  (96% - 100%)  Wt(kg): --  General: Well-developed male, No acute distress, Speaking full sentences.   Eye:  Extraocular movements are intact, No proptosis or lid lag.   HENT:  Normocephalic.   Neck:  Supple, Non-tender.   Respiratory:  Respirations are non-labored, Symmetric chest wall expansion, Breath sounds are equal.   Cardiovascular:  Borderline tachycardic, Regular rhythm, No edema.  Gastrointestinal:  Soft, Non-tender, Non-distended.   Musculoskeletal:  Normal range of motion, No gross joint swelling.   Feet:  No ulcers. Dystrophic toe nails.  Integumentary:  Warm, dry.  Mental Status Exam:  Orientedx4, Speech clear and coherent.   Neurologic:  Alert, Orientedx4, Normal motor function, No focal deficits, Cranial Nerves II-XII are grossly intact bilaterally.   Psychiatric:  Cooperative, Appropriate mood & affect.    POCT Blood Glucose.: 258 mg/dL (23 @ 17:11)  POCT Blood Glucose.: 328 mg/dL (23 @ 13:39)  POCT Blood Glucose.: 382 mg/dL (23 @ 12:10)  POCT Blood Glucose.: 353 mg/dL (23 @ 11:50)  POCT Blood Glucose.: 383 mg/dL (23 @ 09:09)  POCT Blood Glucose.: 265 mg/dL (23 @ 00:19)                            12.2   17.35 )-----------( 173      ( 2023 09:50 )             37.5           136  |  101  |  27<H>  ----------------------------<  298<H>  4.2   |  22  |  1.41<H>    eGFR: 54<L>    Ca    8.9        Mg     1.90       Phos  2.2         TPro  6.6  /  Alb  3.4  /  TBili  2.0<H>  /  DBili  x   /  AST  129<H>  /  ALT  233<H>  /  AlkPhos  159<H>        Thyroid Function Tests:   @ 09:50 TSH 1.42 FreeT4 -- T3 -- Anti TPO -- Anti Thyroglobulin Ab -- TSI --              Radiology:

## 2023-01-09 NOTE — ED PROVIDER NOTE - CLINICAL SUMMARY MEDICAL DECISION MAKING FREE TEXT BOX
Zainab Colón MD, PGY-3: 57 yo M w/ h/o CAD w/ 1x NATALIE, CABG in 2005, R kidney transplant in 2015 on prednisone, prograf and cellcept, T2DM, HTN, p/w SOB, cough, recent sick contacts. vs: satting mid 90s on 4L O2, pe wheezing, increased WOB. concern for reactive airway disease/new onset copd, bacterial pna, do not suspect acs. plan for basic labs, HFNC vs. bipap, steroids, duonebs, cxr. Zainab Colón MD, PGY-3: 59 yo M w/ h/o CAD w/ 1x NATALIE, CABG in 2005, R kidney transplant in 2015 on prednisone, prograf and cellcept, T2DM, HTN, p/w SOB, cough, recent sick contacts. vs: satting mid 90s on 4L O2, pe wheezing, increased WOB. concern for reactive airway disease/new onset copd, bacterial pna, do not suspect acs. plan for basic labs, HFNC vs. bipap, steroids, duonebs, cxr.    Silver: 58-year-old male with a past medical history of coronary disease right kidney transplant diabetes hypertension presenting with shortness of breath and cough.  There is hypoxia also.  Need to consider possibilities of pulmonary realism, pulmonary edema, pneumonia.  We will get x-rays as well as laboratory studies.  Due to the patient increased work of breathing and continued shortness of breath we will place the patient on BiPAP for further airway management to prevent need for intubation.  Patient did well on BiPAP in emergency department.  MICU was consulted based on criteria of new BiPAP use and they are stating patient is stable for floor.  Patient got broad-spectrum IV antibiotics cultures were drawn.  Initial lactate was elevated to 6 which started to clear with proper IV fluid resuscitation.

## 2023-01-09 NOTE — H&P ADULT - HISTORY OF PRESENT ILLNESS
68 yo male with h/o HTN, DM-2, HLD, CAD w/ 1x NATALIE, CABG in 2005, R kidney transplant in 2015 at [facility] on prednisone, prograf and cellcept, presents to ED with 2 day hx of SOB, a/w cough, +sick contact with family members have flu, denies chest pain/fever/chill. In ED, he was hypoxic to 88% on RA with increased WOB, improved to 96% on 4L NC. He was put on BIPAP for increased WOB, evaluated and rejected by ICU, BIPAP adjusted down to 10/5 and 30% per ICU recs. Pt denies hx of CHF. Lab showed WBC 14, Hgb 11.7, Cr 1.7, pro-BNP 6238, transaminitis ast/alt 229/289. ,

## 2023-01-09 NOTE — H&P ADULT - NSHPLABSRESULTS_GEN_ALL_CORE
LABS:                        11.7   14.95 )-----------( 169      ( 09 Jan 2023 01:14 )             36.6     01-09    130<L>  |  95<L>  |  28<H>  ----------------------------<  317<H>  4.7   |  18<L>  |  1.72<H>    Ca    9.1      09 Jan 2023 01:14    TPro  6.4  /  Alb  3.2<L>  /  TBili  1.7<H>  /  DBili  x   /  AST  229<H>  /  ALT  289<H>  /  AlkPhos  173<H>  01-09        [facility] 01-09 @ 03:50  pH: 7.36/pCO2: 38 /pO2: 39/HCO3: 22/lactate: 3.4  [facility] 01-09 @ 01:14  pH: 7.34/pCO2: 36 /pO2: 39/HCO3: 19/lactate: 6.0      EKG: reviewed, nsr, no ischemic st/t changes, lad    RADIOLOGY & ADDITIONAL TESTS:  < from: Xray Chest 2 Views PA/Lat (01.09.23 @ 02:55) >    Status post median sternotomy.  Right lower lung patchy opacity.  Left basilar linear atelectasis.  There is no pleural effusion or pneumothorax.  The heart size is normal.  The visualized osseous structures demonstrate no acute pathology.    IMPRESSION:  Right lower lung patchy opacity, compatible with pneumonia in the   appropriate clinical setting.

## 2023-01-09 NOTE — CONSULT NOTE ADULT - ASSESSMENT
66 yo male with h/o HTN, transplant DM, HLD, CAD w/ 1x NATALIE, CABG in 2005, R kidney transplant in 2015 at [facility] on prednisone, prograf and cellcept, presents to ED with 2 day hx of SOB, a/w cough, +sick contact with family members have flu, denies chest pain/fever/chill. Endocrine consulted for transplant DM with steroid-induced hyperglycemia. He had some mild DKA initially but this resolved quickly. Received methylprednisolone 125mg x1 early morning on 1/9. Will thereafter be on 2.5mg prednisone daily, which is his home dose.    Poorly controlled transplant DM with steroid-induced hyperglycemia  DM diagnosis: Transplant DM, diagnosed after transplant around 2015 or 2016  Last A1c: 7.7  Endocrinologist: None  Home DM meds: Per documentation, patient is on Lantus 12 units qhs, Novolog 6 units TIDac, metformin 500mg daily and alogliptin 25mg. Patient states he thinks he takes Lantus 6 units and Novolog 16 units though he admits he is unsure as his wife helps him with his meds. He seems to believe he takes metformin but not alogliptin.   -Hold oral DM agents while inpatient  -Increase Lantus to 14 units q24h. DO NOT HOLD IF NPO.  -Increase Admelog to 6 units TID pre-meal. HOLD IF NPO.  -Use moderate dose Admelog correction scale pre-meal. May lower to low dose scale as methylprednisolone dose wears off.  -Use moderate dose Admelog correction scale at bedtime. May lower to low dose scale as methylprednisolone dose wears off.  -Fingerstick BG before meals and bedtime  -Goal -180  -Carbohydrate consistent diet  Discharge plan:  -Likely to discharge patient home on basal/bolus insulin plus possibly orals. Final regimen pending clinical course.  -Recommend routine outpatient ophthalmology, podiatry and PCP f/u    HTN  -Management per primary team/nephro.    HLD  -On atorvastatin 20mg daily. Continue this if no contraindications.    Inderjit Boo DO, Endocrinology Fellow  For follow-up questions, discharge recommendations, or new consults please call answering service at [phone] (weekdays), [phone] (nights/weekends). For nonurgent matters, please email [email or web address] or [email or web address]. 66 yo male with h/o HTN, transplant DM, HLD, CAD w/ 1x NATALIE, CABG in 2005, R kidney transplant in 2015 at [facility] on prednisone, prograf and cellcept, presents to ED with 2 day hx of SOB, a/w cough, +sick contact with family members have flu, denies chest pain/fever/chill. Endocrine consulted for transplant DM with steroid-induced hyperglycemia. He had some mild DKA initially but this resolved quickly. Received methylprednisolone 125mg x1 early morning on 1/9. Will thereafter be on 2.5mg prednisone daily, which is his home dose.    Poorly controlled transplant DM with steroid-induced hyperglycemia  DM diagnosis: Transplant DM, diagnosed after transplant around 2015 or 2016  Last A1c: 7.7  Endocrinologist: None  Home DM meds: Per documentation, patient is on Lantus 12 units qhs, Novolog 6 units TIDac, metformin 500mg daily and alogliptin 25mg. Patient states he thinks he takes Lantus 6 units and Novolog 16 units though he admits he is unsure as his wife helps him with his meds. He seems to believe he takes metformin but not alogliptin.   -Hold oral DM agents while inpatient  -Increase Lantus to 14 units q24h. DO NOT HOLD IF NPO.  -Increase Admelog to 6 units TID pre-meal. HOLD IF NPO.  -Use moderate dose Admelog correction scale pre-meal. May lower to low dose scale as methylprednisolone dose wears off.  -Use moderate dose Admelog correction scale at bedtime. May lower to low dose scale as methylprednisolone dose wears off.  -Fingerstick BG before meals and bedtime  -Goal -180  -Carbohydrate consistent diet  Discharge plan:  -Likely to discharge patient home on basal/bolus insulin plus possibly orals. Lactate elevated and liver enzymes deranged. Final regimen pending clinical course.  -Recommend routine outpatient ophthalmology, podiatry and PCP f/u    HTN  -Management per primary team/nephro.    HLD  -On atorvastatin 20mg daily. Continue this if no contraindications.    Inderjit Boo DO, Endocrinology Fellow  For follow-up questions, discharge recommendations, or new consults please call answering service at [phone] (weekdays), [phone] (nights/weekends). For nonurgent matters, please email [email or web address] or [email or web address]. 66 yo male with h/o HTN, transplant DM, HLD, CAD w/ 1x NATALIE, CABG in 2005, R kidney transplant in 2015 at [facility] on prednisone, prograf and cellcept, presents to ED with 2 day hx of SOB, a/w cough, +sick contact with family members have flu, denies chest pain/fever/chill. Endocrine consulted for transplant DM with steroid-induced hyperglycemia. He had some mild DKA initially but this resolved quickly. Received methylprednisolone 125mg x1 early morning on 1/9. Will thereafter be on 2.5mg prednisone daily, which is his home dose.    Poorly controlled transplant DM with steroid-induced hyperglycemia  DM diagnosis: Transplant DM, diagnosed after transplant around 2015 or 2016  Last A1c: 7.7  Endocrinologist: None  Home DM meds: Per documentation, patient is on Lantus 12 units qhs, Novolog 6 units TIDac, metformin 500mg daily and alogliptin 25mg. Patient states he thinks he takes Lantus 6 units and Novolog 16 units though he admits he is unsure as his wife helps him with his meds. He seems to believe he takes metformin but not alogliptin.   -Hold oral DM agents while inpatient  -Increase Lantus to 14 units q24h. DO NOT HOLD IF NPO.  -Increase Admelog to 6 units TID pre-meal. HOLD IF NPO.  -Use moderate dose Admelog correction scale pre-meal. May lower to low dose scale as methylprednisolone dose wears off.  -Use moderate dose Admelog correction scale at bedtime. May lower to low dose scale as methylprednisolone dose wears off.  -Fingerstick BG before meals and bedtime  -Goal -180  -Carbohydrate consistent diet  Discharge plan:  -Likely to discharge patient home on basal/bolus insulin plus possibly orals. Lactate elevated and liver enzymes deranged. eGFR 47. Final regimen pending clinical course.  -Recommend routine outpatient ophthalmology, podiatry and PCP f/u    HTN  -Management per primary team/nephro.    HLD  -On atorvastatin 20mg daily. Continue this if no contraindications.    Inderjit Boo DO, Endocrinology Fellow  For follow-up questions, discharge recommendations, or new consults please call answering service at [phone] (weekdays), [phone] (nights/weekends). For nonurgent matters, please email [email or web address] or [email or web address].

## 2023-01-09 NOTE — ED ADULT NURSE NOTE - OBJECTIVE STATEMENT
66 y/o male presents to the ED c/o SOB. +sick contacts @ home (family is Flu+). Pt noted hypoxic to mid 80s on room air and productive cough. Increased WOB & desaturation noted with exertion. Pt placed on O2 @ 4L/min via NC with positive effect. Pt denies HA, fevers/chills, CP, palpitations, n/v, dizziness or syncope.

## 2023-01-09 NOTE — ED ADULT TRIAGE NOTE - CHIEF COMPLAINT QUOTE
Patient c/o SOB for one day. Family at home is flu+. Respirations labored in triage, 88% on room air, improved to 96% on 4L NC. Pt. endorses dizziness. PMH HTN, DM, CVA with left sided residuals, kidney transplant.

## 2023-01-09 NOTE — CONSULT NOTE ADULT - PROBLEM SELECTOR RECOMMENDATION 9
Pt with Hx of kidney transplantation in 2015 being admitted for SOB and cough. Pt tested positive for Influenza. Currently on BIPAP. Pt with unclear baseline Scr. On review of [facility], it was noted that Scr was 1.51 on 12/7/20. On this admission, Scr was elevated at 1.72 on 1/9/23. Last Scr is elevated at 1.59. No interim labs available to review. Obtain transplant kidney US. Check UA and urine electrolytes. Pt with severely elevated BG. Optmise Blood glucose as per primary team.  Monitor labs and urine output. Avoid any potential nephrotoxins. Dose medications as per eGFR.

## 2023-01-09 NOTE — CONSULT NOTE ADULT - SUBJECTIVE AND OBJECTIVE BOX
[facility] DIVISION OF KIDNEY DISEASES AND HYPERTENSION -- [phone]  -- INITIAL CONSULT NOTE  --------------------------------------------------------------------------------  HPI: 67-year-old male with DM, HTN, ESRD s/p DDRT (in 2015) at [facility], CAD was admitted to [facility] on 1/9/23 for SOB, cough for ~1-2 days. Pt was tested positive for Flu in the ER. Pt was desaturating in ER, was placed on BIPAP. Nephrology team was consulted for kidney transplant recipient management and IS medication management. .     Pt. says he received a DDRT in 2015(after being on HD for about 9 years) without any complications after transplant. Pt. says he developed ESKD secondary to longstanding DM and HTN. ON review of [facility], it was noted that Scr was 1.51 on 12/7/20. On this admission, Scr is elevated at 1.52. No interim labs available to review.     Pt. seen and examined in ER. Currently on BIPAP. Denies CP,  HA, N/V, abdominal pain or dizziness.     PAST HISTORY  --------------------------------------------------------------------------------  PAST MEDICAL & SURGICAL HISTORY:  CAD (coronary artery disease)      DM (diabetes mellitus)  Type 2      HTN (hypertension)      High cholesterol      S/P triple vessel bypass      S/P kidney transplant        FAMILY HISTORY:  FH: HTN (hypertension)      PAST SOCIAL HISTORY:    ALLERGIES & MEDICATIONS  --------------------------------------------------------------------------------  Allergies    No Known Allergies    Intolerances      Standing Inpatient Medications  aspirin  chewable 81 milliGRAM(s) Oral daily  atorvastatin 20 milliGRAM(s) Oral at bedtime  azithromycin  IVPB 500 milliGRAM(s) IV Intermittent every 24 hours  carvedilol 25 milliGRAM(s) Oral every 12 hours  cefTRIAXone   IVPB 1000 milliGRAM(s) IV Intermittent every 24 hours  cholecalciferol 1000 Unit(s) Oral daily  dextrose 5%. 1000 milliLiter(s) IV Continuous <Continuous>  dextrose 5%. 1000 milliLiter(s) IV Continuous <Continuous>  dextrose 50% Injectable 25 Gram(s) IV Push once  dextrose 50% Injectable 12.5 Gram(s) IV Push once  dextrose 50% Injectable 25 Gram(s) IV Push once  folic acid 1 milliGRAM(s) Oral daily  glucagon  Injectable 1 milliGRAM(s) IntraMuscular once  heparin   Injectable 5000 Unit(s) SubCutaneous every 12 hours  insulin glargine Injectable (LANTUS) 12 Unit(s) SubCutaneous every morning  insulin lispro (ADMELOG) corrective regimen sliding scale   SubCutaneous three times a day before meals  insulin lispro (ADMELOG) corrective regimen sliding scale   SubCutaneous at bedtime  insulin lispro Injectable (ADMELOG) 5 Unit(s) SubCutaneous three times a day before meals  isosorbide   mononitrate ER Tablet (IMDUR) 30 milliGRAM(s) Oral daily  mycophenolate mofetil 1000 milliGRAM(s) Oral two times a day  oseltamivir 30 milliGRAM(s) Oral once  predniSONE   Tablet 2.5 milliGRAM(s) Oral daily  tacrolimus 0.5 milliGRAM(s) Oral at bedtime  tacrolimus 1 milliGRAM(s) Oral daily    PRN Inpatient Medications  acetaminophen     Tablet .. 650 milliGRAM(s) Oral every 6 hours PRN  dextrose Oral Gel 15 Gram(s) Oral once PRN      REVIEW OF SYSTEMS  --------------------------------------------------------------------------------  Gen: See HPI  Skin: No rashes  Head/Eyes/Ears: No HA,   Respiratory: See HPI  CV: No chest pain  GI: No abdominal pain, diarrhea  : No dysuria, hematuria  MSK: No  edema  Heme: No easy bruising or bleeding  Psych: No significant depression    All other systems were reviewed and are negative, except as noted.    VITALS/PHYSICAL EXAM  --------------------------------------------------------------------------------  T(C): 36.7 (01-09-23 @ 05:00), Max: 36.7 (01-09-23 @ 05:00)  HR: 89 (01-09-23 @ 12:39) (83 - 91)  BP: 148/82 (01-09-23 @ 12:39) (114/64 - 148/82)  RR: 30 (01-09-23 @ 12:39) (24 - 30)  SpO2: 100% (01-09-23 @ 12:39) (96% - 100%)  Wt(kg): --  Height (cm): 162.6 (01-09-23 @ 10:27)  Weight (kg): 70 (01-09-23 @ 10:27)  BMI (kg/m2): 26.5 (01-09-23 @ 10:27)  BSA (m2): 1.75 (01-09-23 @ 10:27)      Physical Exam:  	Gen: appears ill  	HEENT: on BIPAP  	Pulm: good air entry B/L  	CV: S1S2  	Abd: Soft, +BS , transplant kidney site- non tender, no swelling  	Ext: No LE edema B/L  	Neuro: Awake  	Skin: Warm and dry  	Vascular access: IV peripheral canula    LABS/STUDIES  --------------------------------------------------------------------------------              12.2   17.35 >-----------<  173      [01-09-23 @ 09:50]              37.5     134  |  98  |  29  ----------------------------<  413      [01-09-23 @ 09:50]  4.6   |  21  |  1.59        Ca     8.8     [01-09-23 @ 09:50]      Mg     1.80     [01-09-23 @ 09:50]      Phos  2.9     [01-09-23 @ 09:50]    TPro  6.6  /  Alb  3.4  /  TBili  2.0  /  DBili  x   /  AST  129  /  ALT  233  /  AlkPhos  159  [01-09-23 @ 09:50]          [01-09-23 @ 09:50]    Creatinine Trend:  SCr 1.59 [01-09 @ 09:50]  SCr 1.72 [01-09 @ 01:14]    Urinalysis - [12-15-20 @ 18:54]      Color Yellow / Appearance Clear / SG 1.025 / pH 5.5      Gluc 200 mg/dL / Ketone Small  / Bili Negative / Urobili <2 mg/dL       Blood Negative / Protein Trace / Leuk Est Negative / Nitrite Negative      RBC  / WBC  / Hyaline  / Gran  / Sq Epi  / Non Sq Epi  / Bacteria     TSH 1.42      [01-09-23 @ 09:50]    HBsAg Nonreact      [12-16-20 @ 10:17]  HCV 0.07, Nonreact      [12-16-20 @ 10:17]

## 2023-01-09 NOTE — ED PROVIDER NOTE - NSICDXPASTMEDICALHX_GEN_ALL_CORE_FT
PAST MEDICAL HISTORY:  CAD (coronary artery disease)     DM (diabetes mellitus) Type 2    High cholesterol     HTN (hypertension)

## 2023-01-09 NOTE — H&P ADULT - PROBLEM SELECTOR PLAN 1
-pt with acute hypoxic respiratory failure in s/o influenza and pneumonia,   -currently on BIPAP for increased WOB, wean off, supplemental oxygen prn  -given ceftriaxone/zithromax, solumedrol 125mg, neb, 1 liter of fluid bolus in ED  -c/w ceftriaxone/zithromax  -tamiflu 30mg qd x5 days  -check urine legionella and strep -sepsis and acute hypoxic respiratory failure in s/o influenza and pneumonia,   -labs WBC 14.9, elevated lactate 6 -->3.4 after fluid bolus  -currently on BIPAP for increased WOB, wean off, supplemental oxygen prn  -given ceftriaxone/zithromax, solumedrol 125mg, neb, 1 liter of fluid bolus in ED  -c/w ceftriaxone/zithromax  -tamiflu 30mg qd x5 days  -check urine legionella and strep -sepsis and acute hypoxic respiratory failure in s/o influenza and pneumonia,   -labs WBC 14.9, elevated lactate 6 -->3.4 after fluid bolus, no CO2 retention on VBG, on admission pH 7.34, pCO2 36  -currently on BIPAP for increased WOB, wean off, supplemental oxygen prn  -given ceftriaxone/zithromax, solumedrol 125mg, neb, 1 liter of fluid bolus in ED  -c/w ceftriaxone/zithromax  -tamiflu 30mg bid x5 days  -check urine legionella and strep  -check leg doppler (d-dimer 1950, although no leg edema and no chest pain)  -low threshold to reconsult ICU if pt decompensates -sepsis and acute hypoxic respiratory failure in s/o influenza and pneumonia,   -labs WBC 14.9, elevated lactate 6 -->3.4 after fluid bolus, no CO2 retention on VBG, on admission pH 7.34, pCO2 36  -currently on BIPAP for increased WOB, wean off, supplemental oxygen prn  -given ceftriaxone/zithromax, solumedrol 125mg, neb, 1 liter of fluid bolus in ED  -c/w ceftriaxone/zithromax  -tamiflu 75mg x1 then 30mg bid x5 day course  -check urine legionella and strep  -check leg doppler (d-dimer 1950, although no leg edema and no chest pain)  -low threshold to reconsult ICU if pt decompensates -sepsis and acute hypoxic respiratory failure in s/o influenza and RLL pneumonia,   -labs WBC 14.9, elevated lactate 6 -->3.4 after fluid bolus, no CO2 retention on VBG, on admission pH 7.34, pCO2 36  -currently on BIPAP for increased WOB, wean off, supplemental oxygen prn  -given ceftriaxone/zithromax, solumedrol 125mg, neb, 1 liter of fluid bolus in ED  -c/w ceftriaxone/zithromax  -tamiflu 75mg x1 then 30mg bid x5 day course  -check urine legionella and strep  -check leg doppler (d-dimer 1950, although no leg edema and no chest pain)  -low threshold to reconsult ICU if pt decompensates

## 2023-01-09 NOTE — H&P ADULT - NSHPPHYSICALEXAM_GEN_ALL_CORE
Vital Signs Last 24 Hrs  T(C): 36.7 (09 Jan 2023 05:00), Max: 36.7 (09 Jan 2023 05:00)  T(F): 98 (09 Jan 2023 05:00), Max: 98 (09 Jan 2023 05:00)  HR: 91 (09 Jan 2023 08:28) (83 - 91)  BP: 136/70 (09 Jan 2023 08:28) (114/64 - 147/63)  BP(mean): --  RR: 28 (09 Jan 2023 08:28) (24 - 30)  SpO2: 100% (09 Jan 2023 08:28) (96% - 100%)    Parameters below as of 09 Jan 2023 08:28  Patient On (Oxygen Delivery Method): BiPAP/CPAP      CAPILLARY BLOOD GLUCOSE      POCT Blood Glucose.: 383 mg/dL (09 Jan 2023 09:09)  POCT Blood Glucose.: 265 mg/dL (09 Jan 2023 00:19)    I&O's Summary      PHYSICAL EXAM:  GENERAL: NAD, increased WOB , on BIPAP  HEAD:  Atraumatic, Normocephalic  EYES: EOMI, PERRLA, conjunctiva and sclera clear  NECK: Supple, No JVD  CHEST/LUNG: right lung crackles   HEART: Regular rate and rhythm; No murmurs, rubs, or gallops  ABDOMEN: Soft, Nontender, Nondistended; Bowel sounds present  EXTREMITIES:  2+ Peripheral Pulses, No clubbing, cyanosis, or edema  PSYCH: AAOx3  NEUROLOGY: non-focal, CN grossly intact  SKIN: No rashes or lesions

## 2023-01-09 NOTE — H&P ADULT - ASSESSMENT
68 yo male with h/o HTN, DM-2, HLD, CAD w/ 1x NATALIE, CABG in 2005, R kidney transplant in 2015 at [facility] on prednisone, prograf and cellcept, presents to ED with 2 day hx of SOB, cough, hypoxia, found to have influenza and RLL pneumonia

## 2023-01-09 NOTE — ED ADULT NURSE NOTE - COUGH
Pharmacy consult to dose Zosyn    Sebastien Tang is a 61 y.o. male 98.6 kg (217 lb 6 oz).  Pharmacy consulted to dose for PNA per Dr Kessler's request.    Anti-Infectives (From admission, onward)    Ordered     Dose/Rate Route Frequency Start Stop    04/02/21 0835  piperacillin-tazobactam (ZOSYN) 3.375 g in iso-osmotic dextrose 50 ml (premix)     Ordering Provider: Meg Ksesler MD    3.375 g  over 4 Hours Intravenous Every 8 Hours 04/02/21 1600 04/07/21 1559    04/02/21 0835  piperacillin-tazobactam (ZOSYN) 3.375 g in iso-osmotic dextrose 50 ml (premix)     Ordering Provider: Meg Kessler MD    3.375 g  over 30 Minutes Intravenous Once 04/02/21 0930      04/02/21 0823  Pharmacy to Dose Zosyn     Ordering Provider: Meg Kessler MD     Does not apply Continuous PRN 04/02/21 0821 04/07/21 0820           Estimated Creatinine Clearance: 66.5 mL/min (by C-G formula based on SCr of 1.25 mg/dL).  Creatinine   Date Value Ref Range Status   04/02/2021 1.25 0.76 - 1.27 mg/dL Final   04/01/2021 1.00 0.60 - 1.30 mg/dL Final     Comment:     Serial Number: [id number]:  [id number]   04/01/2021 1.03 0.76 - 1.27 mg/dL Final     WBC   Date Value Ref Range Status   04/02/2021 13.87 (H) 3.40 - 10.80 10*3/mm3 Final   04/01/2021 9.92 3.40 - 10.80 10*3/mm3 Final     Temp Readings from Last 2 Encounters:   04/02/21 99.6 °F (37.6 °C) (Oral)   03/08/21 97.3 °F (36.3 °C) (Tympanic)     Baseline cultures:      PLAN:  Zosyn dosed at 3.375gm IV q8h for now.  Scr increased slightly today, will monitor and adjust as needed.  Thank you Dr Kessler for the consult.    Madelyn Naranjo, PharmD, BCPS  04/02/21 08:53 EDT     moist unproductive

## 2023-01-09 NOTE — H&P ADULT - PROBLEM SELECTOR PLAN 2
-hx right renal transplant in 2015, pt with hyponatremia Na 130, bun/cr 28/1.72, met acidosis hco3 18, baseline creat appears to be 1.5-1.6 from Dec 2020  on tacrolimus 1mg am and 0.5mg pm, cellcept 1000mg bid, prednisone 2.5mg qd  -given a liter of fluid bolus in ED  -I called nephrology consult, f/u recs, may need to adjust in s/o infection   -check tacrolimus level in am  -daily BMP, Mg, phos  -avoid nephrotoxins

## 2023-01-09 NOTE — ED PROVIDER NOTE - ATTENDING CONTRIBUTION TO CARE
58-year-old male with past medical history of coronary disease with stents and CABG as well as a right kidney transplant done in 2015 on chronic steroids diabetes hypertension presenting with 1 day of shortness of breath.  Symptoms of gotten progressively worse and are now currently severe and constant.  He also has a productive cough.  Members of the family are sick with the flu.  Initial oxygen saturation was 88% which improved to 96 on 4 L nasal cannula.  He denies any fevers.    Vitals: I have reviewed the patients vital signs  General: nontoxic appearing  HEENT: Atraumatic, normocephalic, airway patent  Eyes: EOMI, tracking appropriately  Neck: no tracheal deviation  Chest/Lungs: no trauma, symmetric chest rise, speaking in partial sentences,  moderate resp distress  Heart: skin and extremities well perfused, regular rate and rhythm  Neuro: A+Ox3, appears non focal  MSK: no deformities  Skin: no cyanosis, no jaundice   Psych:  Normal mood and affect    For the full details of the medical decision making and ED clinical course please see the MDM and progress note sections in the main body of the provider note    Upon my evaluation, this patient had a high probability of imminent or life-threatening deterioration due to resp failure and septic shock, which required my direct attention, intervention, and personal management.  The patient has a  medical condition that impairs one or more vital organ systems.  Frequent personal assessment and adjustment of medical interventions was performed.      I have personally provided 40 minutes of critical care time exclusive of time spent on separately billable procedures. Time includes review of laboratory data, radiology results, discussion with consultants, patient and family; monitoring for potential decompensation, as well as time spent retrieving data and reviewing the chart and documenting the visit. Interventions were performed as documented above.

## 2023-01-09 NOTE — ED PROVIDER NOTE - PHYSICAL EXAMINATION
Gen: WDWN, NAD  HEENT: EOMI, no nasal discharge, mucous membranes moist  CV: tachycardic, regular rhythm on cardiac monitor  Resp: diffuse wheezing, + accessory muscle use, increased work of breathing  GI: Abdomen soft non-distended, NTTP  MSK: No open wounds, no bruising, no LE edema  Neuro: A&Ox4, following commands, moving all four extremities spontaneously  Psych: appropriate mood

## 2023-01-10 DIAGNOSIS — R79.89 OTHER SPECIFIED ABNORMAL FINDINGS OF BLOOD CHEMISTRY: ICD-10-CM

## 2023-01-10 LAB
-  COAGULASE NEGATIVE STAPHYLOCOCCUS: SIGNIFICANT CHANGE UP
ALBUMIN SERPL ELPH-MCNC: 2.6 G/DL — LOW (ref 3.3–5)
ALP SERPL-CCNC: 166 U/L — HIGH (ref 40–120)
ALT FLD-CCNC: 341 U/L — HIGH (ref 4–41)
ANION GAP SERPL CALC-SCNC: 13 MMOL/L — SIGNIFICANT CHANGE UP (ref 7–14)
APTT BLD: 30.3 SEC — SIGNIFICANT CHANGE UP (ref 27–36.3)
AST SERPL-CCNC: 414 U/L — HIGH (ref 4–40)
BASOPHILS # BLD AUTO: 0.03 K/UL — SIGNIFICANT CHANGE UP (ref 0–0.2)
BASOPHILS NFR BLD AUTO: 0.2 % — SIGNIFICANT CHANGE UP (ref 0–2)
BILIRUB SERPL-MCNC: 1 MG/DL — SIGNIFICANT CHANGE UP (ref 0.2–1.2)
BUN SERPL-MCNC: 39 MG/DL — HIGH (ref 7–23)
CALCIUM SERPL-MCNC: 9 MG/DL — SIGNIFICANT CHANGE UP (ref 8.4–10.5)
CHLORIDE SERPL-SCNC: 98 MMOL/L — SIGNIFICANT CHANGE UP (ref 98–107)
CO2 SERPL-SCNC: 20 MMOL/L — LOW (ref 22–31)
CREAT SERPL-MCNC: 1.54 MG/DL — HIGH (ref 0.5–1.3)
EGFR: 49 ML/MIN/1.73M2 — LOW
EOSINOPHIL # BLD AUTO: 0 K/UL — SIGNIFICANT CHANGE UP (ref 0–0.5)
EOSINOPHIL NFR BLD AUTO: 0 % — SIGNIFICANT CHANGE UP (ref 0–6)
GLUCOSE SERPL-MCNC: 416 MG/DL — HIGH (ref 70–99)
GRAM STN FLD: SIGNIFICANT CHANGE UP
HCT VFR BLD CALC: 37.1 % — LOW (ref 39–50)
HCV AB S/CO SERPL IA: 0.17 S/CO — SIGNIFICANT CHANGE UP (ref 0–0.99)
HCV AB SERPL-IMP: SIGNIFICANT CHANGE UP
HGB BLD-MCNC: 11.7 G/DL — LOW (ref 13–17)
IANC: 12.99 K/UL — HIGH (ref 1.8–7.4)
IMM GRANULOCYTES NFR BLD AUTO: 1.4 % — HIGH (ref 0–0.9)
INR BLD: 1.53 RATIO — HIGH (ref 0.88–1.16)
LYMPHOCYTES # BLD AUTO: 0.35 K/UL — LOW (ref 1–3.3)
LYMPHOCYTES # BLD AUTO: 2.4 % — LOW (ref 13–44)
MAGNESIUM SERPL-MCNC: 2.1 MG/DL — SIGNIFICANT CHANGE UP (ref 1.6–2.6)
MCHC RBC-ENTMCNC: 27.4 PG — SIGNIFICANT CHANGE UP (ref 27–34)
MCHC RBC-ENTMCNC: 31.5 GM/DL — LOW (ref 32–36)
MCV RBC AUTO: 86.9 FL — SIGNIFICANT CHANGE UP (ref 80–100)
METHOD TYPE: SIGNIFICANT CHANGE UP
MONOCYTES # BLD AUTO: 0.84 K/UL — SIGNIFICANT CHANGE UP (ref 0–0.9)
MONOCYTES NFR BLD AUTO: 5.8 % — SIGNIFICANT CHANGE UP (ref 2–14)
NEUTROPHILS # BLD AUTO: 12.99 K/UL — HIGH (ref 1.8–7.4)
NEUTROPHILS NFR BLD AUTO: 90.2 % — HIGH (ref 43–77)
NRBC # BLD: 0 /100 WBCS — SIGNIFICANT CHANGE UP (ref 0–0)
NRBC # FLD: 0 K/UL — SIGNIFICANT CHANGE UP (ref 0–0)
PHOSPHATE SERPL-MCNC: 3.1 MG/DL — SIGNIFICANT CHANGE UP (ref 2.5–4.5)
PLATELET # BLD AUTO: 153 K/UL — SIGNIFICANT CHANGE UP (ref 150–400)
POTASSIUM SERPL-MCNC: 4.6 MMOL/L — SIGNIFICANT CHANGE UP (ref 3.5–5.3)
POTASSIUM SERPL-SCNC: 4.6 MMOL/L — SIGNIFICANT CHANGE UP (ref 3.5–5.3)
PROT SERPL-MCNC: 6.1 G/DL — SIGNIFICANT CHANGE UP (ref 6–8.3)
PROTHROM AB SERPL-ACNC: 17.8 SEC — HIGH (ref 10.5–13.4)
RBC # BLD: 4.27 M/UL — SIGNIFICANT CHANGE UP (ref 4.2–5.8)
RBC # FLD: 15.4 % — HIGH (ref 10.3–14.5)
SODIUM SERPL-SCNC: 131 MMOL/L — LOW (ref 135–145)
SPECIMEN SOURCE: SIGNIFICANT CHANGE UP
TACROLIMUS SERPL-MCNC: 4.5 NG/ML — SIGNIFICANT CHANGE UP
WBC # BLD: 14.41 K/UL — HIGH (ref 3.8–10.5)
WBC # FLD AUTO: 14.41 K/UL — HIGH (ref 3.8–10.5)

## 2023-01-10 PROCEDURE — 99222 1ST HOSP IP/OBS MODERATE 55: CPT | Mod: GC

## 2023-01-10 PROCEDURE — 99222 1ST HOSP IP/OBS MODERATE 55: CPT

## 2023-01-10 PROCEDURE — 99232 SBSQ HOSP IP/OBS MODERATE 35: CPT

## 2023-01-10 PROCEDURE — 93306 TTE W/DOPPLER COMPLETE: CPT | Mod: 26

## 2023-01-10 PROCEDURE — 99233 SBSQ HOSP IP/OBS HIGH 50: CPT

## 2023-01-10 RX ORDER — INSULIN GLARGINE 100 [IU]/ML
16 INJECTION, SOLUTION SUBCUTANEOUS
Refills: 0 | Status: DISCONTINUED | OUTPATIENT
Start: 2023-01-11 | End: 2023-01-11

## 2023-01-10 RX ORDER — VANCOMYCIN HCL 1 G
1000 VIAL (EA) INTRAVENOUS ONCE
Refills: 0 | Status: DISCONTINUED | OUTPATIENT
Start: 2023-01-10 | End: 2023-01-10

## 2023-01-10 RX ADMIN — Medication 30 MILLIGRAM(S): at 21:58

## 2023-01-10 RX ADMIN — Medication 6 UNIT(S): at 18:26

## 2023-01-10 RX ADMIN — HEPARIN SODIUM 5000 UNIT(S): 5000 INJECTION INTRAVENOUS; SUBCUTANEOUS at 18:24

## 2023-01-10 RX ADMIN — INSULIN GLARGINE 14 UNIT(S): 100 INJECTION, SOLUTION SUBCUTANEOUS at 13:44

## 2023-01-10 RX ADMIN — Medication 1 MILLIGRAM(S): at 13:44

## 2023-01-10 RX ADMIN — CARVEDILOL PHOSPHATE 25 MILLIGRAM(S): 80 CAPSULE, EXTENDED RELEASE ORAL at 05:33

## 2023-01-10 RX ADMIN — TACROLIMUS 0.5 MILLIGRAM(S): 5 CAPSULE ORAL at 21:58

## 2023-01-10 RX ADMIN — ATORVASTATIN CALCIUM 20 MILLIGRAM(S): 80 TABLET, FILM COATED ORAL at 21:57

## 2023-01-10 RX ADMIN — HEPARIN SODIUM 5000 UNIT(S): 5000 INJECTION INTRAVENOUS; SUBCUTANEOUS at 05:32

## 2023-01-10 RX ADMIN — Medication 81 MILLIGRAM(S): at 13:56

## 2023-01-10 RX ADMIN — ISOSORBIDE MONONITRATE 30 MILLIGRAM(S): 60 TABLET, EXTENDED RELEASE ORAL at 13:44

## 2023-01-10 RX ADMIN — Medication 6 UNIT(S): at 09:56

## 2023-01-10 RX ADMIN — Medication 6 UNIT(S): at 13:42

## 2023-01-10 RX ADMIN — MYCOPHENOLATE MOFETIL 1000 MILLIGRAM(S): 250 CAPSULE ORAL at 05:33

## 2023-01-10 RX ADMIN — CARVEDILOL PHOSPHATE 25 MILLIGRAM(S): 80 CAPSULE, EXTENDED RELEASE ORAL at 18:24

## 2023-01-10 RX ADMIN — Medication 6: at 13:43

## 2023-01-10 RX ADMIN — Medication 10: at 09:55

## 2023-01-10 RX ADMIN — Medication 30 MILLIGRAM(S): at 05:33

## 2023-01-10 RX ADMIN — CEFTRIAXONE 100 MILLIGRAM(S): 500 INJECTION, POWDER, FOR SOLUTION INTRAMUSCULAR; INTRAVENOUS at 03:55

## 2023-01-10 RX ADMIN — Medication 1000 UNIT(S): at 13:44

## 2023-01-10 RX ADMIN — Medication 4: at 18:27

## 2023-01-10 RX ADMIN — AZITHROMYCIN 255 MILLIGRAM(S): 500 TABLET, FILM COATED ORAL at 04:42

## 2023-01-10 RX ADMIN — Medication 2.5 MILLIGRAM(S): at 05:33

## 2023-01-10 RX ADMIN — TACROLIMUS 1 MILLIGRAM(S): 5 CAPSULE ORAL at 13:44

## 2023-01-10 NOTE — CONSULT NOTE ADULT - ASSESSMENT
66 yo male with h/o HTN, DM-2, HLD, CAD w/ 1x NATALIE, CABG in 2005, R kidney transplant in 2015 at [facility] on prednisone, prograf and cellcept, presents to ED with 2 day hx of SOB, found to be flu +, desating requiring bipap    kidney transplant recipient  s/p right kidney transplant 2015 at [facility]   Prograf 1 mg in AM and 0.5 mg in the evening, Prednisone2.5mg BID, and Cellcept 1 gm BID. Recommend to resume home medications.   Check Prograf level in the AM. 30 min prior to am dose. Goal 4-7  would hold cellcept if septic     acute on ckd  baseline ~ 1.4  slight IZZY likely sec to prerenal  better today  check UA, urine cr, and urine na  will need optimize dm control    flu +  care per team   68 yo male with h/o HTN, DM-2, HLD, CAD w/ 1x NATALIE, CABG in 2005, R kidney transplant in 2015 at [facility] on prednisone, prograf and cellcept, presents to ED with 2 day hx of SOB, found to be flu +, desating requiring bipap    kidney transplant recipient  s/p right kidney transplant 2015 at [facility]   Prograf 1 mg in AM and 0.5 mg in the evening, Prednisone2.5mg BID, and Cellcept 1 gm BID. Recommend to hold MMF while pt has the flu. Had dosage today.   Check Tacrolimus level in the AM. 30 min prior to am dose. Goal 4-7  would hold cellcept if septic     acute on ckd  baseline ~ 1.4  slight IZZY likely sec to prerenal  better today  check UA, urine cr, and urine na  will need optimize dm control    Influenza A  care per team

## 2023-01-10 NOTE — PROGRESS NOTE ADULT - SUBJECTIVE AND OBJECTIVE BOX
Patient is a 67y old  Male who presents with a chief complaint of sob, flu, pneumonia, sepsis (10 Sudeep 2023 11:31)      SUBJECTIVE / OVERNIGHT EVENTS: Pt seen and examined at 10:40am, overnight events noted, pt is off of bipap and is currently on nasal canula oxygen at 3 lits, says he is comfortable with current oxygen, no sob, cough is better now, no chest pain or any other complaints, family at bedside. No other new issues reported.    MEDICATIONS  (STANDING):  aspirin  chewable 81 milliGRAM(s) Oral daily  atorvastatin 20 milliGRAM(s) Oral at bedtime  azithromycin  IVPB 500 milliGRAM(s) IV Intermittent every 24 hours  carvedilol 25 milliGRAM(s) Oral every 12 hours  cefTRIAXone   IVPB 1000 milliGRAM(s) IV Intermittent every 24 hours  cholecalciferol 1000 Unit(s) Oral daily  dextrose 5%. 1000 milliLiter(s) (100 mL/Hr) IV Continuous <Continuous>  dextrose 5%. 1000 milliLiter(s) (50 mL/Hr) IV Continuous <Continuous>  dextrose 50% Injectable 25 Gram(s) IV Push once  dextrose 50% Injectable 12.5 Gram(s) IV Push once  dextrose 50% Injectable 25 Gram(s) IV Push once  folic acid 1 milliGRAM(s) Oral daily  glucagon  Injectable 1 milliGRAM(s) IntraMuscular once  heparin   Injectable 5000 Unit(s) SubCutaneous every 12 hours  insulin glargine Injectable (LANTUS) 14 Unit(s) SubCutaneous <User Schedule>  insulin lispro (ADMELOG) corrective regimen sliding scale   SubCutaneous three times a day before meals  insulin lispro (ADMELOG) corrective regimen sliding scale   SubCutaneous at bedtime  insulin lispro Injectable (ADMELOG) 6 Unit(s) SubCutaneous three times a day before meals  isosorbide   mononitrate ER Tablet (IMDUR) 30 milliGRAM(s) Oral daily  mycophenolate mofetil 1000 milliGRAM(s) Oral two times a day  oseltamivir 30 milliGRAM(s) Oral two times a day  predniSONE   Tablet 2.5 milliGRAM(s) Oral daily  tacrolimus 0.5 milliGRAM(s) Oral at bedtime  tacrolimus 1 milliGRAM(s) Oral daily    MEDICATIONS  (PRN):  acetaminophen     Tablet .. 650 milliGRAM(s) Oral every 6 hours PRN Temp greater or equal to 38C (100.4F), Mild Pain (1 - 3)  dextrose Oral Gel 15 Gram(s) Oral once PRN Blood Glucose LESS THAN 70 milliGRAM(s)/deciliter      Vital Signs Last 24 Hrs  T(C): 36.8 (10 Sudeep 2023 13:44), Max: 36.9 (09 Jan 2023 16:30)  T(F): 98.3 (10 Sudeep 2023 13:44), Max: 98.4 (09 Jan 2023 16:30)  HR: 84 (10 Sudeep 2023 13:44) (78 - 92)  BP: 142/69 (10 Sudeep 2023 13:44) (125/75 - 147/71)  BP(mean): --  RR: 27 (10 Sudeep 2023 13:44) (19 - 27)  SpO2: 99% (10 Sudeep 2023 13:44) (98% - 100%)    Parameters below as of 10 Sudeep 2023 13:44  Patient On (Oxygen Delivery Method): nasal cannula  O2 Flow (L/min): 3    CAPILLARY BLOOD GLUCOSE      POCT Blood Glucose.: 263 mg/dL (10 Sudeep 2023 13:18)  POCT Blood Glucose.: 353 mg/dL (10 Sudeep 2023 09:26)  POCT Blood Glucose.: 341 mg/dL (09 Jan 2023 21:52)  POCT Blood Glucose.: 258 mg/dL (09 Jan 2023 17:11)    I&O's Summary      PHYSICAL EXAM:  GENERAL: NAD, appears comfortable  CHEST/LUNG: Rt lower lung field with crackles, left clear to auscultation  HEART: Regular rate and rhythm  ABDOMEN: Soft, Nontender, Nondistended  EXTREMITIES: no LE edema  PSYCH: Calm  NEUROLOGY: AAOx3  SKIN: No rashes or lesions    LABS:                        11.7   14.41 )-----------( 153      ( 10 Sudeep 2023 06:00 )             37.1     01-10    131<L>  |  98  |  39<H>  ----------------------------<  416<H>  4.6   |  20<L>  |  1.54<H>    Ca    9.0      10 Sudeep 2023 06:00  Phos  3.1     01-10  Mg     2.10     01-10    TPro  6.1  /  Alb  2.6<L>  /  TBili  1.0  /  DBili  x   /  AST  414<H>  /  ALT  341<H>  /  AlkPhos  166<H>  01-10      CARDIAC MARKERS ( 09 Jan 2023 09:50 )  x     / x     / 241 U/L / x     / x      CARDIAC MARKERS ( 09 Jan 2023 01:14 )  x     / x     / 305 U/L / x     / x              RADIOLOGY & ADDITIONAL TESTS:  < from: VA Duplex Ext Veins Lower Comp, Bilat. (01.09.23 @ 14:17) >  1.  No evidence of deep vein thrombosisin the right and  left lower extremities.  2.  No evidence of deep and superficial venous  insufficiency noted in the right and left lower  extremities.    < end of copied text >  < from: US Kidney and Bladder (01.09.23 @ 17:42) >  US KIDNEYS AND BLADDER                            < end of copied text >  < from: US Kidney and Bladder (01.09.23 @ 17:42) >  Echogenic right native kidney with cortical thinning, suggestive of   medical renal disease. No hydronephrosis.    Nonvisualization of the native left kidney, which may be surgically   absent. Correlate with surgical history.    Right lower quadrant transplant kidney without hydronephrosis.      < end of copied text >  < from: US Abdomen Upper Quadrant Right (01.09.23 @ 15:43) >  US ABDOMEN RT UPR QUADRANT            < end of copied text >  < from: US Abdomen Upper Quadrant Right (01.09.23 @ 15:43) >  No evidence for intrahepatic or extrahepatic biliary ductal dilatation.   No gallstones, gallbladder wall thickening or pericholecystic fluid.    < end of copied text >      Imaging Personally Reviewed:    Consultant(s) Notes Reviewed:      Care Discussed with Consultants/Other Providers:

## 2023-01-10 NOTE — PROGRESS NOTE ADULT - ASSESSMENT
68 yo male with h/o HTN, DM-2, HLD, CAD w/ 1x NATALIE, CABG in 2005, R kidney transplant in 2015 at [facility] on prednisone, prograf and cellcept, presents to ED with 2 day hx of SOB, cough, hypoxia, found to have influenza and RLL pneumonia

## 2023-01-10 NOTE — CONSULT NOTE ADULT - SUBJECTIVE AND OBJECTIVE BOX
HPI:  67M A1c 7.7%, CABG, renal transplant 2015.   Here 1/9 with two days of dyspnea and cough.   Flu A positive.   RLL opacity.   Reportedly hypoxic and required BIPAP.       PAST MEDICAL & SURGICAL HISTORY:  CAD (coronary artery disease)      DM (diabetes mellitus)  Type 2      HTN (hypertension)      High cholesterol      S/P triple vessel bypass      S/P kidney transplant          Allergies    No Known Allergies    Intolerances        ANTIMICROBIALS:  azithromycin  IVPB 500 every 24 hours  cefTRIAXone   IVPB 1000 every 24 hours  oseltamivir 30 two times a day  vancomycin  IVPB 1000 once      OTHER MEDS:  acetaminophen     Tablet .. 650 milliGRAM(s) Oral every 6 hours PRN  aspirin  chewable 81 milliGRAM(s) Oral daily  atorvastatin 20 milliGRAM(s) Oral at bedtime  carvedilol 25 milliGRAM(s) Oral every 12 hours  cholecalciferol 1000 Unit(s) Oral daily  dextrose 5%. 1000 milliLiter(s) IV Continuous <Continuous>  dextrose 5%. 1000 milliLiter(s) IV Continuous <Continuous>  dextrose 50% Injectable 25 Gram(s) IV Push once  dextrose 50% Injectable 12.5 Gram(s) IV Push once  dextrose 50% Injectable 25 Gram(s) IV Push once  dextrose Oral Gel 15 Gram(s) Oral once PRN  folic acid 1 milliGRAM(s) Oral daily  glucagon  Injectable 1 milliGRAM(s) IntraMuscular once  heparin   Injectable 5000 Unit(s) SubCutaneous every 12 hours  insulin glargine Injectable (LANTUS) 14 Unit(s) SubCutaneous <User Schedule>  insulin lispro (ADMELOG) corrective regimen sliding scale   SubCutaneous three times a day before meals  insulin lispro (ADMELOG) corrective regimen sliding scale   SubCutaneous at bedtime  insulin lispro Injectable (ADMELOG) 6 Unit(s) SubCutaneous three times a day before meals  isosorbide   mononitrate ER Tablet (IMDUR) 30 milliGRAM(s) Oral daily  mycophenolate mofetil 1000 milliGRAM(s) Oral two times a day  predniSONE   Tablet 2.5 milliGRAM(s) Oral daily  tacrolimus 0.5 milliGRAM(s) Oral at bedtime  tacrolimus 1 milliGRAM(s) Oral daily      SOCIAL HISTORY:    FAMILY HISTORY:  FH: HTN (hypertension)        ROS:  Unobtainable because:   All other systems negative   Constitutional: no fever, no chills  Eye: no vision changes  ENT: no sore throat, no rhinorrhea  Cardiovascular: no chest pain, no palpitation  Respiratory: no SOB, no cough  GI:  no abd pain, no vomiting, no diarrhea  urinary: no dysuria, no hematuria, no flank pain  musculoskeletal: no joint pain, no joint swelling  skin: no rash  neurology: no headache, no change in mental status  psych: no anxiety    Physical Exam:  General: awake, alert, non toxic  Head: atraumatic, normocephalic  Eyes: normal sclera and conjunctiva  ENT: no LAD, neck supple  Cardio: regular rate and rhythm   Respiratory: nonlabored on room air, clear bilaterally, no wheezing  abd: soft, bowel sounds present, not tender  : no suprapubic tenderness, no lee  Musculoskeletal: no joint swelling, no edema  Skin: no rash  vascular: no phlebitis  Neurologic: no focal deficits  psych: normal affect       Drug Dosing Weight  Height (cm): 162.6 (09 Jan 2023 10:27)  Weight (kg): 70 (09 Jan 2023 10:27)  BMI (kg/m2): 26.5 (09 Jan 2023 10:27)  BSA (m2): 1.75 (09 Jan 2023 10:27)    Vital Signs Last 24 Hrs  T(F): 98.3 (01-10-23 @ 05:15), Max: 98.4 (01-09-23 @ 16:30)    Vital Signs Last 24 Hrs  HR: 78 (01-10-23 @ 09:56) (78 - 92)  BP: 135/71 (01-10-23 @ 09:56) (125/75 - 147/71)  RR: 27 (01-10-23 @ 09:56)  SpO2: 99% (01-10-23 @ 09:56) (98% - 100%)  Wt(kg): --                          11.7   14.41 )-----------( 153      ( 10 Sudeep 2023 06:00 )             37.1       01-10    131<L>  |  98  |  39<H>  ----------------------------<  416<H>  4.6   |  20<L>  |  1.54<H>    Ca    9.0      10 Sudeep 2023 06:00  Phos  3.1     01-10  Mg     2.10     01-10    TPro  6.1  /  Alb  2.6<L>  /  TBili  1.0  /  DBili  x   /  AST  414<H>  /  ALT  341<H>  /  AlkPhos  166<H>  01-10          MICROBIOLOGY:  Culture - Blood (collected 01-09-23 @ 09:50)  Source: .Blood Blood-Peripheral  Gram Stain (01-10-23 @ 09:46):    Growth in aerobic bottle: Gram Positive Cocci in Clusters  Preliminary Report (01-10-23 @ 09:46):    Growth in aerobic bottle: Gram Positive Cocci in Clusters    ***Blood Panel PCR results on this specimen are available    approximately 3 hours after the Gram stain result.***    Gram stain, PCR, and/or culture results may not always    correspond due to difference in methodologies.    ************************************************************    This PCR assay was performed by multiplex PCR. This    Assay tests for 66 bacterial and resistance gene targets.    Please refer to the [facility] test directory    at [email or web address] for details.  Organism: Blood Culture PCR (01-10-23 @ 11:25)  Organism: Blood Culture PCR (01-10-23 @ 11:25)      -  Coagulase negative Staphylococcus: Detec      Method Type: PCR    Rapid RVP Result: Detected (01-09 @ 02:19)        RADIOLOGY:  Images below reviewed personally  US Kidney and Bladder (01.09.23 @ 17:42)   Echogenic right native kidney with cortical thinning, suggestive of   medical renal disease. No hydronephrosis.  Nonvisualization of the native left kidney, which may be surgically   absent. Correlate with surgical history.  Right lower quadrant transplant kidney without hydronephrosis.    US Abdomen Upper Quadrant Right (01.09.23 @ 15:43)   No evidence for intrahepatic or extrahepatic biliary ductal dilatation.   No gallstones, gallbladder wall thickening or pericholecystic fluid.    Xray Chest 2 Views PA/Lat (01.09.23 @ 02:55)   Right lower lung patchy opacity, compatible with pneumonia in the   appropriate clinical setting.   HPI:  67M A1c 7.7%, CABG, renal transplant 2015.   Here 1/9 with two days of dyspnea and cough.   Flu A positive with RLL opacity.   Reportedly hypoxic although not documented in flow sheets, required BIPAP, now on nasal cannula.   Feels much better.   Lives with his daughter, son in law and their kids who had a URI.   No fevers or chills.   Brings up some sputum with coughing.       PAST MEDICAL & SURGICAL HISTORY:  CAD (coronary artery disease)      DM (diabetes mellitus)  Type 2      HTN (hypertension)      High cholesterol      S/P triple vessel bypass      S/P kidney transplant          Allergies    No Known Allergies    Intolerances        ANTIMICROBIALS:  azithromycin  IVPB 500 every 24 hours  cefTRIAXone   IVPB 1000 every 24 hours  oseltamivir 30 two times a day  vancomycin  IVPB 1000 once      OTHER MEDS:  acetaminophen     Tablet .. 650 milliGRAM(s) Oral every 6 hours PRN  aspirin  chewable 81 milliGRAM(s) Oral daily  atorvastatin 20 milliGRAM(s) Oral at bedtime  carvedilol 25 milliGRAM(s) Oral every 12 hours  cholecalciferol 1000 Unit(s) Oral daily  dextrose 5%. 1000 milliLiter(s) IV Continuous <Continuous>  dextrose 5%. 1000 milliLiter(s) IV Continuous <Continuous>  dextrose 50% Injectable 25 Gram(s) IV Push once  dextrose 50% Injectable 12.5 Gram(s) IV Push once  dextrose 50% Injectable 25 Gram(s) IV Push once  dextrose Oral Gel 15 Gram(s) Oral once PRN  folic acid 1 milliGRAM(s) Oral daily  glucagon  Injectable 1 milliGRAM(s) IntraMuscular once  heparin   Injectable 5000 Unit(s) SubCutaneous every 12 hours  insulin glargine Injectable (LANTUS) 14 Unit(s) SubCutaneous <User Schedule>  insulin lispro (ADMELOG) corrective regimen sliding scale   SubCutaneous three times a day before meals  insulin lispro (ADMELOG) corrective regimen sliding scale   SubCutaneous at bedtime  insulin lispro Injectable (ADMELOG) 6 Unit(s) SubCutaneous three times a day before meals  isosorbide   mononitrate ER Tablet (IMDUR) 30 milliGRAM(s) Oral daily  mycophenolate mofetil 1000 milliGRAM(s) Oral two times a day  predniSONE   Tablet 2.5 milliGRAM(s) Oral daily  tacrolimus 0.5 milliGRAM(s) Oral at bedtime  tacrolimus 1 milliGRAM(s) Oral daily      SOCIAL HISTORY: Nonsmoker. Only drinks water, tea, coffee etc     FAMILY HISTORY:  FH: HTN (hypertension)        ROS:  All other systems negative   Constitutional: no fever, no chills  Eye: no vision changes  ENT: no sore throat, no rhinorrhea  Cardiovascular: no chest pain, no palpitation  Respiratory: per HPI   GI:  no abd pain, no vomiting, no diarrhea  urinary: no dysuria, no hematuria, no flank pain  musculoskeletal: no joint pain, no joint swelling  skin: no rash  neurology: no headache, no change in mental status  psych: no anxiety    Physical Exam:  General: awake, alert, non toxic  Head: atraumatic, normocephalic  Eyes: normal sclera and conjunctiva  ENT: no LAD, neck supple  Cardio: regular rate   Respiratory: nonlabored nasal cannula, mildly coarse sounds bilaterally, no wheezing  abd: soft, bowel sounds present, not tender  : no suprapubic tenderness  Musculoskeletal: no joint swelling, no edema  Skin: no rash  vascular: no phlebitis. tortuous left arm AVF nontender   Neurologic: no focal deficits  psych: normal affect       Drug Dosing Weight  Height (cm): 162.6 (09 Jan 2023 10:27)  Weight (kg): 70 (09 Jan 2023 10:27)  BMI (kg/m2): 26.5 (09 Jan 2023 10:27)  BSA (m2): 1.75 (09 Jan 2023 10:27)    Vital Signs Last 24 Hrs  T(F): 98.3 (01-10-23 @ 05:15), Max: 98.4 (01-09-23 @ 16:30)    Vital Signs Last 24 Hrs  HR: 78 (01-10-23 @ 09:56) (78 - 92)  BP: 135/71 (01-10-23 @ 09:56) (125/75 - 147/71)  RR: 27 (01-10-23 @ 09:56)  SpO2: 99% (01-10-23 @ 09:56) (98% - 100%)  Wt(kg): --                          11.7   14.41 )-----------( 153      ( 10 Sudeep 2023 06:00 )             37.1       01-10    131<L>  |  98  |  39<H>  ----------------------------<  416<H>  4.6   |  20<L>  |  1.54<H>    Ca    9.0      10 Sudeep 2023 06:00  Phos  3.1     01-10  Mg     2.10     01-10    TPro  6.1  /  Alb  2.6<L>  /  TBili  1.0  /  DBili  x   /  AST  414<H>  /  ALT  341<H>  /  AlkPhos  166<H>  01-10          MICROBIOLOGY:  Culture - Blood (collected 01-09-23 @ 09:50)  Source: .Blood Blood-Peripheral  Gram Stain (01-10-23 @ 09:46):    Growth in aerobic bottle: Gram Positive Cocci in Clusters  Preliminary Report (01-10-23 @ 09:46):    Growth in aerobic bottle: Gram Positive Cocci in Clusters    ***Blood Panel PCR results on this specimen are available    approximately 3 hours after the Gram stain result.***    Gram stain, PCR, and/or culture results may not always    correspond due to difference in methodologies.    ************************************************************    This PCR assay was performed by multiplex PCR. This    Assay tests for 66 bacterial and resistance gene targets.    Please refer to the [facility] test directory    at [email or web address] for details.  Organism: Blood Culture PCR (01-10-23 @ 11:25)  Organism: Blood Culture PCR (01-10-23 @ 11:25)      -  Coagulase negative Staphylococcus: Detec      Method Type: PCR    Rapid RVP Result: Detected (01-09 @ 02:19)        RADIOLOGY:  Images below reviewed personally  US Kidney and Bladder (01.09.23 @ 17:42)   Echogenic right native kidney with cortical thinning, suggestive of   medical renal disease. No hydronephrosis.  Nonvisualization of the native left kidney, which may be surgically   absent. Correlate with surgical history.  Right lower quadrant transplant kidney without hydronephrosis.    US Abdomen Upper Quadrant Right (01.09.23 @ 15:43)   No evidence for intrahepatic or extrahepatic biliary ductal dilatation.   No gallstones, gallbladder wall thickening or pericholecystic fluid.    Xray Chest 2 Views PA/Lat (01.09.23 @ 02:55)   Right lower lung patchy opacity, compatible with pneumonia in the   appropriate clinical setting.

## 2023-01-10 NOTE — PATIENT PROFILE ADULT - FALL HARM RISK - HARM RISK INTERVENTIONS

## 2023-01-10 NOTE — PROVIDER CONTACT NOTE (CRITICAL VALUE NOTIFICATION) - SITUATION
Blood culture from 1/9: Prelim results show growth in aerobic bottle - gram positive cocci in clusters.

## 2023-01-10 NOTE — PROGRESS NOTE ADULT - PROBLEM SELECTOR PLAN 4
trend LFT daily  hepatitis panel neg  -RUQ Ultrasound showed No evidence for intrahepatic or extrahepatic biliary ductal dilatation. No gallstones, gallbladder wall thickening or pericholecystic fluid. avoid hepatotoxins  -Hepatology consulted, f/u consult recs

## 2023-01-10 NOTE — CONSULT NOTE ADULT - ASSESSMENT
67M A1c 7.7%, CABG, renal transplant 2015.   Here 1/9 with two days of dyspnea and cough.   Flu A positive.   RLL opacity.   Reportedly hypoxic and required BIPAP.     Tal Godinez MD   Infectious Disease   Available on TEAMS. After 5PM and on weekends please page fellow on call or call [phone] 67M A1c 7.7%, CABG, renal transplant 2015.   Here 1/9 with Flu A.   His acuity of symptoms (two days) makes superimposed bacterial infection less likely.   RLL opacity can be seen with viral pneumonia too.   However, given relative immunocompromised state and degree of illness, can continue bacterial coverage for now.     CoNS on blood cultures appears contaminant. AV fistula not graft, no hardware.     Suggest  -I stopped Vanc  -monitor blood cultures   -can continue Ceftriaxone Azithromycin pending further workup   -I ordered a MRSA PCR     Discussed with medicine     Tal Godinez MD   Infectious Disease   Available on TEAMS. After 5PM and on weekends please page fellow on call or call [phone] 67M A1c 7.7%, CABG, renal transplant 2015.   Here 1/9 with Flu A.   His acuity of symptoms (two days) makes superimposed bacterial infection less likely.   RLL opacity can be seen with viral pneumonia too.   However, given relative immunocompromised state and degree of illness, can continue bacterial coverage for now.     CoNS on blood cultures appears contaminant. AV fistula not graft, no hardware.     Suggest  -Tamiflu  -I stopped Vanc  -monitor blood cultures   -can continue Ceftriaxone Azithromycin pending further workup   -I ordered a MRSA PCR     Discussed with medicine     Tal Godinez MD   Infectious Disease   Available on TEAMS. After 5PM and on weekends please page fellow on call or call [phone]

## 2023-01-10 NOTE — CONSULT NOTE ADULT - ASSESSMENT
66yo M with PMH of HTN, DM2, HLD, CAD w/ 1x NATALIE, CABG in 2005, R kidney transplant in 2015 on prednisone, prograf and cellcept who presents with URI symptoms. Hepatology consulted for elevated liver tests.    # Elevated liver tests - Mostly hepatocellular, uptrending. No INR. Noted elevated pro-BNP. DDx includes hepatic congestion vs. sepsis related vs.  66yo M with PMH of HTN, DM2, HLD, CAD w/ 1x NATALIE, CABG in 2005, R kidney transplant in 2015 on prednisone, prograf and cellcept who presents with URI symptoms. Hepatology consulted for elevated liver tests.    # Elevated liver tests - Mostly hepatocellular, uptrending. No INR. Noted elevated pro-BNP. DDx includes hepatic congestion vs. viral hepatitis in an immunocompromised patient vs. sepsis related vs. DILI.   # Influenza  # Renal transplant  # CAD    Recommendations:  - TTE  - Monitor CMP, CBC, coags  - Please get INR today  - Please check SHOAIB, ASMA, EBV PCR, CMV PCR, HSV PCR  - Rest of care per primary team    Please note that the recommendations are not final until attested by an attending.    Thank you for involving us in the care of this patient. Please reach out if any further questions.    Irving Stewart, PGY-6  Gastroenterology/Hepatology Fellow    Available on [employer] Teams  After 5PM/Weekends, please contact the on-call GI fellow: [phone]   66yo M with PMH of HTN, DM2, HLD, CAD w/ 1x NATALIE, CABG in 2005, R kidney transplant in 2015 on prednisone, prograf and cellcept who presents with URI symptoms. Hepatology consulted for elevated liver tests.    # Elevated liver tests - Mostly hepatocellular, uptrending. No INR. Noted elevated pro-BNP. DDx includes hepatic congestion vs. viral hepatitis in an immunocompromised patient vs. sepsis related vs. DILI.   # Influenza  # Renal transplant  # CAD    Recommendations:  - TTE  - Monitor CMP, CBC, coags  - Please get INR today  - Please check SHOAIB, ASMA, EBV PCR, CMV PCR, HSV PCR  - MRI abdomen with IV contrast  - Rest of care per primary team    Please note that the recommendations are not final until attested by an attending.    Thank you for involving us in the care of this patient. Please reach out if any further questions.    Irving Stewart, PGY-6  Gastroenterology/Hepatology Fellow    Available on [employer] Teams  After 5PM/Weekends, please contact the on-call GI fellow: [phone]

## 2023-01-10 NOTE — CONSULT NOTE ADULT - SUBJECTIVE AND OBJECTIVE BOX
[facility] [place] [facility]   MD HUNTER BONILLA MD KRISTINE SOLTANPOUR, DO ANGELA WONG, PA        TEL:  OFFICE: [phone]  From 5pm-7am answering service [phone]    --- INITIAL RENAL CONSULT NOTE ---date of service 01-10-23 @ 10:58    HPI:  68 yo male with h/o HTN, DM-2, HLD, CAD w/ 1x NATALIE, CABG in 2005, R kidney transplant in 2015 at [facility] on prednisone, prograf and cellcept, presents to ED with 2 day hx of SOB, a/w cough, +sick contact with family members have flu, denies chest pain/fever/chill. In ED, he was hypoxic to 88% on RA with increased WOB, improved to 96% on 4L NC. He was put on BIPAP for increased WOB, evaluated and rejected by ICU, BIPAP adjusted down to 10/5 and 30% per ICU recs. Pt denies hx of CHF. Lab showed WBC 14, Hgb 11.7, Cr 1.7, pro-BNP 6238, transaminitis ast/alt 229/289. ,          Allergies:  No Known Allergies      PAST MEDICAL & SURGICAL HISTORY:  CAD (coronary artery disease)      DM (diabetes mellitus)  Type 2      HTN (hypertension)      High cholesterol      S/P triple vessel bypass      S/P kidney transplant          Home Medications Reviewed    Hospital Medications:   MEDICATIONS  (STANDING):  aspirin  chewable 81 milliGRAM(s) Oral daily  atorvastatin 20 milliGRAM(s) Oral at bedtime  azithromycin  IVPB 500 milliGRAM(s) IV Intermittent every 24 hours  carvedilol 25 milliGRAM(s) Oral every 12 hours  cefTRIAXone   IVPB 1000 milliGRAM(s) IV Intermittent every 24 hours  cholecalciferol 1000 Unit(s) Oral daily  dextrose 5%. 1000 milliLiter(s) (100 mL/Hr) IV Continuous <Continuous>  dextrose 5%. 1000 milliLiter(s) (50 mL/Hr) IV Continuous <Continuous>  dextrose 50% Injectable 25 Gram(s) IV Push once  dextrose 50% Injectable 12.5 Gram(s) IV Push once  dextrose 50% Injectable 25 Gram(s) IV Push once  folic acid 1 milliGRAM(s) Oral daily  glucagon  Injectable 1 milliGRAM(s) IntraMuscular once  heparin   Injectable 5000 Unit(s) SubCutaneous every 12 hours  insulin glargine Injectable (LANTUS) 14 Unit(s) SubCutaneous <User Schedule>  insulin lispro (ADMELOG) corrective regimen sliding scale   SubCutaneous three times a day before meals  insulin lispro (ADMELOG) corrective regimen sliding scale   SubCutaneous at bedtime  insulin lispro Injectable (ADMELOG) 6 Unit(s) SubCutaneous three times a day before meals  isosorbide   mononitrate ER Tablet (IMDUR) 30 milliGRAM(s) Oral daily  mycophenolate mofetil 1000 milliGRAM(s) Oral two times a day  oseltamivir 30 milliGRAM(s) Oral two times a day  predniSONE   Tablet 2.5 milliGRAM(s) Oral daily  tacrolimus 0.5 milliGRAM(s) Oral at bedtime  tacrolimus 1 milliGRAM(s) Oral daily      SOCIAL HISTORY:  Denies ETOh, Smoking,     FAMILY HISTORY:  FH: HTN (hypertension)        REVIEW OF SYSTEMS:  CONSTITUTIONAL: No weakness, fevers or chills  EYES/ENT: No visual changes;  No vertigo or throat pain   NECK: No pain or stiffness  RESPIRATORY: see hpi  CARDIOVASCULAR: No chest pain or palpitations.  GASTROINTESTINAL: No abdominal or epigastric pain. No nausea, vomiting, or hematemesis; No diarrhea or constipation. No melena or hematochezia.  GENITOURINARY: No dysuria, frequency, foamy urine, urinary urgency, incontinence or hematuria  NEUROLOGICAL: No numbness or weakness  SKIN: No itching, burning, rashes, or lesions   VASCULAR: No bilateral lower extremity edema.   All other review of systems is negative unless indicated above.    VITALS:  T(F): 98.3 (01-10-23 @ 05:15), Max: 98.4 (01-09-23 @ 16:30)  HR: 78 (01-10-23 @ 09:56)  BP: 135/71 (01-10-23 @ 09:56)  RR: 27 (01-10-23 @ 09:56)  SpO2: 99% (01-10-23 @ 09:56)  Wt(kg): --        PHYSICAL EXAM:  General: NAD  HEENT: anicteric sclera, oropharynx clear, MMM  Neck: No JVD  Respiratory: CTAB, no wheezes, rales or rhonchi  Cardiovascular: S1, S2, RRR  Gastrointestinal: BS+, soft, NT/ND  Extremities: No cyanosis or clubbing. No peripheral edema  Neurological: A/O x 3, no focal deficits  Psychiatric: Normal mood, normal affect  : No CVA tenderness. No lee.   Skin: No rashes    LABS:  01-10    131<L>  |  98  |  39<H>  ----------------------------<  416<H>  4.6   |  20<L>  |  1.54<H>    Ca    9.0      10 Sudeep 2023 06:00  Phos  3.1     01-10  Mg     2.10     01-10    TPro  6.1  /  Alb  2.6<L>  /  TBili  1.0  /  DBili      /  AST  414<H>  /  ALT  341<H>  /  AlkPhos  166<H>  01-10    Creatinine Trend: 1.54 <--, 1.41 <--, 1.59 <--, 1.72 <--                        11.7   14.41 )-----------( 153      ( 10 Sudeep 2023 06:00 )             37.1     Urine Studies:        RADIOLOGY & ADDITIONAL STUDIES:                 [facility]   MD HUNTER BONILLA MD KRISTINE SOLTANPOUR, DO ANGELA WONG, PA        TEL:  OFFICE: [phone]  From 5pm-7am answering service [phone]    --- INITIAL RENAL CONSULT NOTE ---date of service 01-10-23 @ 10:58    HPI:  66 yo male with h/o HTN, DM-2, HLD, CAD w/ 1x NATALIE, CABG in 2005, R kidney transplant in 2015 at [facility] on prednisone, tacrolimus and cellcept, presents to ED with 2 day hx of SOB, a/w cough, +sick contact with family members have flu, denies chest pain/fever/chill. In ED, he was hypoxic to 88% on RA with increased WOB, improved to 96% on 4L NC. He was put on BIPAP for increased WOB, evaluated and rejected by ICU, BIPAP adjusted down to 10/5 and 30% per ICU recs. Pt denies hx of CHF. Lab showed WBC 14, Hgb 11.7, Cr 1.7, pro-BNP 6238, transaminitis ast/alt 229/289. Pt found to have influenza. Nephrology consulted for immunosuppression suggestions while pt has influenza.           Allergies:  No Known Allergies      PAST MEDICAL & SURGICAL HISTORY:  CAD (coronary artery disease)      DM (diabetes mellitus)  Type 2      HTN (hypertension)      High cholesterol      S/P triple vessel bypass      S/P kidney transplant        Home Medications:  Alogliptin 25 mg oral tablet: 1 tab(s) orally once a day (09 Jan 2023 10:07)  aspirin 81 mg oral tablet: 1 tab(s) orally once a day (09 Jan 2023 10:07)  atorvastatin 20 mg oral tablet: 1 tab(s) orally once a day (09 Jan 2023 10:07)  carvedilol 25 mg oral tablet: 1 tab(s) orally 2 times a day (09 Jan 2023 10:07)  CellCept 500 mg oral tablet: 2 tab(s) orally 2 times a day (09 Jan 2023 10:07)  folic acid 1 mg oral tablet: 1 tab(s) orally once a day (09 Jan 2023 10:07)  isosorbide mononitrate 30 mg oral tablet, extended release: 1 tab(s) orally once a day (in the morning) (09 Jan 2023 10:07)  Lantus 100 units/mL subcutaneous solution: 12 unit(s) subcutaneous once a day (09 Jan 2023 10:07)  magnesium hydroxide 400 mg oral tablet, chewable: 1 tab(s) orally 2 times a day (09 Jan 2023 10:07)  metFORMIN 500 mg oral tablet, extended release: 500 tab(s) orally once a day (at bedtime) (09 Jan 2023 10:07)  NovoLOG 100 units/mL subcutaneous solution: 6 unit(s) subcutaneous 3 times a day (before meals) (09 Jan 2023 10:07)  predniSONE 2.5 mg oral tablet: 1 tab(s) orally once a day (09 Jan 2023 10:07)  Prograf 0.5 mg oral capsule: 1 cap(s) orally once a day (at bedtime) (09 Jan 2023 10:07)  Prograf 1 mg oral capsule: 1 cap(s) orally once a day (09 Jan 2023 10:07)  Vitamin D3 2000 intl units (50 mcg) oral tablet: 1 tab(s) orally once a day (09 Jan 2023 10:07)    Home Medications Reviewed    Hospital Medications:   MEDICATIONS  (STANDING):  aspirin  chewable 81 milliGRAM(s) Oral daily  atorvastatin 20 milliGRAM(s) Oral at bedtime  azithromycin  IVPB 500 milliGRAM(s) IV Intermittent every 24 hours  carvedilol 25 milliGRAM(s) Oral every 12 hours  cefTRIAXone   IVPB 1000 milliGRAM(s) IV Intermittent every 24 hours  cholecalciferol 1000 Unit(s) Oral daily  dextrose 5%. 1000 milliLiter(s) (100 mL/Hr) IV Continuous <Continuous>  dextrose 5%. 1000 milliLiter(s) (50 mL/Hr) IV Continuous <Continuous>  dextrose 50% Injectable 25 Gram(s) IV Push once  dextrose 50% Injectable 12.5 Gram(s) IV Push once  dextrose 50% Injectable 25 Gram(s) IV Push once  folic acid 1 milliGRAM(s) Oral daily  glucagon  Injectable 1 milliGRAM(s) IntraMuscular once  heparin   Injectable 5000 Unit(s) SubCutaneous every 12 hours  insulin glargine Injectable (LANTUS) 14 Unit(s) SubCutaneous <User Schedule>  insulin lispro (ADMELOG) corrective regimen sliding scale   SubCutaneous three times a day before meals  insulin lispro (ADMELOG) corrective regimen sliding scale   SubCutaneous at bedtime  insulin lispro Injectable (ADMELOG) 6 Unit(s) SubCutaneous three times a day before meals  isosorbide   mononitrate ER Tablet (IMDUR) 30 milliGRAM(s) Oral daily  mycophenolate mofetil 1000 milliGRAM(s) Oral two times a day  oseltamivir 30 milliGRAM(s) Oral two times a day  predniSONE   Tablet 2.5 milliGRAM(s) Oral daily  tacrolimus 0.5 milliGRAM(s) Oral at bedtime  tacrolimus 1 milliGRAM(s) Oral daily      SOCIAL HISTORY:  Denies ETOh, Smoking,     FAMILY HISTORY:  FH: HTN (hypertension)        REVIEW OF SYSTEMS:  CONSTITUTIONAL: No weakness, fevers or chills  EYES/ENT: No visual changes;  No vertigo or throat pain   NECK: No pain or stiffness  RESPIRATORY: see hpi  CARDIOVASCULAR: No chest pain or palpitations.  GASTROINTESTINAL: No abdominal or epigastric pain. No nausea, vomiting, or hematemesis; No diarrhea or constipation. No melena or hematochezia.  GENITOURINARY: No dysuria, frequency, foamy urine, urinary urgency, incontinence or hematuria  NEUROLOGICAL: No numbness or weakness  SKIN: No itching, burning, rashes, or lesions   VASCULAR: No bilateral lower extremity edema.   All other review of systems is negative unless indicated above.    VITALS:  T(F): 98.3 (01-10-23 @ 05:15), Max: 98.4 (01-09-23 @ 16:30)  HR: 78 (01-10-23 @ 09:56)  BP: 135/71 (01-10-23 @ 09:56)  RR: 27 (01-10-23 @ 09:56)  SpO2: 99% (01-10-23 @ 09:56)  Wt(kg): --        PHYSICAL EXAM:  General: NAD  HEENT: anicteric sclera, oropharynx clear, MMM  Neck: No JVD  Respiratory: CTAB, no wheezes, rales or rhonchi  Cardiovascular: S1, S2, RRR  Gastrointestinal: BS+, soft, NT/ND  Extremities: No cyanosis or clubbing. No peripheral edema  Neurological: A/O x 3, no focal deficits  Psychiatric: Normal mood, normal affect  : No CVA tenderness. No lee.   Skin: No rashes    LABS:  01-10    131<L>  |  98  |  39<H>  ----------------------------<  416<H>  4.6   |  20<L>  |  1.54<H>    Ca    9.0      10 Sudeep 2023 06:00  Phos  3.1     01-10  Mg     2.10     01-10    TPro  6.1  /  Alb  2.6<L>  /  TBili  1.0  /  DBili      /  AST  414<H>  /  ALT  341<H>  /  AlkPhos  166<H>  01-10    Creatinine Trend: 1.54 <--, 1.41 <--, 1.59 <--, 1.72 <--                        11.7   14.41 )-----------( 153      ( 10 Sudeep 2023 06:00 )             37.1     Urine Studies:        RADIOLOGY & ADDITIONAL STUDIES:  US Kidney and Bladder (01.09.23 @ 17:42)   Echogenic right native kidney with cortical thinning, suggestive of   medical renal disease. No hydronephrosis.  Nonvisualization of the native left kidney, which may be surgically   absent. Correlate with surgical history.  Right lower quadrant transplant kidney without hydronephrosis.    US Abdomen Upper Quadrant Right (01.09.23 @ 15:43)   No evidence for intrahepatic or extrahepatic biliary ductal dilatation.   No gallstones, gallbladder wall thickening or pericholecystic fluid.    Xray Chest 2 Views PA/Lat (01.09.23 @ 02:55)   Right lower lung patchy opacity, compatible with pneumonia in the   appropriate clinical setting.

## 2023-01-10 NOTE — CONSULT NOTE ADULT - NS ATTEND AMEND GEN_ALL_CORE FT
No pain, has shortness of breath. Reviewed all consults    Review of systems: All 10 points ROS was obtained except as above.     acetaminophen     Tablet .. 650 milliGRAM(s) Oral every 6 hours PRN  aspirin  chewable 81 milliGRAM(s) Oral daily  atorvastatin 20 milliGRAM(s) Oral at bedtime  azithromycin  IVPB 500 milliGRAM(s) IV Intermittent every 24 hours  carvedilol 25 milliGRAM(s) Oral every 12 hours  cefTRIAXone   IVPB 1000 milliGRAM(s) IV Intermittent every 24 hours  cholecalciferol 1000 Unit(s) Oral daily  dextrose 5%. 1000 milliLiter(s) IV Continuous <Continuous>  dextrose 5%. 1000 milliLiter(s) IV Continuous <Continuous>  dextrose 50% Injectable 25 Gram(s) IV Push once  dextrose 50% Injectable 12.5 Gram(s) IV Push once  dextrose 50% Injectable 25 Gram(s) IV Push once  dextrose Oral Gel 15 Gram(s) Oral once PRN  folic acid 1 milliGRAM(s) Oral daily  glucagon  Injectable 1 milliGRAM(s) IntraMuscular once  heparin   Injectable 5000 Unit(s) SubCutaneous every 12 hours  insulin glargine Injectable (LANTUS) 14 Unit(s) SubCutaneous <User Schedule>  insulin lispro (ADMELOG) corrective regimen sliding scale   SubCutaneous three times a day before meals  insulin lispro (ADMELOG) corrective regimen sliding scale   SubCutaneous at bedtime  insulin lispro Injectable (ADMELOG) 6 Unit(s) SubCutaneous three times a day before meals  isosorbide   mononitrate ER Tablet (IMDUR) 30 milliGRAM(s) Oral daily  mycophenolate mofetil 1000 milliGRAM(s) Oral two times a day  oseltamivir 30 milliGRAM(s) Oral two times a day  predniSONE   Tablet 2.5 milliGRAM(s) Oral daily  tacrolimus 0.5 milliGRAM(s) Oral at bedtime  tacrolimus 1 milliGRAM(s) Oral daily      VITAL:  T(C): , Max: 36.9 (01-09-23 @ 16:30)  T(F): , Max: 98.4 (01-09-23 @ 16:30)  HR: 84 (01-10-23 @ 13:44)  BP: 142/69 (01-10-23 @ 13:44)  BP(mean): --  RR: 27 (01-10-23 @ 13:44)  SpO2: 99% (01-10-23 @ 13:44)  Wt(kg): --      PHYSICAL EXAM:  General: NAD; Alert  HEENT:  NCAT; PERRLA  Neck: No JVD; supple  Respiratory: CTA-b/l  Cardiac: RRR s1s2  Gastrointestinal: BS+, soft, NT/ND  Urologic: No lee  Extremities: No peripheral edema  Access:     LABS:                          11.7   14.41 )-----------( 153      ( 10 Sudeep 2023 06:00 )             37.1     Na(131)/K(4.6)/Cl(98)/HCO3(20)/BUN(39)/Cr(1.54)Glu(416)/Ca(9.0)/Mg(2.10)/PO4(3.1)    01-10 @ 06:00  Na(136)/K(4.2)/Cl(101)/HCO3(22)/BUN(27)/Cr(1.41)Glu(298)/Ca(8.9)/Mg(1.90)/PO4(2.2)    01-09 @ 16:23  Na(134)/K(4.6)/Cl(98)/HCO3(21)/BUN(29)/Cr(1.59)Glu(413)/Ca(8.8)/Mg(1.80)/PO4(2.9)    01-09 @ 09:50  Na(130)/K(4.7)/Cl(95)/HCO3(18)/BUN(28)/Cr(1.72)Glu(317)/Ca(9.1)/Mg(--)/PO4(--)    01-09 @ 01:14            ASSESSMENT/PLAN  IZZY on CKD stage 3b  Hold MMF due to influenza A and leukocytosis  DCD KTX will continue tacrolimus and prednisone.    Jen Posey DO

## 2023-01-10 NOTE — CHART NOTE - NSCHARTNOTEFT_GEN_A_CORE
Called by RN For patient with GPC in clusters in blood culture x1. Discussed with Dr. To, ID consult called, already on ceftriaxone and azithromycine, advised to hold off on further abx until consult completed.
Dr. Galicia N contacted me about this pt. it is his patient so he will take over nephrology issues.  house Renal service will sign off at this time.
68 yo man without h/o COPD or asthma or heart failure, presented to ED with SOB, wheezing and hypox to 88% on RA.  CXR showed poss RLL PNA.  He was treated with duonebs and steroid.  VBG prior to BiPAP 7.34/36 with lactate decreasing 6 -> 3.  He was placed on BiPAP for tachypnea and increased WOB.  Pt is on BiPAP 12/5 40% RR 33 Vt 700's O2 sat 98%.  Changed to 10/5 and 30%.  RR remained even and unlabored at 30bpm.  He states he feels better with the BiPAP.  Lungs are clear with good air movement.  Although he was not retaining CO2, and is currently not wheezing, he feels better on the bipap.  For this reason and since it appears to be doing him no harm, he may continue on BiPAP as needed for comfort.  Please trial off BiPAP in the next couple of hours.  He doesnot require MICU level of care and may go to med floor bed on BiPAP.  D/W ED team

## 2023-01-10 NOTE — CONSULT NOTE ADULT - ATTENDING COMMENTS
Patient s/p renal transplant on immune suppression now admitted with influenza and bacteremia. Has elevated liver enzymes to around 200. Has had cardiac disease with stent placement and has sob on ambulation . Exam shows JVD. Patient may have liver abnormalities related to acute infection and associated hemodynamic change. or possible other cause. agree with infectious evaluation , liver disease serologic evaluation . Do Abd MRI with contrast to evaluate liver and vessels. Assess for heart failure.  Obtain prior liver test results to determine time course of liver abnormalities.
CKD stage 3 with IZZY and Hypoxia in the setting of flu  continue with immunosuppression meds as outlined above    no objection of diuresis if needed   Assessment and plan as outlined above. Monitor labs and urine output. Avoid any potential nephrotoxins. Dose medications as per eGFR.  Further detailed plan of management and recommendation as outlined above by the renal fellow.
Post transplant DM with hyperglycemia exacerbated by high dose steroid.  Agree with basal bolus insulin as outlined.    Jourdan Salmeron MD  Division of Endocrinology  Pager: [phone]    If after 6PM or before 9AM, or on weekends/holidays, please call endocrine answering service for assistance ([phone]).  For nonurgent matters email [email or web address] for assistance.

## 2023-01-10 NOTE — PROGRESS NOTE ADULT - PROBLEM SELECTOR PLAN 1
-sepsis and acute hypoxic respiratory failure in s/o influenza and RLL pneumonia,   -labs WBC 14.9, elevated lactate 6 -->3.4 after fluid bolus, no CO2 retention on VBG, on admission pH 7.34, pCO2 36  - BIPAP weaned off, currently on nasal canula oxygen at 3 lits, wean as tolerated  -given ceftriaxone/zithromax, solumedrol 125mg, neb, 1 liter of fluid bolus in ED  -c/w ceftriaxone/zithromax  -tamiflu 75mg x1 then 30mg bid x5 day course  -f/u urine legionella and strep  -leg doppler (d-dimer 1950, although no leg edema and no chest pain) showed- No evidence of deep vein thrombosis in the right and left lower extremities.  -Blood culture showed staph epi, likely contaminant  -ID consulted, f/u consult recs  -low threshold to reconsult ICU if pt decompensates

## 2023-01-10 NOTE — PROGRESS NOTE ADULT - ASSESSMENT
68 yo male with h/o HTN, transplant DM, HLD, CAD w/ 1x NATALIE, CABG in 2005, R kidney transplant in 2015 at [facility] on prednisone, prograf and cellcept, presents to ED with 2 day hx of SOB, a/w cough, +sick contact with family members have flu, denies chest pain/fever/chill. Endocrine consulted for transplant DM with steroid-induced hyperglycemia. He had some mild DKA initially but this resolved quickly. Received methylprednisolone 125mg x1 early morning on 1/9. Now 2.5mg prednisone daily, which is his home dose.    1. Poorly controlled transplant DM with steroid-induced hyperglycemia  DM diagnosis: Transplant DM, diagnosed after transplant around 2015 or 2016  Last A1c: 7.7  Endocrinologist: None  Home DM meds: Lantus 16 units every morning, Novolog 6 units TID (normally takes BID, lunch and dinner), Metformin 500mg daily and Alogliptin 25mg daily (wife assists)    While inpatient:  BG target 100-180 mg/dl  Steroid effect on glucose now decreasing, most recent FS above target but improving  Increase Lantus to 16 units daily at 12 PM  Continue Admelog 6 units TID before meals (Hold if NPO/skips meal)   Use moderate dose Admelog correction scale pre-meal  Use moderate dose Admelog correction scale at bedtime  Fingerstick BG before meals and bedtime  Carbohydrate consistent diet  Hypoglycemia protocol     Discharge Plan:   Likely to discharge patient home on basal/bolus insulin (dose TBD)  Counseled him and his wife that he should be taking rapid acting insulin 3x per day before meals   Lactate elevated and liver enzymes deranged - will most likely STOP Metformin - TBD  Recommend routine outpatient ophthalmology, podiatry and PCP followup     2. HTN  On Carvedilol, Isosorbide   Management per primary team/nephro.    3. HLD  Atorvastatin 20mg daily - consider holding in setting of elevated LFTs    Tuhy Burns  Nurse Practitioner  Division of Endocrinology & Diabetes  In house pager #[phone]/long range pager #[phone]    If before 9AM or after 6PM, or on weekends/holidays, please call endocrine answering service for assistance ([phone]).  For nonurgent matters email [email or web address] for assistance.  68 yo male with h/o HTN, transplant DM, HLD, CAD w/ 1x NATALIE, CABG in 2005, R kidney transplant in 2015 at [facility] on prednisone, prograf and cellcept, presents to ED with 2 day hx of SOB, a/w cough, +sick contact with family members have flu, denies chest pain/fever/chill. Endocrine consulted for transplant DM with steroid-induced hyperglycemia. He had some mild DKA initially but this resolved quickly. Received methylprednisolone 125mg x1 early morning on 1/9. Now 2.5mg prednisone daily, which is his home dose.    1. Poorly controlled transplant DM with steroid-induced hyperglycemia  DM diagnosis: Transplant DM, diagnosed after transplant around 2015 or 2016  Last A1c: 7.7  Endocrinologist: None  Home DM meds: Lantus 16 units every morning, Novolog 6 units TID (normally takes BID, lunch and dinner), Metformin 500mg daily and Alogliptin 25mg daily (wife assists)    While inpatient:  BG target 100-180 mg/dl  Steroid effect on glucose now decreasing, most recent FS above target but improving  Increase Lantus to 16 units daily at 12 PM  Continue Admelog 6 units TID before meals (Hold if NPO/skips meal)   Use moderate dose Admelog correction scale pre-meal  Use moderate dose Admelog correction scale at bedtime  Fingerstick BG before meals and bedtime  Carbohydrate consistent diet  Hypoglycemia protocol     Discharge Plan:   Likely to discharge patient home on basal/bolus insulin (dose TBD)  Counseled him and his wife that he should be taking rapid acting insulin 3x per day before meals   Lactate elevated and liver enzymes deranged - will most likely STOP Metformin - TBD  Recommend routine outpatient ophthalmology, podiatry and PCP followup     2. HTN  On Carvedilol, Isosorbide   Management per primary team/nephro.    3. HLD  On Atorvastatin 20mg daily - consider holding in setting of elevated LFTs    4. Vitamin D deficiency  On Cholecalciferol 1000 units daily (home med)  Check vitamin D 25OH    Thuy Burns  Nurse Practitioner  Division of Endocrinology & Diabetes  In house pager #[phone]/long range pager #[phone]    If before 9AM or after 6PM, or on weekends/holidays, please call endocrine answering service for assistance ([phone]).  For nonurgent matters email [email or web address] for assistance.

## 2023-01-10 NOTE — PROGRESS NOTE ADULT - PROBLEM SELECTOR PLAN 2
-hx right renal transplant in 2015, pt with hyponatremia Na 130, bun/cr 28/1.72, met acidosis hco3 18, baseline creat appears to be 1.5-1.6 from Dec 2020  on tacrolimus 1mg am and 0.5mg pm, cellcept 1000mg bid, prednisone 2.5mg qd  -given a liter of fluid bolus in ED  -Nephrology consult appreciated, f/u recs, may need to adjust in s/o infection   -f/u tacrolimus level   -daily BMP, Mg, phos  -avoid nephrotoxins  -Renal sono showed Echogenic right native kidney with cortical thinning, suggestive of medical renal disease. No hydronephrosis. Nonvisualization of the native left kidney, which may be surgically absent. Correlate with surgical history. Right lower quadrant transplant kidney without hydronephrosis.

## 2023-01-10 NOTE — PROGRESS NOTE ADULT - SUBJECTIVE AND OBJECTIVE BOX
Chief Complaint: DM, steroid induced hyperglycemia     History: Patient seen at bedside in [facility] with wife present. Reports he is eating meals. Hyperglycemia persisting this AM, now steroids decreased to home dose of Prednisone 2.5 mg daily    Patient and wife confirmed home DM regimen:  Lantus 16 units every morning  Novolog 6 units before meals (typically takes before lunch and dinner, not with each meal. Patient stated, "I take the long acting in the morning")  Metformin 500 mg daily (in evening)  Alogliptin 25 mg daily (in morning)     MEDICATIONS  (STANDING):  aspirin  chewable 81 milliGRAM(s) Oral daily  atorvastatin 20 milliGRAM(s) Oral at bedtime  azithromycin  IVPB 500 milliGRAM(s) IV Intermittent every 24 hours  carvedilol 25 milliGRAM(s) Oral every 12 hours  cefTRIAXone   IVPB 1000 milliGRAM(s) IV Intermittent every 24 hours  cholecalciferol 1000 Unit(s) Oral daily  dextrose 5%. 1000 milliLiter(s) (100 mL/Hr) IV Continuous <Continuous>  dextrose 5%. 1000 milliLiter(s) (50 mL/Hr) IV Continuous <Continuous>  dextrose 50% Injectable 25 Gram(s) IV Push once  dextrose 50% Injectable 12.5 Gram(s) IV Push once  dextrose 50% Injectable 25 Gram(s) IV Push once  folic acid 1 milliGRAM(s) Oral daily  glucagon  Injectable 1 milliGRAM(s) IntraMuscular once  heparin   Injectable 5000 Unit(s) SubCutaneous every 12 hours  insulin glargine Injectable (LANTUS) 14 Unit(s) SubCutaneous <User Schedule>  insulin lispro (ADMELOG) corrective regimen sliding scale   SubCutaneous three times a day before meals  insulin lispro (ADMELOG) corrective regimen sliding scale   SubCutaneous at bedtime  insulin lispro Injectable (ADMELOG) 6 Unit(s) SubCutaneous three times a day before meals  isosorbide   mononitrate ER Tablet (IMDUR) 30 milliGRAM(s) Oral daily  mycophenolate mofetil 1000 milliGRAM(s) Oral two times a day  oseltamivir 30 milliGRAM(s) Oral two times a day  predniSONE   Tablet 2.5 milliGRAM(s) Oral daily  tacrolimus 0.5 milliGRAM(s) Oral at bedtime  tacrolimus 1 milliGRAM(s) Oral daily    MEDICATIONS  (PRN):  acetaminophen     Tablet .. 650 milliGRAM(s) Oral every 6 hours PRN Temp greater or equal to 38C (100.4F), Mild Pain (1 - 3)  dextrose Oral Gel 15 Gram(s) Oral once PRN Blood Glucose LESS THAN 70 milliGRAM(s)/deciliter    No Known Allergies    Review of Systems:  Cardiovascular: No chest pain  Respiratory: No SOB  GI: No nausea, vomiting  Endocrine: no hypoglycemia     PHYSICAL EXAM:  VITALS: T(C): 36.8 (01-10-23 @ 13:44)  T(F): 98.3 (01-10-23 @ 13:44), Max: 98.4 (01-09-23 @ 16:30)  HR: 84 (01-10-23 @ 13:44) (78 - 92)  BP: 142/69 (01-10-23 @ 13:44) (125/75 - 147/71)  RR:  (19 - 27)  SpO2:  (98% - 100%)  Wt(kg): --  GENERAL: NAD  EYES: No proptosis, no lid lag, anicteric  HEENT:  Atraumatic, Normocephalic, moist mucous membranes  RESPIRATORY: unlabored respirations     CAPILLARY BLOOD GLUCOSE    POCT Blood Glucose.: 263 mg/dL (10 Sudeep 2023 13:18)  POCT Blood Glucose.: 353 mg/dL (10 Sudeep 2023 09:26)  POCT Blood Glucose.: 341 mg/dL (09 Jan 2023 21:52)  POCT Blood Glucose.: 258 mg/dL (09 Jan 2023 17:11)      01-10    131<L>  |  98  |  39<H>  ----------------------------<  416<H>  4.6   |  20<L>  |  1.54<H>    eGFR: 49<L>    Ca    9.0      01-10  Mg     2.10     01-10  Phos  3.1     01-10    TPro  6.1  /  Alb  2.6<L>  /  TBili  1.0  /  DBili  x   /  AST  414<H>  /  ALT  341<H>  /  AlkPhos  166<H>  01-10      Thyroid Function Tests:  01-09 @ 09:50 TSH 1.42 FreeT4 -- T3 -- Anti TPO -- Anti Thyroglobulin Ab -- TSI --      A1C with Estimated Average Glucose Result: 7.7 % (01-09-23 @ 09:50)    Diet, Regular:   Consistent Carbohydrate No Snacks (CSTCHO)  DASH/TLC Sodium & Cholesterol Restricted (DASH) (01-09-23 @ 09:50) [Active]

## 2023-01-10 NOTE — CONSULT NOTE ADULT - SUBJECTIVE AND OBJECTIVE BOX
Chief Complaint:  Patient is a 67y old  Male who presents with a chief complaint of sob, flu, pneumonia, sepsis (10 Sudeep 2023 10:57)      HPI:  Mr. Mcfadden is a 66yo M with PMH of HTN, DM2, HLD, CAD w/ 1x NATALIE, CABG in 2005, R kidney transplant in 2015 on prednisone, prograf and cellcept who presents with URI symptoms. Hepatology consulted for elevated liver tests.    Patient reports 2 days of URI symptoms with a recent exposure to a family member with flu. Found to be hypoxic in the ED, improving on BIPAP. Also found to have elevated liver tests. Denies significant EtOH use, OTC medications or herbal supplements. No known liver disease. No significant acetaminophen use.      Allergies:  No Known Allergies      Home Medications:  Alogliptin 25 mg oral tablet: 1 tab(s) orally once a day (09 Jan 2023 10:07)  aspirin 81 mg oral tablet: 1 tab(s) orally once a day (09 Jan 2023 10:07)  atorvastatin 20 mg oral tablet: 1 tab(s) orally once a day (09 Jan 2023 10:07)  carvedilol 25 mg oral tablet: 1 tab(s) orally 2 times a day (09 Jan 2023 10:07)  CellCept 500 mg oral tablet: 2 tab(s) orally 2 times a day (09 Jan 2023 10:07)  folic acid 1 mg oral tablet: 1 tab(s) orally once a day (09 Jan 2023 10:07)  isosorbide mononitrate 30 mg oral tablet, extended release: 1 tab(s) orally once a day (in the morning) (09 Jan 2023 10:07)  Lantus 100 units/mL subcutaneous solution: 12 unit(s) subcutaneous once a day (09 Jan 2023 10:07)  magnesium hydroxide 400 mg oral tablet, chewable: 1 tab(s) orally 2 times a day (09 Jan 2023 10:07)  metFORMIN 500 mg oral tablet, extended release: 500 tab(s) orally once a day (at bedtime) (09 Jan 2023 10:07)  NovoLOG 100 units/mL subcutaneous solution: 6 unit(s) subcutaneous 3 times a day (before meals) (09 Jan 2023 10:07)  predniSONE 2.5 mg oral tablet: 1 tab(s) orally once a day (09 Jan 2023 10:07)  Prograf 0.5 mg oral capsule: 1 cap(s) orally once a day (at bedtime) (09 Jan 2023 10:07)  Prograf 1 mg oral capsule: 1 cap(s) orally once a day (09 Jan 2023 10:07)  Vitamin D3 2000 intl units (50 mcg) oral tablet: 1 tab(s) orally once a day (09 Jan 2023 10:07)    Hospital Medications:  acetaminophen     Tablet .. 650 milliGRAM(s) Oral every 6 hours PRN  aspirin  chewable 81 milliGRAM(s) Oral daily  atorvastatin 20 milliGRAM(s) Oral at bedtime  azithromycin  IVPB 500 milliGRAM(s) IV Intermittent every 24 hours  carvedilol 25 milliGRAM(s) Oral every 12 hours  cefTRIAXone   IVPB 1000 milliGRAM(s) IV Intermittent every 24 hours  cholecalciferol 1000 Unit(s) Oral daily  dextrose 5%. 1000 milliLiter(s) IV Continuous <Continuous>  dextrose 5%. 1000 milliLiter(s) IV Continuous <Continuous>  dextrose 50% Injectable 25 Gram(s) IV Push once  dextrose 50% Injectable 12.5 Gram(s) IV Push once  dextrose 50% Injectable 25 Gram(s) IV Push once  dextrose Oral Gel 15 Gram(s) Oral once PRN  folic acid 1 milliGRAM(s) Oral daily  glucagon  Injectable 1 milliGRAM(s) IntraMuscular once  heparin   Injectable 5000 Unit(s) SubCutaneous every 12 hours  insulin glargine Injectable (LANTUS) 14 Unit(s) SubCutaneous <User Schedule>  insulin lispro (ADMELOG) corrective regimen sliding scale   SubCutaneous three times a day before meals  insulin lispro (ADMELOG) corrective regimen sliding scale   SubCutaneous at bedtime  insulin lispro Injectable (ADMELOG) 6 Unit(s) SubCutaneous three times a day before meals  isosorbide   mononitrate ER Tablet (IMDUR) 30 milliGRAM(s) Oral daily  mycophenolate mofetil 1000 milliGRAM(s) Oral two times a day  oseltamivir 30 milliGRAM(s) Oral two times a day  predniSONE   Tablet 2.5 milliGRAM(s) Oral daily  tacrolimus 0.5 milliGRAM(s) Oral at bedtime  tacrolimus 1 milliGRAM(s) Oral daily  vancomycin  IVPB 1000 milliGRAM(s) IV Intermittent once      PMHX/PSHX:  CAD (coronary artery disease)    DM (diabetes mellitus)    HTN (hypertension)    High cholesterol    S/P triple vessel bypass    S/P kidney transplant        Family history:  No pertinent family history in first degree relatives    FH: HTN (hypertension)        Denies family history of colon cancer/polyps, stomach cancer/polyps, pancreatic cancer/masses, liver cancer/disease, ovarian cancer and endometrial cancer.    Social History:     Tob: Denies  EtOH: As above  Illicit Drugs: Denies    ROS:   General:  No wt loss, fevers, chills, night sweats, fatigue  Eyes:  Good vision, no reported pain  ENT:  No sore throat, pain, runny nose, dysphagia  CV:  No pain, palpitations, hypo/hypertension  Pulm:  No dyspnea, cough, tachypnea, wheezing  GI:  As per HPI  :  No pain, bleeding, incontinence, nocturia  Muscle:  No pain, weakness  Neuro:  No weakness, tingling, memory problems  Psych:  No fatigue, insomnia, mood problems, depression  Endocrine:  No polyuria, polydipsia, cold/heat intolerance  Heme:  No petechiae, ecchymosis, easy bruisability  Skin:  No rash, tattoos, scars, edema    PHYSICAL EXAM:   GENERAL:  No acute distress  HEENT:  Normocephalic/atraumatic, no scleral icterus  CHEST:  No accessory muscle use  HEART:  Regular rate and rhythm  ABDOMEN:  Soft, non-tender, non-distended  EXTREMITIES: No cyanosis, clubbing, or edema  SKIN:  No rash  NEURO:  Alert and oriented x 3, no asterixis    Vital Signs:  Vital Signs Last 24 Hrs  T(C): 36.8 (10 Sudeep 2023 05:15), Max: 36.9 (09 Jan 2023 16:30)  T(F): 98.3 (10 Sudeep 2023 05:15), Max: 98.4 (09 Jan 2023 16:30)  HR: 78 (10 Sudeep 2023 09:56) (78 - 92)  BP: 135/71 (10 Sudeep 2023 09:56) (125/75 - 148/82)  BP(mean): --  RR: 27 (10 Sudeep 2023 09:56) (19 - 30)  SpO2: 99% (10 Sudeep 2023 09:56) (98% - 100%)    Parameters below as of 10 Sudeep 2023 09:56  Patient On (Oxygen Delivery Method): nasal cannula  O2 Flow (L/min): 3    Daily     Daily     LABS:                        11.7   14.41 )-----------( 153      ( 10 Sudeep 2023 06:00 )             37.1     Mean Cell Volume: 86.9 fL (01-10-23 @ 06:00)    01-10    131<L>  |  98  |  39<H>  ----------------------------<  416<H>  4.6   |  20<L>  |  1.54<H>    Ca    9.0      10 Sudeep 2023 06:00  Phos  3.1     01-10  Mg     2.10     01-10    TPro  6.1  /  Alb  2.6<L>  /  TBili  1.0  /  DBili  x   /  AST  414<H>  /  ALT  341<H>  /  AlkPhos  166<H>  01-10    LIVER FUNCTIONS - ( 10 Sudeep 2023 06:00 )  Alb: 2.6 g/dL / Pro: 6.1 g/dL / ALK PHOS: 166 U/L / ALT: 341 U/L / AST: 414 U/L / GGT: x                                       11.7   14.41 )-----------( 153      ( 10 Sudeep 2023 06:00 )             37.1                         12.2   17.35 )-----------( 173      ( 09 Jan 2023 09:50 )             37.5                         11.7   14.95 )-----------( 169      ( 09 Jan 2023 01:14 )             36.6       Imaging:  < from: US Abdomen Upper Quadrant Right (01.09.23 @ 15:43) >    ACC: [id number] EXAM:  US ABDOMEN RT UPR QUADRANT                          PROCEDURE DATE:  01/09/2023          INTERPRETATION:  CLINICAL INFORMATION: Right upper quadrant abdominal   pain. Elevated LFTs.    COMPARISON: Renal ultrasonography 12/15/2020.    TECHNIQUE: Sonography of the right upper quadrant.    FINDINGS:  Liver: The liver is normal in size. No focal hepatic masses are   identified.  Bile ducts: Normal caliber. Common bile duct measures 4 mm.  Gallbladder: No gallstones, gallbladderwall thickening or   pericholecystic fluid.  Pancreas: Visualized portions are within normal limits.  Right kidney: 9.2 cm. Echogenic with renal parenchymal thinning   suggesting chronic medical renal disease.. No hydronephrosis.  Ascites: None.  IVCand aorta: Visualized portions are within normal limits.    IMPRESSION:  No evidence for intrahepatic or extrahepatic biliary ductal dilatation.   No gallstones, gallbladder wall thickening or pericholecystic fluid.    --- End of Report ---            ABRAN HAQ MD; Attending Radiologist  This document has been electronically signed. Jan 9 2023  4:21PM    < end of copied text >

## 2023-01-11 LAB
24R-OH-CALCIDIOL SERPL-MCNC: 36.9 NG/ML — SIGNIFICANT CHANGE UP (ref 30–80)
ALBUMIN SERPL ELPH-MCNC: 2.8 G/DL — LOW (ref 3.3–5)
ALP SERPL-CCNC: 195 U/L — HIGH (ref 40–120)
ALT FLD-CCNC: 778 U/L — HIGH (ref 4–41)
ANION GAP SERPL CALC-SCNC: 10 MMOL/L — SIGNIFICANT CHANGE UP (ref 7–14)
ANTI-RIBONUCLEAR PROTEIN: <0.2 AI — SIGNIFICANT CHANGE UP
AST SERPL-CCNC: 783 U/L — HIGH (ref 4–40)
BASOPHILS # BLD AUTO: 0.02 K/UL — SIGNIFICANT CHANGE UP (ref 0–0.2)
BASOPHILS NFR BLD AUTO: 0.1 % — SIGNIFICANT CHANGE UP (ref 0–2)
BILIRUB SERPL-MCNC: 1.1 MG/DL — SIGNIFICANT CHANGE UP (ref 0.2–1.2)
BUN SERPL-MCNC: 42 MG/DL — HIGH (ref 7–23)
CALCIUM SERPL-MCNC: 8.6 MG/DL — SIGNIFICANT CHANGE UP (ref 8.4–10.5)
CHLORIDE SERPL-SCNC: 98 MMOL/L — SIGNIFICANT CHANGE UP (ref 98–107)
CMV DNA CSF QL NAA+PROBE: 209 — SIGNIFICANT CHANGE UP
CMV DNA SPEC NAA+PROBE-LOG#: 2.32 LOG10IU/ML — HIGH
CO2 SERPL-SCNC: 25 MMOL/L — SIGNIFICANT CHANGE UP (ref 22–31)
CREAT SERPL-MCNC: 1.31 MG/DL — HIGH (ref 0.5–1.3)
CULTURE RESULTS: SIGNIFICANT CHANGE UP
EBV EA AB SER IA-ACNC: <5 U/ML — SIGNIFICANT CHANGE UP
EBV EA AB TITR SER IF: POSITIVE
EBV EA IGG SER-ACNC: NEGATIVE — SIGNIFICANT CHANGE UP
EBV NA IGG SER IA-ACNC: 465 U/ML — HIGH
EBV PATRN SPEC IB-IMP: SIGNIFICANT CHANGE UP
EBV VCA IGG AVIDITY SER QL IA: POSITIVE
EBV VCA IGM SER IA-ACNC: 149 U/ML — HIGH
EBV VCA IGM SER IA-ACNC: <10 U/ML — SIGNIFICANT CHANGE UP
EBV VCA IGM TITR FLD: NEGATIVE — SIGNIFICANT CHANGE UP
EGFR: 59 ML/MIN/1.73M2 — LOW
ENA SM AB FLD QL: <0.2 AI — SIGNIFICANT CHANGE UP
EOSINOPHIL # BLD AUTO: 0 K/UL — SIGNIFICANT CHANGE UP (ref 0–0.5)
EOSINOPHIL NFR BLD AUTO: 0 % — SIGNIFICANT CHANGE UP (ref 0–6)
GLUCOSE SERPL-MCNC: 338 MG/DL — HIGH (ref 70–99)
HCT VFR BLD CALC: 34.3 % — LOW (ref 39–50)
HERPES SIMPLEX VIRUS 1/2 SURVEILLANCE PCR RESULT: SIGNIFICANT CHANGE UP
HERPES SIMPLEX VIRUS 1/2 SURVEILLANCE PCR SOURCE: SIGNIFICANT CHANGE UP
HGB BLD-MCNC: 11 G/DL — LOW (ref 13–17)
HSV1+2 DNA SPEC QL NAA+PROBE: SIGNIFICANT CHANGE UP
IANC: 12.09 K/UL — HIGH (ref 1.8–7.4)
IMM GRANULOCYTES NFR BLD AUTO: 1 % — HIGH (ref 0–0.9)
LYMPHOCYTES # BLD AUTO: 0.53 K/UL — LOW (ref 1–3.3)
LYMPHOCYTES # BLD AUTO: 3.9 % — LOW (ref 13–44)
MAGNESIUM SERPL-MCNC: 2.1 MG/DL — SIGNIFICANT CHANGE UP (ref 1.6–2.6)
MCHC RBC-ENTMCNC: 27.3 PG — SIGNIFICANT CHANGE UP (ref 27–34)
MCHC RBC-ENTMCNC: 32.1 GM/DL — SIGNIFICANT CHANGE UP (ref 32–36)
MCV RBC AUTO: 85.1 FL — SIGNIFICANT CHANGE UP (ref 80–100)
MONOCYTES # BLD AUTO: 0.83 K/UL — SIGNIFICANT CHANGE UP (ref 0–0.9)
MONOCYTES NFR BLD AUTO: 6.1 % — SIGNIFICANT CHANGE UP (ref 2–14)
NEUTROPHILS # BLD AUTO: 12.09 K/UL — HIGH (ref 1.8–7.4)
NEUTROPHILS NFR BLD AUTO: 88.9 % — HIGH (ref 43–77)
NRBC # BLD: 0 /100 WBCS — SIGNIFICANT CHANGE UP (ref 0–0)
NRBC # FLD: 0 K/UL — SIGNIFICANT CHANGE UP (ref 0–0)
ORGANISM # SPEC MICROSCOPIC CNT: SIGNIFICANT CHANGE UP
ORGANISM # SPEC MICROSCOPIC CNT: SIGNIFICANT CHANGE UP
PHOSPHATE SERPL-MCNC: 3.1 MG/DL — SIGNIFICANT CHANGE UP (ref 2.5–4.5)
PLATELET # BLD AUTO: 154 K/UL — SIGNIFICANT CHANGE UP (ref 150–400)
POTASSIUM SERPL-MCNC: 4.3 MMOL/L — SIGNIFICANT CHANGE UP (ref 3.5–5.3)
POTASSIUM SERPL-SCNC: 4.3 MMOL/L — SIGNIFICANT CHANGE UP (ref 3.5–5.3)
PROT SERPL-MCNC: 6 G/DL — SIGNIFICANT CHANGE UP (ref 6–8.3)
RBC # BLD: 4.03 M/UL — LOW (ref 4.2–5.8)
RBC # FLD: 15.5 % — HIGH (ref 10.3–14.5)
SODIUM SERPL-SCNC: 133 MMOL/L — LOW (ref 135–145)
SPECIMEN SOURCE: SIGNIFICANT CHANGE UP
WBC # BLD: 13.6 K/UL — HIGH (ref 3.8–10.5)
WBC # FLD AUTO: 13.6 K/UL — HIGH (ref 3.8–10.5)

## 2023-01-11 PROCEDURE — 99233 SBSQ HOSP IP/OBS HIGH 50: CPT

## 2023-01-11 PROCEDURE — 99232 SBSQ HOSP IP/OBS MODERATE 35: CPT | Mod: GC

## 2023-01-11 PROCEDURE — 99232 SBSQ HOSP IP/OBS MODERATE 35: CPT

## 2023-01-11 RX ORDER — INSULIN GLARGINE 100 [IU]/ML
22 INJECTION, SOLUTION SUBCUTANEOUS
Refills: 0 | Status: DISCONTINUED | OUTPATIENT
Start: 2023-01-11 | End: 2023-01-12

## 2023-01-11 RX ORDER — INSULIN LISPRO 100/ML
8 VIAL (ML) SUBCUTANEOUS
Refills: 0 | Status: DISCONTINUED | OUTPATIENT
Start: 2023-01-11 | End: 2023-01-16

## 2023-01-11 RX ADMIN — CEFTRIAXONE 100 MILLIGRAM(S): 500 INJECTION, POWDER, FOR SOLUTION INTRAMUSCULAR; INTRAVENOUS at 03:00

## 2023-01-11 RX ADMIN — AZITHROMYCIN 255 MILLIGRAM(S): 500 TABLET, FILM COATED ORAL at 03:42

## 2023-01-11 RX ADMIN — Medication 30 MILLIGRAM(S): at 17:42

## 2023-01-11 RX ADMIN — Medication 30 MILLIGRAM(S): at 06:13

## 2023-01-11 RX ADMIN — TACROLIMUS 0.5 MILLIGRAM(S): 5 CAPSULE ORAL at 21:56

## 2023-01-11 RX ADMIN — Medication 6 UNIT(S): at 08:20

## 2023-01-11 RX ADMIN — Medication 1 MILLIGRAM(S): at 12:34

## 2023-01-11 RX ADMIN — Medication 6: at 12:33

## 2023-01-11 RX ADMIN — Medication 2: at 17:41

## 2023-01-11 RX ADMIN — HEPARIN SODIUM 5000 UNIT(S): 5000 INJECTION INTRAVENOUS; SUBCUTANEOUS at 06:12

## 2023-01-11 RX ADMIN — Medication 6: at 08:21

## 2023-01-11 RX ADMIN — INSULIN GLARGINE 22 UNIT(S): 100 INJECTION, SOLUTION SUBCUTANEOUS at 12:33

## 2023-01-11 RX ADMIN — Medication 8 UNIT(S): at 17:41

## 2023-01-11 RX ADMIN — Medication 81 MILLIGRAM(S): at 12:34

## 2023-01-11 RX ADMIN — Medication 8 UNIT(S): at 12:33

## 2023-01-11 RX ADMIN — CARVEDILOL PHOSPHATE 25 MILLIGRAM(S): 80 CAPSULE, EXTENDED RELEASE ORAL at 17:41

## 2023-01-11 RX ADMIN — TACROLIMUS 1 MILLIGRAM(S): 5 CAPSULE ORAL at 12:34

## 2023-01-11 RX ADMIN — Medication 2.5 MILLIGRAM(S): at 06:13

## 2023-01-11 RX ADMIN — Medication 1000 UNIT(S): at 17:40

## 2023-01-11 RX ADMIN — ISOSORBIDE MONONITRATE 30 MILLIGRAM(S): 60 TABLET, EXTENDED RELEASE ORAL at 12:35

## 2023-01-11 RX ADMIN — HEPARIN SODIUM 5000 UNIT(S): 5000 INJECTION INTRAVENOUS; SUBCUTANEOUS at 17:40

## 2023-01-11 RX ADMIN — CARVEDILOL PHOSPHATE 25 MILLIGRAM(S): 80 CAPSULE, EXTENDED RELEASE ORAL at 06:13

## 2023-01-11 RX ADMIN — ATORVASTATIN CALCIUM 20 MILLIGRAM(S): 80 TABLET, FILM COATED ORAL at 21:56

## 2023-01-11 NOTE — PROGRESS NOTE ADULT - SUBJECTIVE AND OBJECTIVE BOX
Dr. Natalee Rodriguez  Pager [phone]    PROGRESS NOTE:     Patient is a 67y old  Male who presents with a chief complaint of sob, flu, pneumonia, sepsis (11 Jan 2023 15:35)      SUBJECTIVE / OVERNIGHT EVENTS: pt reports breathing better  ADDITIONAL REVIEW OF SYSTEMS: denies chest pain    MEDICATIONS  (STANDING):  aspirin  chewable 81 milliGRAM(s) Oral daily  atorvastatin 20 milliGRAM(s) Oral at bedtime  azithromycin  IVPB 500 milliGRAM(s) IV Intermittent every 24 hours  carvedilol 25 milliGRAM(s) Oral every 12 hours  cefTRIAXone   IVPB 1000 milliGRAM(s) IV Intermittent every 24 hours  cholecalciferol 1000 Unit(s) Oral daily  dextrose 5%. 1000 milliLiter(s) (100 mL/Hr) IV Continuous <Continuous>  dextrose 5%. 1000 milliLiter(s) (50 mL/Hr) IV Continuous <Continuous>  dextrose 50% Injectable 25 Gram(s) IV Push once  dextrose 50% Injectable 12.5 Gram(s) IV Push once  dextrose 50% Injectable 25 Gram(s) IV Push once  folic acid 1 milliGRAM(s) Oral daily  glucagon  Injectable 1 milliGRAM(s) IntraMuscular once  heparin   Injectable 5000 Unit(s) SubCutaneous every 12 hours  insulin glargine Injectable (LANTUS) 22 Unit(s) SubCutaneous <User Schedule>  insulin lispro (ADMELOG) corrective regimen sliding scale   SubCutaneous three times a day before meals  insulin lispro (ADMELOG) corrective regimen sliding scale   SubCutaneous at bedtime  insulin lispro Injectable (ADMELOG) 8 Unit(s) SubCutaneous three times a day before meals  isosorbide   mononitrate ER Tablet (IMDUR) 30 milliGRAM(s) Oral daily  oseltamivir 30 milliGRAM(s) Oral two times a day  predniSONE   Tablet 2.5 milliGRAM(s) Oral daily  tacrolimus 0.5 milliGRAM(s) Oral at bedtime  tacrolimus 1 milliGRAM(s) Oral daily    MEDICATIONS  (PRN):  acetaminophen     Tablet .. 650 milliGRAM(s) Oral every 6 hours PRN Temp greater or equal to 38C (100.4F), Mild Pain (1 - 3)  dextrose Oral Gel 15 Gram(s) Oral once PRN Blood Glucose LESS THAN 70 milliGRAM(s)/deciliter      CAPILLARY BLOOD GLUCOSE      POCT Blood Glucose.: 158 mg/dL (11 Jan 2023 17:36)  POCT Blood Glucose.: 265 mg/dL (11 Jan 2023 12:22)  POCT Blood Glucose.: 283 mg/dL (11 Jan 2023 08:12)  POCT Blood Glucose.: 218 mg/dL (10 Sudeep 2023 21:39)    I&O's Summary    11 Jan 2023 07:01  -  11 Jan 2023 18:29  --------------------------------------------------------  IN: 480 mL / OUT: 600 mL / NET: -120 mL        PHYSICAL EXAM:  Vital Signs Last 24 Hrs  T(C): 36.3 (11 Jan 2023 10:30), Max: 36.6 (10 Sudeep 2023 21:50)  T(F): 97.4 (11 Jan 2023 10:30), Max: 97.8 (10 Sudeep 2023 21:50)  HR: 82 (11 Jan 2023 17:30) (67 - 83)  BP: 132/84 (11 Jan 2023 17:30) (132/84 - 154/77)  BP(mean): --  RR: 18 (11 Jan 2023 10:30) (18 - 18)  SpO2: 98% (11 Jan 2023 11:18) (95% - 98%)    Parameters below as of 11 Jan 2023 10:30  Patient On (Oxygen Delivery Method): nasal cannula  O2 Flow (L/min): 3    GENERAL: NAD, increased WOB , on BIPAP  HEAD:  Atraumatic, Normocephalic  EYES: EOMI, PERRLA, conjunctiva and sclera clear  NECK: Supple, No JVD  CHEST/LUNG: right basilar crackle and decreased BS, otherwise clear  HEART: Regular rate and rhythm; No murmurs, rubs, or gallops  ABDOMEN: Soft, Nontender, Nondistended; Bowel sounds present  EXTREMITIES:  2+ Peripheral Pulses, No clubbing, cyanosis, or edema  PSYCH: AAOx3  NEUROLOGY: non-focal, CN grossly intact  SKIN: No rashes or lesions    LABS:                        11.0   13.60 )-----------( 154      ( 11 Jan 2023 06:30 )             34.3     01-11    133<L>  |  98  |  42<H>  ----------------------------<  338<H>  4.3   |  25  |  1.31<H>    Ca    8.6      11 Jan 2023 06:30  Phos  3.1     01-11  Mg     2.10     01-11    TPro  6.0  /  Alb  2.8<L>  /  TBili  1.1  /  DBili  x   /  AST  783<H>  /  ALT  778<H>  /  AlkPhos  195<H>  01-11    PT/INR - ( 10 Sudeep 2023 15:34 )   PT: 17.8 sec;   INR: 1.53 ratio         PTT - ( 10 Sudeep 2023 15:34 )  PTT:30.3 sec          Culture - Blood (collected 10 Sudeep 2023 12:33)  Source: .Blood Blood-Peripheral  Preliminary Report (11 Jan 2023 15:09):    No growth to date.    Culture - Blood (collected 10 Sudeep 2023 12:32)  Source: .Blood Blood-Peripheral  Preliminary Report (11 Jan 2023 15:09):    No growth to date.    Culture - Blood (collected 09 Jan 2023 10:00)  Source: .Blood Blood-Venous  Preliminary Report (10 Sudeep 2023 14:01):    No growth to date.    Culture - Blood (collected 09 Jan 2023 09:50)  Source: .Blood Blood-Peripheral  Gram Stain (10 Sudeep 2023 09:46):    Growth in aerobic bottle: Gram Positive Cocci in Clusters  Final Report (11 Jan 2023 11:33):    Growth in aerobic bottle: Staphylococcus petrasii    Coag Negative Staphylococcus    Single set isolate, possible contaminant. Contact    Microbiology if susceptibility testing clinically    indicated.    ***Blood Panel PCR results on this specimen are available    approximately 3 hours after the Gram stain result.***    Gram stain, PCR, and/or culture results may not always    correspond due to difference in methodologies.    ************************************************************    This PCR assay was performed by multiplex PCR. This    Assay tests for 66 bacterial and resistance gene targets.    Please refer to the [facility] test directory    at [email or web address] for details.  Organism: Blood Culture PCR (11 Jan 2023 11:33)  Organism: Blood Culture PCR (11 Jan 2023 11:33)        RADIOLOGY & ADDITIONAL TESTS:  Results Reviewed:   Imaging Personally Reviewed:  < from: US Kidney and Bladder (01.09.23 @ 17:42) >  IMPRESSION:  Echogenic right native kidney with cortical thinning, suggestive of   medical renal disease. No hydronephrosis.    Nonvisualization of the native left kidney, which may be surgically   absent. Correlate with surgical history.    Right lower quadrant transplant kidney without hydronephrosis.      < from: US Abdomen Upper Quadrant Right (01.09.23 @ 15:43) >  IMPRESSION:  No evidence for intrahepatic or extrahepatic biliary ductal dilatation.   No gallstones, gallbladder wall thickening or pericholecystic fluid.        Electrocardiogram Personally Reviewed:    COORDINATION OF CARE:  Care Discussed with Consultants/Other Providers [Y/N]:  Prior or Outpatient Records Reviewed [Y/N]:

## 2023-01-11 NOTE — PROGRESS NOTE ADULT - PROBLEM SELECTOR PLAN 4
trend LFT daily  hepatitis panel neg  -RUQ Ultrasound showed No evidence for intrahepatic or extrahepatic biliary ductal dilatation. No gallstones, gallbladder wall thickening or pericholecystic fluid. avoid hepatotoxins  -Hepatology consulted, f/u recs  check SHOAIB, ASMA, EBV PCR, CMV PCR, HSV PCR  ordered MRI abdomen with contrast

## 2023-01-11 NOTE — PROGRESS NOTE ADULT - SUBJECTIVE AND OBJECTIVE BOX
Chief Complaint: DM 2 with hyperglycemia     History: Patient seen at bedside. Reports he is eating meals, denies n/v, denies s/s of hypoglycemia  BG trending above target    MEDICATIONS  (STANDING):  aspirin  chewable 81 milliGRAM(s) Oral daily  atorvastatin 20 milliGRAM(s) Oral at bedtime  azithromycin  IVPB 500 milliGRAM(s) IV Intermittent every 24 hours  carvedilol 25 milliGRAM(s) Oral every 12 hours  cefTRIAXone   IVPB 1000 milliGRAM(s) IV Intermittent every 24 hours  cholecalciferol 1000 Unit(s) Oral daily  dextrose 5%. 1000 milliLiter(s) (100 mL/Hr) IV Continuous <Continuous>  dextrose 5%. 1000 milliLiter(s) (50 mL/Hr) IV Continuous <Continuous>  dextrose 50% Injectable 25 Gram(s) IV Push once  dextrose 50% Injectable 12.5 Gram(s) IV Push once  dextrose 50% Injectable 25 Gram(s) IV Push once  folic acid 1 milliGRAM(s) Oral daily  glucagon  Injectable 1 milliGRAM(s) IntraMuscular once  heparin   Injectable 5000 Unit(s) SubCutaneous every 12 hours  insulin glargine Injectable (LANTUS) 22 Unit(s) SubCutaneous <User Schedule>  insulin lispro (ADMELOG) corrective regimen sliding scale   SubCutaneous three times a day before meals  insulin lispro (ADMELOG) corrective regimen sliding scale   SubCutaneous at bedtime  insulin lispro Injectable (ADMELOG) 8 Unit(s) SubCutaneous three times a day before meals  isosorbide   mononitrate ER Tablet (IMDUR) 30 milliGRAM(s) Oral daily  oseltamivir 30 milliGRAM(s) Oral two times a day  predniSONE   Tablet 2.5 milliGRAM(s) Oral daily  tacrolimus 0.5 milliGRAM(s) Oral at bedtime  tacrolimus 1 milliGRAM(s) Oral daily    MEDICATIONS  (PRN):  acetaminophen     Tablet .. 650 milliGRAM(s) Oral every 6 hours PRN Temp greater or equal to 38C (100.4F), Mild Pain (1 - 3)  dextrose Oral Gel 15 Gram(s) Oral once PRN Blood Glucose LESS THAN 70 milliGRAM(s)/deciliter    No Known Allergies    Review of Systems:  Cardiovascular: No chest pain  Respiratory: No SOB  GI: No nausea, vomiting  Endocrine: no hypoglycemia     PHYSICAL EXAM:  VITALS: T(C): 36.3 (01-11-23 @ 10:30)  T(F): 97.4 (01-11-23 @ 10:30), Max: 98.4 (01-10-23 @ 17:50)  HR: 68 (01-11-23 @ 12:30) (67 - 84)  BP: 134/72 (01-11-23 @ 12:30) (134/72 - 154/77)  RR:  (18 - 18)  SpO2:  (95% - 99%)  Wt(kg): --  GENERAL: NAD  EYES: No proptosis, no lid lag, anicteric  HEENT:  Atraumatic, Normocephalic, moist mucous membranes  RESPIRATORY: unlabored respirations     CAPILLARY BLOOD GLUCOSE    POCT Blood Glucose.: 265 mg/dL (11 Jan 2023 12:22)  POCT Blood Glucose.: 283 mg/dL (11 Jan 2023 08:12)  POCT Blood Glucose.: 218 mg/dL (10 Sudeep 2023 21:39)  POCT Blood Glucose.: 247 mg/dL (10 Sudeep 2023 17:56)      01-11    133<L>  |  98  |  42<H>  ----------------------------<  338<H>  4.3   |  25  |  1.31<H>    eGFR: 59<L>    Ca    8.6      01-11  Mg     2.10     01-11  Phos  3.1     01-11    TPro  6.0  /  Alb  2.8<L>  /  TBili  1.1  /  DBili  x   /  AST  783<H>  /  ALT  778<H>  /  AlkPhos  195<H>  01-11      Thyroid Function Tests:  01-09 @ 09:50 TSH 1.42 FreeT4 -- T3 -- Anti TPO -- Anti Thyroglobulin Ab -- TSI --      A1C with Estimated Average Glucose Result: 7.7 % (01-09-23 @ 09:50)    Diet, Regular:   Consistent Carbohydrate No Snacks (CSTCHO)  DASH/TLC Sodium & Cholesterol Restricted (DASH) (01-09-23 @ 09:50) [Active]

## 2023-01-11 NOTE — PROGRESS NOTE ADULT - ASSESSMENT
68 yo male with h/o HTN, DM-2, HLD, CAD w/ 1x NATALIE, CABG in 2005, R kidney transplant in 2015 at [facility] on prednisone, prograf and cellcept, presents to ED with 2 day hx of SOB, found to be flu +, desating requiring bipap    kidney transplant recipient  s/p right kidney transplant 2015 at [facility]   Prograf 1 mg in AM and 0.5 mg in the evening, Prednisone2.5mg BID, and Cellcept 1 gm BID. Recommend to hold MMF while pt has the flu. Had dosage today.   Tacrolimus level 1/10 at goal. Goal 4-7  would hold cellcept right now    acute on ckd  baseline ~ 1.3-1.4  slight IZZY likely sec to prerenal  better today  check UA, urine cr, and urine na  will need optimize dm control    Influenza A  care per team

## 2023-01-11 NOTE — PROGRESS NOTE ADULT - ASSESSMENT
66 yo male with h/o HTN, transplant DM, HLD, CAD w/ 1x NATALIE, CABG in 2005, R kidney transplant in 2015 at [facility] on prednisone, prograf and cellcept, presents to ED with 2 day hx of SOB, a/w cough, +sick contact with family members have flu, denies chest pain/fever/chill. Endocrine consulted for transplant DM with steroid-induced hyperglycemia. He had some mild DKA initially but this resolved quickly. Received methylprednisolone 125mg x1 early morning on 1/9. Now 2.5mg prednisone daily, which is his home dose.    1. Poorly controlled transplant DM with steroid-induced hyperglycemia  DM diagnosis: Transplant DM, diagnosed after transplant around 2015 or 2016  Last A1c: 7.7  Endocrinologist: None  Home DM meds: Lantus 16 units every morning, Novolog 6 units TID (normally takes BID, lunch and dinner), Metformin 500mg daily and Alogliptin 25mg daily (wife assists)    While inpatient:  BG target 100-180 mg/dl  Above target  Increased Lantus to 22 units daily at 12 PM  Increased Admelog to 8 units SQ TID before meals (Hold if NPO/skips meal)   Continue Admelog moderate dose correctional scale before meals and Admelog moderate dose bedtime scale   Fingerstick BG before meals and bedtime  Carbohydrate consistent diet  Hypoglycemia protocol     Discharge Plan:   Likely to discharge patient home on basal/bolus insulin (dose TBD)  Counseled him and his wife that he should be taking rapid acting insulin 3x per day before meals   Lactate elevated and liver enzymes deranged - will most likely STOP Metformin - TBD  Recommend routine outpatient ophthalmology, podiatry and PCP followup     2. HTN  On Carvedilol, Isosorbide   Management per primary team/nephro.    3. HLD  On Atorvastatin 20mg daily - consider holding in setting of elevated LFTs    4. Vitamin D deficiency  Vitamin D 25OH resulted 36.9 WDL  Continue Cholecalciferol 1000 units daily (home med)    Thuy Burns  Nurse Practitioner  Division of Endocrinology & Diabetes  In house pager #[phone]/long range pager #[phone]    If before 9AM or after 6PM, or on weekends/holidays, please call endocrine answering service for assistance ([phone]).  For nonurgent matters email [email or web address] for assistance.

## 2023-01-11 NOTE — PROGRESS NOTE ADULT - PROBLEM SELECTOR PLAN 1
-sepsis and acute hypoxic respiratory failure in s/o influenza and RLL pneumonia,   -on admission WBC 14.9, elevated lactate 6 -->3.4 after fluid bolus, no CO2 retention on VBG, on admission pH 7.34, pCO2 36  - off bipap, weaned to NC 3L , wean as tolerated  -given ceftriaxone/zithromax, solumedrol 125mg, neb, 1 liter of fluid bolus in ED  -c/w ceftriaxone/zithromax  -tamiflu 75mg x1 then 30mg bid x5 day course  -f/u urine legionella and strep  -leg doppler (d-dimer 1950, although no leg edema and no chest pain) showed- No evidence of deep vein thrombosis in the right and left lower extremities.  -Blood culture showed staph epi, likely contaminant  -f/u ID, dc'd vanco

## 2023-01-11 NOTE — PROGRESS NOTE ADULT - ATTENDING COMMENTS
Renal transplant recipient now with influenza. Liver tests remain elevated and await evaluation results. Patient feels improved. mental status clear.

## 2023-01-11 NOTE — PROGRESS NOTE ADULT - ASSESSMENT
66yo M with PMH of HTN, DM2, HLD, CAD w/ 1x NATALIE, CABG in 2005, R kidney transplant in 2015 on prednisone, prograf and cellcept who presents with URI symptoms. Hepatology consulted for elevated liver tests.    # Elevated liver tests - Mostly hepatocellular, uptrending. No INR. Noted elevated pro-BNP. DDx includesviral hepatitis in an immunocompromised patient vs. sepsis related vs. DILI.   # Influenza  # Renal transplant  # CAD    Recommendations:  - Monitor CMP, CBC, coags  - Please check SHOAIB, ASMA, EBV PCR, CMV PCR, HSV PCR  - MRI abdomen with IV contrast  - Rest of care per primary team    Please note that the recommendations are not final until attested by an attending.    Thank you for involving us in the care of this patient. Please reach out if any further questions.    Irving Stewart, PGY-6  Gastroenterology/Hepatology Fellow    Available on [employer] Teams  After 5PM/Weekends, please contact the on-call GI fellow: [phone]

## 2023-01-11 NOTE — PROGRESS NOTE ADULT - PROBLEM SELECTOR PLAN 2
-hx right renal transplant in 2015, pt with hyponatremia Na 130, bun/cr 28/1.72, met acidosis hco3 18, baseline creat appears to be 1.5-1.6 from Dec 2020  at home he takes tacrolimus 1mg am and 0.5mg pm, cellcept 1000mg bid, prednisone 2.5mg qd  -given a liter of fluid bolus in ED  -f/u nephrology, cellcept held in s/o infection  -tacrolimus level 4.5 wnl  -daily BMP, Mg, phos  -avoid nephrotoxins  -Renal sono showed Echogenic right native kidney with cortical thinning, suggestive of medical renal disease. No hydronephrosis. Nonvisualization of the native left kidney, which may be surgically absent. Correlate with surgical history. Right lower quadrant transplant kidney without hydronephrosis.

## 2023-01-11 NOTE — PROGRESS NOTE ADULT - ASSESSMENT
68 yo male with h/o HTN, DM-2, HLD, CAD w/ 1x NATALIE, CABG in 2005, R kidney transplant in 2015 at [facility] on prednisone, prograf and cellcept, presents to ED with 2 day hx of SOB, cough, hypoxia, found to have influenza and RLL pneumonia

## 2023-01-11 NOTE — PROGRESS NOTE ADULT - SUBJECTIVE AND OBJECTIVE BOX
Interval Events:   - reports feeling better, less SOB  - TTE done    Allergies:  No Known Allergies        Hospital Medications:  acetaminophen     Tablet .. 650 milliGRAM(s) Oral every 6 hours PRN  aspirin  chewable 81 milliGRAM(s) Oral daily  atorvastatin 20 milliGRAM(s) Oral at bedtime  azithromycin  IVPB 500 milliGRAM(s) IV Intermittent every 24 hours  carvedilol 25 milliGRAM(s) Oral every 12 hours  cefTRIAXone   IVPB 1000 milliGRAM(s) IV Intermittent every 24 hours  cholecalciferol 1000 Unit(s) Oral daily  dextrose 5%. 1000 milliLiter(s) IV Continuous <Continuous>  dextrose 5%. 1000 milliLiter(s) IV Continuous <Continuous>  dextrose 50% Injectable 25 Gram(s) IV Push once  dextrose 50% Injectable 12.5 Gram(s) IV Push once  dextrose 50% Injectable 25 Gram(s) IV Push once  dextrose Oral Gel 15 Gram(s) Oral once PRN  folic acid 1 milliGRAM(s) Oral daily  glucagon  Injectable 1 milliGRAM(s) IntraMuscular once  heparin   Injectable 5000 Unit(s) SubCutaneous every 12 hours  insulin glargine Injectable (LANTUS) 22 Unit(s) SubCutaneous <User Schedule>  insulin lispro (ADMELOG) corrective regimen sliding scale   SubCutaneous three times a day before meals  insulin lispro (ADMELOG) corrective regimen sliding scale   SubCutaneous at bedtime  insulin lispro Injectable (ADMELOG) 8 Unit(s) SubCutaneous three times a day before meals  isosorbide   mononitrate ER Tablet (IMDUR) 30 milliGRAM(s) Oral daily  oseltamivir 30 milliGRAM(s) Oral two times a day  predniSONE   Tablet 2.5 milliGRAM(s) Oral daily  tacrolimus 0.5 milliGRAM(s) Oral at bedtime  tacrolimus 1 milliGRAM(s) Oral daily      PMHX/PSHX:  CAD (coronary artery disease)    DM (diabetes mellitus)    HTN (hypertension)    High cholesterol    S/P triple vessel bypass    S/P kidney transplant        Family history:  No pertinent family history in first degree relatives    FH: HTN (hypertension)        ROS: As per HPI, otherwise 14-point ROS reviewed and negative.      PHYSICAL EXAM:   Vital Signs:  Vital Signs Last 24 Hrs  T(C): 36.3 (11 Jan 2023 10:30), Max: 36.9 (10 Sudeep 2023 17:50)  T(F): 97.4 (11 Jan 2023 10:30), Max: 98.4 (10 Sudeep 2023 17:50)  HR: 68 (11 Jan 2023 12:30) (67 - 84)  BP: 134/72 (11 Jan 2023 12:30) (134/72 - 154/77)  BP(mean): --  RR: 18 (11 Jan 2023 10:30) (18 - 27)  SpO2: 98% (11 Jan 2023 10:30) (95% - 99%)    Parameters below as of 11 Jan 2023 10:30  Patient On (Oxygen Delivery Method): nasal cannula  O2 Flow (L/min): 3    Daily     Daily         GENERAL:  No acute distress  HEENT:  Normocephalic/atraumtic,  no scleral icterus  CHEST:  No accessory muscle use  HEART:  Regular rate and rhythm  ABDOMEN:  Soft, non-tender, non-distended  EXTREMITIES:  No cyanosis, clubbing, or edema  SKIN:  No rash  NEURO:  Alert and oriented x 3, no asterixis, no tremor    LABS:                        11.0   13.60 )-----------( 154      ( 11 Jan 2023 06:30 )             34.3     Mean Cell Volume: 85.1 fL (01-11-23 @ 06:30)    01-11    133<L>  |  98  |  42<H>  ----------------------------<  338<H>  4.3   |  25  |  1.31<H>    Ca    8.6      11 Jan 2023 06:30  Phos  3.1     01-11  Mg     2.10     01-11    TPro  6.0  /  Alb  2.8<L>  /  TBili  1.1  /  DBili  x   /  AST  783<H>  /  ALT  778<H>  /  AlkPhos  195<H>  01-11    LIVER FUNCTIONS - ( 11 Jan 2023 06:30 )  Alb: 2.8 g/dL / Pro: 6.0 g/dL / ALK PHOS: 195 U/L / ALT: 778 U/L / AST: 783 U/L / GGT: x           PT/INR - ( 10 Sudeep 2023 15:34 )   PT: 17.8 sec;   INR: 1.53 ratio         PTT - ( 10 Sudeep 2023 15:34 )  PTT:30.3 sec                            11.0   13.60 )-----------( 154      ( 11 Jan 2023 06:30 )             34.3                         11.7   14.41 )-----------( 153      ( 10 Sudeep 2023 06:00 )             37.1                         12.2   17.35 )-----------( 173      ( 09 Jan 2023 09:50 )             37.5                         11.7   14.95 )-----------( 169      ( 09 Jan 2023 01:14 )             36.6       Imaging:

## 2023-01-11 NOTE — PROGRESS NOTE ADULT - SUBJECTIVE AND OBJECTIVE BOX
[facility]   MD HUNTER BONILLA MD KRISTINE SOLTANPOUR, DO ANGELA WONG, PA    TEL:  OFFICE: [phone]  From 5pm-7am Answering Service [phone]    -- RENAL FOLLOW UP NOTE ---Date of Service 01-11-23 @ 13:57    Patient is a 67y old  Male who presents with a chief complaint of sob, flu, pneumonia, sepsis (11 Jan 2023 12:58)      Patient seen and examined at bedside. sob improving    VITALS:  T(F): 97.4 (01-11-23 @ 10:30), Max: 98.4 (01-10-23 @ 17:50)  HR: 68 (01-11-23 @ 12:30)  BP: 134/72 (01-11-23 @ 12:30)  RR: 18 (01-11-23 @ 10:30)  SpO2: 98% (01-11-23 @ 10:30)  Wt(kg): --        PHYSICAL EXAM:  General: NAD  Neck: No JVD  Respiratory: rhonchi wheeze  Cardiovascular: S1, S2, RRR  Gastrointestinal: BS+, soft, NT/ND  Extremities: No peripheral edema    Hospital Medications:   MEDICATIONS  (STANDING):  aspirin  chewable 81 milliGRAM(s) Oral daily  atorvastatin 20 milliGRAM(s) Oral at bedtime  azithromycin  IVPB 500 milliGRAM(s) IV Intermittent every 24 hours  carvedilol 25 milliGRAM(s) Oral every 12 hours  cefTRIAXone   IVPB 1000 milliGRAM(s) IV Intermittent every 24 hours  cholecalciferol 1000 Unit(s) Oral daily  dextrose 5%. 1000 milliLiter(s) (100 mL/Hr) IV Continuous <Continuous>  dextrose 5%. 1000 milliLiter(s) (50 mL/Hr) IV Continuous <Continuous>  dextrose 50% Injectable 25 Gram(s) IV Push once  dextrose 50% Injectable 12.5 Gram(s) IV Push once  dextrose 50% Injectable 25 Gram(s) IV Push once  folic acid 1 milliGRAM(s) Oral daily  glucagon  Injectable 1 milliGRAM(s) IntraMuscular once  heparin   Injectable 5000 Unit(s) SubCutaneous every 12 hours  insulin glargine Injectable (LANTUS) 22 Unit(s) SubCutaneous <User Schedule>  insulin lispro (ADMELOG) corrective regimen sliding scale   SubCutaneous three times a day before meals  insulin lispro (ADMELOG) corrective regimen sliding scale   SubCutaneous at bedtime  insulin lispro Injectable (ADMELOG) 8 Unit(s) SubCutaneous three times a day before meals  isosorbide   mononitrate ER Tablet (IMDUR) 30 milliGRAM(s) Oral daily  oseltamivir 30 milliGRAM(s) Oral two times a day  predniSONE   Tablet 2.5 milliGRAM(s) Oral daily  tacrolimus 0.5 milliGRAM(s) Oral at bedtime  tacrolimus 1 milliGRAM(s) Oral daily      LABS:  01-11    133<L>  |  98  |  42<H>  ----------------------------<  338<H>  4.3   |  25  |  1.31<H>    Ca    8.6      11 Jan 2023 06:30  Phos  3.1     01-11  Mg     2.10     01-11    TPro  6.0  /  Alb  2.8<L>  /  TBili  1.1  /  DBili      /  AST  783<H>  /  ALT  778<H>  /  AlkPhos  195<H>  01-11    Creatinine Trend: 1.31 <--, 1.54 <--, 1.41 <--, 1.59 <--, 1.72 <--    Albumin, Serum: 2.8 g/dL (01-11 @ 06:30)  Phosphorus Level, Serum: 3.1 mg/dL (01-11 @ 06:30)  Vitamin D, 25-Hydroxy: 36.9 ng/mL (01-11 @ 06:30)                              11.0   13.60 )-----------( 154      ( 11 Jan 2023 06:30 )             34.3     Urine Studies:      Vitamin D (25OH) 36.9      [01-11-23 @ 06:30]  TSH 1.42      [01-09-23 @ 09:50]    HBsAg Nonreact      [01-09-23 @ 09:50]  HCV 0.17, Nonreact      [01-10-23 @ 06:00]      RADIOLOGY & ADDITIONAL STUDIES:

## 2023-01-11 NOTE — PROGRESS NOTE ADULT - PROBLEM SELECTOR PLAN 3
-hx CAD with stent x1,  elevated probnp 5238, no chest pain  -c/w asa/statin, coreg, imdur  -monitor bp  -echo mild LV dysfunction, EF 47%, mild MR   although clinically doesn't appear to be in chf, no peripheral edema, no pulmonary congestion on cxr; pt also denies hx chf

## 2023-01-12 DIAGNOSIS — R76.8 OTHER SPECIFIED ABNORMAL IMMUNOLOGICAL FINDINGS IN SERUM: ICD-10-CM

## 2023-01-12 DIAGNOSIS — J96.01 ACUTE RESPIRATORY FAILURE WITH HYPOXIA: ICD-10-CM

## 2023-01-12 LAB
ALBUMIN SERPL ELPH-MCNC: 3 G/DL — LOW (ref 3.3–5)
ALP SERPL-CCNC: 208 U/L — HIGH (ref 40–120)
ALT FLD-CCNC: 559 U/L — HIGH (ref 4–41)
ANA TITR SER: NEGATIVE — SIGNIFICANT CHANGE UP
ANION GAP SERPL CALC-SCNC: 11 MMOL/L — SIGNIFICANT CHANGE UP (ref 7–14)
APTT BLD: 29.4 SEC — SIGNIFICANT CHANGE UP (ref 27–36.3)
AST SERPL-CCNC: 254 U/L — HIGH (ref 4–40)
BASOPHILS # BLD AUTO: 0.04 K/UL — SIGNIFICANT CHANGE UP (ref 0–0.2)
BASOPHILS NFR BLD AUTO: 0.3 % — SIGNIFICANT CHANGE UP (ref 0–2)
BILIRUB SERPL-MCNC: 1 MG/DL — SIGNIFICANT CHANGE UP (ref 0.2–1.2)
BUN SERPL-MCNC: 37 MG/DL — HIGH (ref 7–23)
CALCIUM SERPL-MCNC: 8.7 MG/DL — SIGNIFICANT CHANGE UP (ref 8.4–10.5)
CHLORIDE SERPL-SCNC: 96 MMOL/L — LOW (ref 98–107)
CMV DNA CSF QL NAA+PROBE: 167 — SIGNIFICANT CHANGE UP
CMV DNA SPEC NAA+PROBE-LOG#: 2.22 LOG10IU/ML — HIGH
CO2 SERPL-SCNC: 25 MMOL/L — SIGNIFICANT CHANGE UP (ref 22–31)
CREAT SERPL-MCNC: 1.29 MG/DL — SIGNIFICANT CHANGE UP (ref 0.5–1.3)
EGFR: 60 ML/MIN/1.73M2 — SIGNIFICANT CHANGE UP
EOSINOPHIL # BLD AUTO: 0.04 K/UL — SIGNIFICANT CHANGE UP (ref 0–0.5)
EOSINOPHIL NFR BLD AUTO: 0.3 % — SIGNIFICANT CHANGE UP (ref 0–6)
GLUCOSE BLDC GLUCOMTR-MCNC: 148 MG/DL — HIGH (ref 70–99)
GLUCOSE BLDC GLUCOMTR-MCNC: 163 MG/DL — HIGH (ref 70–99)
GLUCOSE BLDC GLUCOMTR-MCNC: 174 MG/DL — HIGH (ref 70–99)
GLUCOSE SERPL-MCNC: 257 MG/DL — HIGH (ref 70–99)
HCT VFR BLD CALC: 36.5 % — LOW (ref 39–50)
HGB BLD-MCNC: 11.9 G/DL — LOW (ref 13–17)
HSV DNA1: SIGNIFICANT CHANGE UP
HSV DNA2: SIGNIFICANT CHANGE UP
HSV1 DNA BLD QL NAA+PROBE: SIGNIFICANT CHANGE UP
HSV2 DNA BLD QL NAA+PROBE: SIGNIFICANT CHANGE UP
IANC: 10.23 K/UL — HIGH (ref 1.8–7.4)
IMM GRANULOCYTES NFR BLD AUTO: 1.6 % — HIGH (ref 0–0.9)
INR BLD: 1.4 RATIO — HIGH (ref 0.88–1.16)
LYMPHOCYTES # BLD AUTO: 0.78 K/UL — LOW (ref 1–3.3)
LYMPHOCYTES # BLD AUTO: 6.3 % — LOW (ref 13–44)
MAGNESIUM SERPL-MCNC: 1.8 MG/DL — SIGNIFICANT CHANGE UP (ref 1.6–2.6)
MCHC RBC-ENTMCNC: 27.5 PG — SIGNIFICANT CHANGE UP (ref 27–34)
MCHC RBC-ENTMCNC: 32.6 GM/DL — SIGNIFICANT CHANGE UP (ref 32–36)
MCV RBC AUTO: 84.5 FL — SIGNIFICANT CHANGE UP (ref 80–100)
MONOCYTES # BLD AUTO: 1.08 K/UL — HIGH (ref 0–0.9)
MONOCYTES NFR BLD AUTO: 8.7 % — SIGNIFICANT CHANGE UP (ref 2–14)
NEUTROPHILS # BLD AUTO: 10.23 K/UL — HIGH (ref 1.8–7.4)
NEUTROPHILS NFR BLD AUTO: 82.8 % — HIGH (ref 43–77)
NRBC # BLD: 0 /100 WBCS — SIGNIFICANT CHANGE UP (ref 0–0)
NRBC # FLD: 0 K/UL — SIGNIFICANT CHANGE UP (ref 0–0)
PHOSPHATE SERPL-MCNC: 2.3 MG/DL — LOW (ref 2.5–4.5)
PLATELET # BLD AUTO: 174 K/UL — SIGNIFICANT CHANGE UP (ref 150–400)
POTASSIUM SERPL-MCNC: 4.1 MMOL/L — SIGNIFICANT CHANGE UP (ref 3.5–5.3)
POTASSIUM SERPL-SCNC: 4.1 MMOL/L — SIGNIFICANT CHANGE UP (ref 3.5–5.3)
PROT SERPL-MCNC: 6.1 G/DL — SIGNIFICANT CHANGE UP (ref 6–8.3)
PROTHROM AB SERPL-ACNC: 16.3 SEC — HIGH (ref 10.5–13.4)
RBC # BLD: 4.32 M/UL — SIGNIFICANT CHANGE UP (ref 4.2–5.8)
RBC # FLD: 15.3 % — HIGH (ref 10.3–14.5)
S PNEUM AG UR QL: NEGATIVE — SIGNIFICANT CHANGE UP
SODIUM SERPL-SCNC: 132 MMOL/L — LOW (ref 135–145)
WBC # BLD: 12.37 K/UL — HIGH (ref 3.8–10.5)
WBC # FLD AUTO: 12.37 K/UL — HIGH (ref 3.8–10.5)

## 2023-01-12 PROCEDURE — 99233 SBSQ HOSP IP/OBS HIGH 50: CPT

## 2023-01-12 PROCEDURE — 99232 SBSQ HOSP IP/OBS MODERATE 35: CPT

## 2023-01-12 PROCEDURE — 99232 SBSQ HOSP IP/OBS MODERATE 35: CPT | Mod: GC

## 2023-01-12 RX ORDER — SODIUM,POTASSIUM PHOSPHATES 278-250MG
1 POWDER IN PACKET (EA) ORAL ONCE
Refills: 0 | Status: COMPLETED | OUTPATIENT
Start: 2023-01-12 | End: 2023-01-12

## 2023-01-12 RX ORDER — INSULIN GLARGINE 100 [IU]/ML
24 INJECTION, SOLUTION SUBCUTANEOUS
Refills: 0 | Status: DISCONTINUED | OUTPATIENT
Start: 2023-01-13 | End: 2023-01-16

## 2023-01-12 RX ORDER — VALGANCICLOVIR 450 MG/1
450 TABLET, FILM COATED ORAL
Refills: 0 | Status: DISCONTINUED | OUTPATIENT
Start: 2023-01-12 | End: 2023-01-16

## 2023-01-12 RX ADMIN — Medication 2.5 MILLIGRAM(S): at 04:06

## 2023-01-12 RX ADMIN — ISOSORBIDE MONONITRATE 30 MILLIGRAM(S): 60 TABLET, EXTENDED RELEASE ORAL at 12:33

## 2023-01-12 RX ADMIN — AZITHROMYCIN 255 MILLIGRAM(S): 500 TABLET, FILM COATED ORAL at 04:05

## 2023-01-12 RX ADMIN — Medication 8 UNIT(S): at 17:47

## 2023-01-12 RX ADMIN — CARVEDILOL PHOSPHATE 25 MILLIGRAM(S): 80 CAPSULE, EXTENDED RELEASE ORAL at 17:47

## 2023-01-12 RX ADMIN — CEFTRIAXONE 100 MILLIGRAM(S): 500 INJECTION, POWDER, FOR SOLUTION INTRAMUSCULAR; INTRAVENOUS at 03:18

## 2023-01-12 RX ADMIN — Medication 4: at 08:25

## 2023-01-12 RX ADMIN — Medication 30 MILLIGRAM(S): at 17:46

## 2023-01-12 RX ADMIN — TACROLIMUS 0.5 MILLIGRAM(S): 5 CAPSULE ORAL at 21:34

## 2023-01-12 RX ADMIN — Medication 1 MILLIGRAM(S): at 12:33

## 2023-01-12 RX ADMIN — Medication 8 UNIT(S): at 08:25

## 2023-01-12 RX ADMIN — Medication 1 PACKET(S): at 06:18

## 2023-01-12 RX ADMIN — HEPARIN SODIUM 5000 UNIT(S): 5000 INJECTION INTRAVENOUS; SUBCUTANEOUS at 04:03

## 2023-01-12 RX ADMIN — Medication 81 MILLIGRAM(S): at 12:32

## 2023-01-12 RX ADMIN — Medication 8 UNIT(S): at 12:33

## 2023-01-12 RX ADMIN — INSULIN GLARGINE 22 UNIT(S): 100 INJECTION, SOLUTION SUBCUTANEOUS at 12:34

## 2023-01-12 RX ADMIN — Medication 1000 UNIT(S): at 12:32

## 2023-01-12 RX ADMIN — TACROLIMUS 1 MILLIGRAM(S): 5 CAPSULE ORAL at 12:32

## 2023-01-12 RX ADMIN — HEPARIN SODIUM 5000 UNIT(S): 5000 INJECTION INTRAVENOUS; SUBCUTANEOUS at 17:50

## 2023-01-12 RX ADMIN — CARVEDILOL PHOSPHATE 25 MILLIGRAM(S): 80 CAPSULE, EXTENDED RELEASE ORAL at 04:04

## 2023-01-12 RX ADMIN — VALGANCICLOVIR 450 MILLIGRAM(S): 450 TABLET, FILM COATED ORAL at 17:46

## 2023-01-12 RX ADMIN — Medication 30 MILLIGRAM(S): at 04:05

## 2023-01-12 RX ADMIN — ATORVASTATIN CALCIUM 20 MILLIGRAM(S): 80 TABLET, FILM COATED ORAL at 21:34

## 2023-01-12 RX ADMIN — Medication 2: at 12:33

## 2023-01-12 NOTE — PROGRESS NOTE ADULT - SUBJECTIVE AND OBJECTIVE BOX
PROGRESS NOTE:     Patient is a 67y old  Male who presents with a chief complaint of sob, flu, pneumonia, sepsis (12 Jan 2023 13:07)      SUBJECTIVE / OVERNIGHT EVENTS: Shortness of breath improving.     ADDITIONAL REVIEW OF SYSTEMS:    MEDICATIONS  (STANDING):  aspirin  chewable 81 milliGRAM(s) Oral daily  atorvastatin 20 milliGRAM(s) Oral at bedtime  azithromycin  IVPB 500 milliGRAM(s) IV Intermittent every 24 hours  carvedilol 25 milliGRAM(s) Oral every 12 hours  cefTRIAXone   IVPB 1000 milliGRAM(s) IV Intermittent every 24 hours  cholecalciferol 1000 Unit(s) Oral daily  dextrose 5%. 1000 milliLiter(s) (100 mL/Hr) IV Continuous <Continuous>  dextrose 5%. 1000 milliLiter(s) (50 mL/Hr) IV Continuous <Continuous>  dextrose 50% Injectable 25 Gram(s) IV Push once  dextrose 50% Injectable 12.5 Gram(s) IV Push once  dextrose 50% Injectable 25 Gram(s) IV Push once  folic acid 1 milliGRAM(s) Oral daily  glucagon  Injectable 1 milliGRAM(s) IntraMuscular once  heparin   Injectable 5000 Unit(s) SubCutaneous every 12 hours  insulin glargine Injectable (LANTUS) 22 Unit(s) SubCutaneous <User Schedule>  insulin lispro (ADMELOG) corrective regimen sliding scale   SubCutaneous three times a day before meals  insulin lispro (ADMELOG) corrective regimen sliding scale   SubCutaneous at bedtime  insulin lispro Injectable (ADMELOG) 8 Unit(s) SubCutaneous three times a day before meals  isosorbide   mononitrate ER Tablet (IMDUR) 30 milliGRAM(s) Oral daily  oseltamivir 30 milliGRAM(s) Oral two times a day  predniSONE   Tablet 2.5 milliGRAM(s) Oral daily  tacrolimus 0.5 milliGRAM(s) Oral at bedtime  tacrolimus 1 milliGRAM(s) Oral daily    MEDICATIONS  (PRN):  acetaminophen     Tablet .. 650 milliGRAM(s) Oral every 6 hours PRN Temp greater or equal to 38C (100.4F), Mild Pain (1 - 3)  dextrose Oral Gel 15 Gram(s) Oral once PRN Blood Glucose LESS THAN 70 milliGRAM(s)/deciliter      CAPILLARY BLOOD GLUCOSE      POCT Blood Glucose.: 174 mg/dL (12 Jan 2023 12:22)  POCT Blood Glucose.: 224 mg/dL (12 Jan 2023 08:21)  POCT Blood Glucose.: 127 mg/dL (11 Jan 2023 21:32)  POCT Blood Glucose.: 158 mg/dL (11 Jan 2023 17:36)    I&O's Summary    11 Jan 2023 07:01  -  12 Jan 2023 07:00  --------------------------------------------------------  IN: 480 mL / OUT: 600 mL / NET: -120 mL        PHYSICAL EXAM:  Vital Signs Last 24 Hrs  T(C): 36.3 (12 Jan 2023 10:30), Max: 36.8 (11 Jan 2023 21:15)  T(F): 97.4 (12 Jan 2023 10:30), Max: 98.3 (11 Jan 2023 21:15)  HR: 70 (12 Jan 2023 12:30) (70 - 82)  BP: 146/64 (12 Jan 2023 12:30) (132/84 - 146/64)  BP(mean): --  RR: 18 (12 Jan 2023 10:30) (18 - 18)  SpO2: 97% (12 Jan 2023 11:05) (97% - 100%)    Parameters below as of 12 Jan 2023 10:30  Patient On (Oxygen Delivery Method): nasal cannula  O2 Flow (L/min): 4      GENERAL: NAD  EYES: EOMI, PERRLA, conjunctiva and sclera clear  NECK: Supple, No JVD  CHEST/LUNG: right basilar crackle and decreased BS, otherwise clear  HEART: Regular rate and rhythm; No murmurs, rubs, or gallops  ABDOMEN: Soft, Nontender, Nondistended; Bowel sounds present  EXTREMITIES:  2+ Peripheral Pulses, No clubbing, cyanosis, or edema  PSYCH: AAOx3  NEUROLOGY: non-focal, CN grossly intact  SKIN: No rashes or lesions    LABS:                        11.9   12.37 )-----------( 174      ( 12 Jan 2023 05:00 )             36.5     01-12    132<L>  |  96<L>  |  37<H>  ----------------------------<  257<H>  4.1   |  25  |  1.29    Ca    8.7      12 Jan 2023 05:00  Phos  2.3     01-12  Mg     1.80     01-12    TPro  6.1  /  Alb  3.0<L>  /  TBili  1.0  /  DBili  x   /  AST  254<H>  /  ALT  559<H>  /  AlkPhos  208<H>  01-12    PT/INR - ( 12 Jan 2023 05:00 )   PT: 16.3 sec;   INR: 1.40 ratio         PTT - ( 12 Jan 2023 05:00 )  PTT:29.4 sec          Culture - Nose (collected 10 Sudeep 2023 15:56)  Source: .Nose  Final Report (12 Jan 2023 10:33):    No staphylococcus aureus isolated.    "PCR is more Sensitive for identifying MRSA/MSSA."    Culture - Blood (collected 10 Sudeep 2023 12:33)  Source: .Blood Blood-Peripheral  Preliminary Report (11 Jan 2023 15:09):    No growth to date.    Culture - Blood (collected 10 Sudeep 2023 12:32)  Source: .Blood Blood-Peripheral  Preliminary Report (11 Jan 2023 15:09):    No growth to date.        RADIOLOGY & ADDITIONAL TESTS:  Results Reviewed:   Imaging Personally Reviewed:  Electrocardiogram Personally Reviewed:    COORDINATION OF CARE:  Care Discussed with Consultants/Other Providers [Y/N]:  Prior or Outpatient Records Reviewed [Y/N]:

## 2023-01-12 NOTE — PROGRESS NOTE ADULT - PROBLEM SELECTOR PLAN 5
c/w Coreg and Imdur   Monitor BP -improving, suspect DILI   -hepatitis panel neg  -RUQ Ultrasound showed no evidence for intrahepatic or extrahepatic biliary ductal dilatation. No gallstones, gallbladder wall thickening or pericholecystic fluid. avoid hepatotoxins  -trend LFT daily  -Hepatology following   -f/up SHOAIB, ASMA, EBV PCR, CMV PCR  -awaiting MRI abdomen with contrast -hx CAD with stent x1, no chest pain  -c/w asa/statin, coreg, imdur  -monitor bp  -echo mild LV dysfunction, EF 47%, mild MR

## 2023-01-12 NOTE — PROGRESS NOTE ADULT - SUBJECTIVE AND OBJECTIVE BOX
Follow Up: Flu    Interval History/ROS: Feels much better. Still some cough. No pain. No diarrhea or dysuria. Appetite isn't great but he's eating. No vomiting.     Allergies  No Known Allergies        ANTIMICROBIALS:  azithromycin  IVPB 500 every 24 hours  cefTRIAXone   IVPB 1000 every 24 hours  oseltamivir 30 two times a day  valGANciclovir 450 two times a day      OTHER MEDS:  acetaminophen     Tablet .. 650 milliGRAM(s) Oral every 6 hours PRN  aspirin  chewable 81 milliGRAM(s) Oral daily  atorvastatin 20 milliGRAM(s) Oral at bedtime  carvedilol 25 milliGRAM(s) Oral every 12 hours  cholecalciferol 1000 Unit(s) Oral daily  dextrose 5%. 1000 milliLiter(s) IV Continuous <Continuous>  dextrose 5%. 1000 milliLiter(s) IV Continuous <Continuous>  dextrose 50% Injectable 25 Gram(s) IV Push once  dextrose 50% Injectable 12.5 Gram(s) IV Push once  dextrose 50% Injectable 25 Gram(s) IV Push once  dextrose Oral Gel 15 Gram(s) Oral once PRN  folic acid 1 milliGRAM(s) Oral daily  glucagon  Injectable 1 milliGRAM(s) IntraMuscular once  heparin   Injectable 5000 Unit(s) SubCutaneous every 12 hours  insulin lispro (ADMELOG) corrective regimen sliding scale   SubCutaneous three times a day before meals  insulin lispro (ADMELOG) corrective regimen sliding scale   SubCutaneous at bedtime  insulin lispro Injectable (ADMELOG) 8 Unit(s) SubCutaneous three times a day before meals  isosorbide   mononitrate ER Tablet (IMDUR) 30 milliGRAM(s) Oral daily  predniSONE   Tablet 2.5 milliGRAM(s) Oral daily  tacrolimus 0.5 milliGRAM(s) Oral at bedtime  tacrolimus 1 milliGRAM(s) Oral daily      Vital Signs Last 24 Hrs  T(C): 36.3 (2023 10:30), Max: 36.8 (2023 21:15)  T(F): 97.4 (2023 10:30), Max: 98.3 (2023 21:15)  HR: 76 (2023 14:26) (70 - 82)  BP: 146/64 (2023 12:30) (132/84 - 146/64)  BP(mean): --  RR: 18 (2023 10:30) (18 - 18)  SpO2: 96% (2023 14:26) (96% - 100%)    Parameters below as of 2023 10:30  Patient On (Oxygen Delivery Method): nasal cannula  O2 Flow (L/min): 4      Physical Exam:  General: non toxic  Cardio: regular rate   Respiratory: nonlabored on nasal cannula  abd: nondistended, soft, nontender   Musculoskeletal: no focal joint swelling  vascular: AVF no phlebitis   Skin: no rash                          11.9   12.37 )-----------( 174      ( 2023 05:00 )             36.5           132<L>  |  96<L>  |  37<H>  ----------------------------<  257<H>  4.1   |  25  |  1.29    Ca    8.7      2023 05:00  Phos  2.3       Mg     1.80         TPro  6.1  /  Alb  3.0<L>  /  TBili  1.0  /  DBili  x   /  AST  254<H>  /  ALT  559<H>  /  AlkPhos  208<H>            MICROBIOLOGY:  Culture - Legionella (collected 23 @ 11:46)  Source: .Legionella  Preliminary Report (23 @ 16:36):    Culture in progress    Culture - Nose (collected 01-10-23 @ 15:56)  Source: .Nose  Final Report (23 @ 10:33):    No staphylococcus aureus isolated.    "PCR is more Sensitive for identifying MRSA/MSSA."    Culture - Blood (collected 01-10-23 @ 12:33)  Source: .Blood Blood-Peripheral  Preliminary Report (23 @ 15:09):    No growth to date.    Culture - Blood (collected 01-10-23 @ 12:32)  Source: .Blood Blood-Peripheral  Preliminary Report (23 @ 15:09):    No growth to date.    Culture - Blood (collected 23 @ 10:00)  Source: .Blood Blood-Venous  Preliminary Report (01-10-23 @ 14:01):    No growth to date.    Culture - Blood (collected 23 @ 09:50)  Source: .Blood Blood-Peripheral  Gram Stain (01-10-23 @ 09:46):    Growth in aerobic bottle: Gram Positive Cocci in Clusters  Final Report (23 @ 11:33):    Growth in aerobic bottle: Staphylococcus petrasii    Coag Negative Staphylococcus    Single set isolate, possible contaminant. Contact    Microbiology if susceptibility testing clinically    indicated.    ***Blood Panel PCR results on this specimen are available    approximately 3 hours after the Gram stain result.***    Gram stain, PCR, and/or culture results may not always    correspond due to difference in methodologies.    ************************************************************    This PCR assay was performed by multiplex PCR. This    Assay tests for 66 bacterial and resistance gene targets.    Please refer to the [facility] test directory    at [email or web address] for details.  Organism: Blood Culture PCR (23 @ 11:33)  Organism: Blood Culture PCR (23 @ 11:33)      -  Coagulase negative Staphylococcus: Detec      Method Type: PCR    CMVPCR Lo.22 Lic62UV/mL ( @ 05:00)  CMVPCR Lo.32 Fyl81TI/mL (01-10 @ 15:34)  Rapid RVP Result: Detected ( @ 02:19)      RADIOLOGY:  Images below reviewed personally  US Kidney and Bladder (23 @ 17:42)   Echogenic right native kidney with cortical thinning, suggestive of   medical renal disease. No hydronephrosis.  Nonvisualization of the native left kidney, which may be surgically   absent. Correlate with surgical history.  Right lower quadrant transplant kidney without hydronephrosis.    Xray Chest 2 Views PA/Lat (23 @ 02:55)   Right lower lung patchy opacity, compatible with pneumonia in the   appropriate clinical setting.

## 2023-01-12 NOTE — PROGRESS NOTE ADULT - ASSESSMENT
67M A1c 7.7%, CABG, renal transplant 2015.   Here 1/9 with Flu A.   His acuity of symptoms (two days) makes superimposed bacterial infection less likely.   RLL opacity can be seen with viral pneumonia too.   However, given relative immunocompromised state, bandemia and degree of illness, opted for bacterial coverage.     Low level CMV viremia. Doubt causing disease but will treat out of caution.     CoNS on blood cultures appears contaminant. AV fistula not graft, no hardware.     Feels better overall.     Suggest  -I added Valcyte 450mg PO BID, renally dosed, plan for two weeks with weekly CMV PCR   -antirejection meds per renal in the setting of CMV   -Tamiflu day 5   -monitor blood cultures   -aim to finish Ceftriaxone tomorrow for 5 days   -if Legionella urine antigen negative stop Azithromycin     Discussed with medicine     Tal Godinez MD   Infectious Disease   Available on TEAMS. After 5PM and on weekends please page fellow on call or call [phone]

## 2023-01-12 NOTE — PROGRESS NOTE ADULT - ASSESSMENT
68 yo male with h/o HTN, DM-2, HLD, CAD w/ 1x NATALIE, CABG in 2005, R kidney transplant in 2015 at [facility] on prednisone, prograf and cellcept, presents to ED with 2 day hx of SOB, found to be flu +, desating requiring bipap    kidney transplant recipient  s/p right kidney transplant 2015 at [facility]   Prograf 1 mg in AM and 0.5 mg in the evening, Prednisone2.5mg BID, and Cellcept 1 gm BID. Recommend to hold MMF while pt has the flu.  Tacrolimus level 1/10 at goal. Goal 4-7  would hold cellcept right now    acute on ckd  baseline ~ 1.3-1.4  slight IZZY likely sec to prerenal  better today  check UA, urine cr, and urine na  will need optimize dm control    hyponatremia  due to hyperglycemia  optimize dm control  monitor    hypophos  supplement  monitor    Influenza A  care per team

## 2023-01-12 NOTE — PROGRESS NOTE ADULT - SUBJECTIVE AND OBJECTIVE BOX
[facility]   MD HUNTER BONILLA MD KRISTINE SOLTANPOUR, DO ANGELA WONG, PA    TEL:  OFFICE: [phone]  From 5pm-7am Answering Service [phone]    -- RENAL FOLLOW UP NOTE ---Date of Service 01-12-23 @ 15:04    Patient is a 67y old  Male who presents with a chief complaint of sob, flu, pneumonia, sepsis (12 Jan 2023 14:41)      Patient seen and examined at bedside. No chest pain/sob    VITALS:  T(F): 97.4 (01-12-23 @ 10:30), Max: 98.3 (01-11-23 @ 21:15)  HR: 76 (01-12-23 @ 14:26)  BP: 146/64 (01-12-23 @ 12:30)  RR: 18 (01-12-23 @ 10:30)  SpO2: 96% (01-12-23 @ 14:26)  Wt(kg): --    01-11 @ 07:01 - 01-12 @ 07:00  --------------------------------------------------------  IN: 480 mL / OUT: 600 mL / NET: -120 mL          PHYSICAL EXAM:  General: NAD  Neck: No JVD  Respiratory: mild wheeze  Cardiovascular: S1, S2, RRR  Gastrointestinal: BS+, soft, NT/ND  Extremities: No peripheral edema    Hospital Medications:   MEDICATIONS  (STANDING):  aspirin  chewable 81 milliGRAM(s) Oral daily  atorvastatin 20 milliGRAM(s) Oral at bedtime  azithromycin  IVPB 500 milliGRAM(s) IV Intermittent every 24 hours  carvedilol 25 milliGRAM(s) Oral every 12 hours  cefTRIAXone   IVPB 1000 milliGRAM(s) IV Intermittent every 24 hours  cholecalciferol 1000 Unit(s) Oral daily  dextrose 5%. 1000 milliLiter(s) (100 mL/Hr) IV Continuous <Continuous>  dextrose 5%. 1000 milliLiter(s) (50 mL/Hr) IV Continuous <Continuous>  dextrose 50% Injectable 25 Gram(s) IV Push once  dextrose 50% Injectable 12.5 Gram(s) IV Push once  dextrose 50% Injectable 25 Gram(s) IV Push once  folic acid 1 milliGRAM(s) Oral daily  glucagon  Injectable 1 milliGRAM(s) IntraMuscular once  heparin   Injectable 5000 Unit(s) SubCutaneous every 12 hours  insulin lispro (ADMELOG) corrective regimen sliding scale   SubCutaneous three times a day before meals  insulin lispro (ADMELOG) corrective regimen sliding scale   SubCutaneous at bedtime  insulin lispro Injectable (ADMELOG) 8 Unit(s) SubCutaneous three times a day before meals  isosorbide   mononitrate ER Tablet (IMDUR) 30 milliGRAM(s) Oral daily  oseltamivir 30 milliGRAM(s) Oral two times a day  predniSONE   Tablet 2.5 milliGRAM(s) Oral daily  tacrolimus 0.5 milliGRAM(s) Oral at bedtime  tacrolimus 1 milliGRAM(s) Oral daily      LABS:  01-12    132<L>  |  96<L>  |  37<H>  ----------------------------<  257<H>  4.1   |  25  |  1.29    Ca    8.7      12 Jan 2023 05:00  Phos  2.3     01-12  Mg     1.80     01-12    TPro  6.1  /  Alb  3.0<L>  /  TBili  1.0  /  DBili      /  AST  254<H>  /  ALT  559<H>  /  AlkPhos  208<H>  01-12    Creatinine Trend: 1.29 <--, 1.31 <--, 1.54 <--, 1.41 <--, 1.59 <--, 1.72 <--    Albumin, Serum: 3.0 g/dL (01-12 @ 05:00)  Phosphorus Level, Serum: 2.3 mg/dL (01-12 @ 05:00)                              11.9   12.37 )-----------( 174      ( 12 Jan 2023 05:00 )             36.5     Urine Studies:      Vitamin D (25OH) 36.9      [01-11-23 @ 06:30]  TSH 1.42      [01-09-23 @ 09:50]    HBsAg Nonreact      [01-09-23 @ 09:50]  HCV 0.17, Nonreact      [01-10-23 @ 06:00]    SHOAIB: titer Negative, pattern --      [01-10-23 @ 15:34]    RADIOLOGY & ADDITIONAL STUDIES:

## 2023-01-12 NOTE — PROGRESS NOTE ADULT - SUBJECTIVE AND OBJECTIVE BOX
Interval Events:   - Feeling better    Allergies:  No Known Allergies        Hospital Medications:  acetaminophen     Tablet .. 650 milliGRAM(s) Oral every 6 hours PRN  aspirin  chewable 81 milliGRAM(s) Oral daily  atorvastatin 20 milliGRAM(s) Oral at bedtime  azithromycin  IVPB 500 milliGRAM(s) IV Intermittent every 24 hours  carvedilol 25 milliGRAM(s) Oral every 12 hours  cefTRIAXone   IVPB 1000 milliGRAM(s) IV Intermittent every 24 hours  cholecalciferol 1000 Unit(s) Oral daily  dextrose 5%. 1000 milliLiter(s) IV Continuous <Continuous>  dextrose 5%. 1000 milliLiter(s) IV Continuous <Continuous>  dextrose 50% Injectable 25 Gram(s) IV Push once  dextrose 50% Injectable 12.5 Gram(s) IV Push once  dextrose 50% Injectable 25 Gram(s) IV Push once  dextrose Oral Gel 15 Gram(s) Oral once PRN  folic acid 1 milliGRAM(s) Oral daily  glucagon  Injectable 1 milliGRAM(s) IntraMuscular once  heparin   Injectable 5000 Unit(s) SubCutaneous every 12 hours  insulin glargine Injectable (LANTUS) 22 Unit(s) SubCutaneous <User Schedule>  insulin lispro (ADMELOG) corrective regimen sliding scale   SubCutaneous three times a day before meals  insulin lispro (ADMELOG) corrective regimen sliding scale   SubCutaneous at bedtime  insulin lispro Injectable (ADMELOG) 8 Unit(s) SubCutaneous three times a day before meals  isosorbide   mononitrate ER Tablet (IMDUR) 30 milliGRAM(s) Oral daily  oseltamivir 30 milliGRAM(s) Oral two times a day  predniSONE   Tablet 2.5 milliGRAM(s) Oral daily  tacrolimus 0.5 milliGRAM(s) Oral at bedtime  tacrolimus 1 milliGRAM(s) Oral daily      PMHX/PSHX:  CAD (coronary artery disease)    DM (diabetes mellitus)    HTN (hypertension)    High cholesterol    S/P triple vessel bypass    S/P kidney transplant        Family history:  No pertinent family history in first degree relatives    FH: HTN (hypertension)        ROS: As per HPI, otherwise 14-point ROS reviewed and negative.      PHYSICAL EXAM:   Vital Signs:  Vital Signs Last 24 Hrs  T(C): 36.3 (12 Jan 2023 10:30), Max: 36.8 (11 Jan 2023 21:15)  T(F): 97.4 (12 Jan 2023 10:30), Max: 98.3 (11 Jan 2023 21:15)  HR: 70 (12 Jan 2023 12:30) (70 - 82)  BP: 146/64 (12 Jan 2023 12:30) (132/84 - 146/64)  BP(mean): --  RR: 18 (12 Jan 2023 10:30) (18 - 18)  SpO2: 97% (12 Jan 2023 11:05) (97% - 100%)    Parameters below as of 12 Jan 2023 10:30  Patient On (Oxygen Delivery Method): nasal cannula  O2 Flow (L/min): 4    Daily     Daily       01-11-23 @ 07:01  -  01-12-23 @ 07:00  --------------------------------------------------------  IN: 480 mL / OUT: 600 mL / NET: -120 mL      GENERAL:  No acute distress  HEENT:  Normocephalic/atraumtic,  no scleral icterus  CHEST:  No accessory muscle use  HEART:  Regular rate and rhythm  ABDOMEN:  Soft, non-tender, non-distended  EXTREMITIES:  No cyanosis, clubbing, or edema  SKIN:  No rash  NEURO:  Alert and oriented x 3, no asterixis, no tremor      LABS:                        11.9   12.37 )-----------( 174      ( 12 Jan 2023 05:00 )             36.5     Mean Cell Volume: 84.5 fL (01-12-23 @ 05:00)    01-12    132<L>  |  96<L>  |  37<H>  ----------------------------<  257<H>  4.1   |  25  |  1.29    Ca    8.7      12 Jan 2023 05:00  Phos  2.3     01-12  Mg     1.80     01-12    TPro  6.1  /  Alb  3.0<L>  /  TBili  1.0  /  DBili  x   /  AST  254<H>  /  ALT  559<H>  /  AlkPhos  208<H>  01-12    LIVER FUNCTIONS - ( 12 Jan 2023 05:00 )  Alb: 3.0 g/dL / Pro: 6.1 g/dL / ALK PHOS: 208 U/L / ALT: 559 U/L / AST: 254 U/L / GGT: x           PT/INR - ( 12 Jan 2023 05:00 )   PT: 16.3 sec;   INR: 1.40 ratio         PTT - ( 12 Jan 2023 05:00 )  PTT:29.4 sec                            11.9   12.37 )-----------( 174      ( 12 Jan 2023 05:00 )             36.5                         11.0   13.60 )-----------( 154      ( 11 Jan 2023 06:30 )             34.3                         11.7   14.41 )-----------( 153      ( 10 Sudeep 2023 06:00 )             37.1       Imaging:

## 2023-01-12 NOTE — PROGRESS NOTE ADULT - ASSESSMENT
66 yo male with h/o HTN, DM-2, HLD, CAD w/ 1x NATALIE, CABG in 2005, R kidney transplant in 2015 at [facility] on prednisone, prograf and cellcept, presents to ED with 2 day hx of SOB, cough, hypoxia, found to have influenza and RLL pneumonia

## 2023-01-12 NOTE — PROGRESS NOTE ADULT - ASSESSMENT
68yo M with PMH of HTN, DM2, HLD, CAD w/ 1x NATALIE, CABG in 2005, R kidney transplant in 2015 on prednisone, prograf and cellcept who presents with URI symptoms. Hepatology consulted for elevated liver tests.    # Elevated liver tests - Mostly hepatocellular. DDx includes viral hepatitis in an immunocompromised patient vs. sepsis related vs. DILI. Improving. Noted +CMV PCR, unclear significance.  # Influenza  # Renal transplant  # CAD    Recommendations:  - Monitor CMP, CBC, coags  - Please discuss with ID re: CMV treatment in the setting of +PCR in an immunocompromised patient  - f/u SHOAIB, ASMA, EBV PCR, HSV PCR  - MRI abdomen with IV contrast  - Rest of care per primary team    Please note that the recommendations are not final until attested by an attending.    Thank you for involving us in the care of this patient. Please reach out if any further questions.    Irving Stewart, PGY-6  Gastroenterology/Hepatology Fellow    Available on [employer] Teams  After 5PM/Weekends, please contact the on-call GI fellow: [phone]

## 2023-01-12 NOTE — PROGRESS NOTE ADULT - PROBLEM SELECTOR PLAN 3
-hx CAD with stent x1,  elevated probnp 5238, no chest pain  -c/w asa/statin, coreg, imdur  -monitor bp  -echo mild LV dysfunction, EF 47%, mild MR   although clinically doesn't appear to be in chf, no peripheral edema, no pulmonary congestion on cxr; pt also denies hx chf -hx right renal transplant in 2015, baseline creat appears to be 1.5-1.6 from Dec 2020  at home he takes tacrolimus 1mg am and 0.5mg pm, cellcept 1000mg bid, prednisone 2.5mg qd  -cellcept held in s/o infection  -c/w tacrolimus and Prednisone, Tacrolimus level 4.5   -daily BMP  -avoid nephrotoxins  -Renal sono showed Echogenic right native kidney with cortical thinning, suggestive of medical renal disease. No hydronephrosis. Nonvisualization of the native left kidney, which may be surgically absent. Right lower quadrant transplant kidney without hydronephrosis.  -f/u nephrology,

## 2023-01-12 NOTE — PROGRESS NOTE ADULT - ASSESSMENT
68 yo male with h/o HTN, transplant DM, HLD, CAD w/ 1x NATALIE, CABG in 2005, R kidney transplant in 2015 at [facility] on prednisone, prograf and cellcept, presents to ED with 2 day hx of SOB, a/w cough, +sick contact with family members have flu, denies chest pain/fever/chill. Endocrine consulted for transplant DM with steroid-induced hyperglycemia. He had some mild DKA initially but this resolved quickly. Received methylprednisolone 125mg x1 early morning on 1/9. Now 2.5mg prednisone daily, which is his home dose.    1. Poorly controlled transplant DM with steroid-induced hyperglycemia  DM diagnosis: Transplant DM, diagnosed after transplant around 2015 or 2016  Last A1c: 7.7  Endocrinologist: None  Home DM meds: Lantus 16 units every morning, Novolog 6 units TID (normally takes BID, lunch and dinner), Metformin 500mg daily and Alogliptin 25mg daily (wife assists)    While inpatient:  BG target 100-180 mg/dl  Overall glucose trend is improving. Fasting glucose remains elevated (FS in  mg/dl)  Increase Lantus to 24 units daily at 12 PM  Continue Admelog to 8 units SQ TID before meals (Hold if NPO/skips meal)   Continue Admelog moderate dose correctional scale before meals and Admelog moderate dose bedtime scale   Fingerstick BG before meals and bedtime  Carbohydrate consistent diet  Hypoglycemia protocol     Discharge Plan:   Likely to discharge patient home on basal/bolus insulin (dose TBD)  Counseled him and his wife that he should be taking rapid acting insulin 3x per day before meals   Lactate elevated and liver enzymes deranged - will most likely STOP Metformin - TBD  Recommend routine outpatient ophthalmology, podiatry and PCP followup     2. HTN  On Carvedilol, Isosorbide   Management per primary team/nephro.    3. HLD  On Atorvastatin 20mg daily - consider holding in setting of elevated LFTs    4. Vitamin D deficiency  Vitamin D 25OH resulted 36.9 WDL  Continue Cholecalciferol 1000 units daily (home med)    Thuy Burns  Nurse Practitioner  Division of Endocrinology & Diabetes  In house pager #[phone]/long range pager #[phone]    If before 9AM or after 6PM, or on weekends/holidays, please call endocrine answering service for assistance ([phone]).  For nonurgent matters email [email or web address] for assistance.

## 2023-01-12 NOTE — PROGRESS NOTE ADULT - PROBLEM SELECTOR PLAN 2
-hx right renal transplant in 2015, pt with hyponatremia Na 130, bun/cr 28/1.72, met acidosis hco3 18, baseline creat appears to be 1.5-1.6 from Dec 2020  at home he takes tacrolimus 1mg am and 0.5mg pm, cellcept 1000mg bid, prednisone 2.5mg qd  -given a liter of fluid bolus in ED  -f/u nephrology, cellcept held in s/o infection  -tacrolimus level 4.5 wnl  -daily BMP, Mg, phos  -avoid nephrotoxins  -Renal sono showed Echogenic right native kidney with cortical thinning, suggestive of medical renal disease. No hydronephrosis. Nonvisualization of the native left kidney, which may be surgically absent. Correlate with surgical history. Right lower quadrant transplant kidney without hydronephrosis. -sepsis present on admission d/t influenza and RLL pneumonia   -on admission WBC 14.9, elevated lactate 6 -->3.4 after fluid bolus  -Continue Ceftriaxone/Zithromax  -Continue Tamiflu   -F/up legionella Ag   -LE doppler w/ no evidence of deep vein thrombosis in the right and left lower extremities  -Blood culture showed staph epi, likely contaminant

## 2023-01-12 NOTE — PROGRESS NOTE ADULT - PROBLEM SELECTOR PLAN 7
prophylactic lovenox Poorly controlled transplant DM with steroid-induced hyperglycemia  Endo consult appreciated  A1c 7.7%  Continue ISS  Continue Lantus 22 units daily   Continue Admelog 8 units TID w/ meals   Monitor FS c/w Coreg and Imdur   Monitor BP

## 2023-01-12 NOTE — PROGRESS NOTE ADULT - PROBLEM SELECTOR PLAN 8
prophylactic lovenox Poorly controlled transplant DM with steroid-induced hyperglycemia  Endo consult appreciated  A1c 7.7%  Continue ISS  Continue Lantus 22 units daily   Continue Admelog 8 units TID w/ meals   Monitor FS

## 2023-01-12 NOTE — PROGRESS NOTE ADULT - PROBLEM SELECTOR PLAN 4
trend LFT daily  hepatitis panel neg  -RUQ Ultrasound showed no evidence for intrahepatic or extrahepatic biliary ductal dilatation. No gallstones, gallbladder wall thickening or pericholecystic fluid. avoid hepatotoxins  -Hepatology consulted, f/u recs  check SHOAIB, ASMA, EBV PCR, CMV PCR, HSV PCR  ordered MRI abdomen with contrast -hx CAD with stent x1, no chest pain  -c/w asa/statin, coreg, imdur  -monitor bp  -echo mild LV dysfunction, EF 47%, mild MR -positive CMV PCR   -spoke w/ ID and will likely treat in setting of immunocompromised state

## 2023-01-12 NOTE — PROGRESS NOTE ADULT - PROBLEM SELECTOR PLAN 6
Poorly controlled transplant DM with steroid-induced hyperglycemia  Endo consult appreciated  A1c 7.7%  Continue ISS  Continue Lantus 22 units daily   Continue Admelog 8 units TID w/ meals   Monitor FS c/w Coreg and Imdur   Monitor BP -improving, suspect DILI   -hepatitis panel neg  -RUQ Ultrasound showed no evidence for intrahepatic or extrahepatic biliary ductal dilatation. No gallstones, gallbladder wall thickening or pericholecystic fluid. avoid hepatotoxins  -trend LFT daily  -Hepatology following   -f/up SHOAIB, ASMA, EBV PCR  -CMV PCR +  -awaiting MRI abdomen with contrast

## 2023-01-12 NOTE — PROGRESS NOTE ADULT - PROBLEM SELECTOR PLAN 1
-sepsis and acute hypoxic respiratory failure in s/o influenza and RLL pneumonia,   -on admission WBC 14.9, elevated lactate 6 -->3.4 after fluid bolus, no CO2 retention on VBG, on admission pH 7.34, pCO2 36  - off bipap, weaned to NC 3L , wean as tolerated  -given ceftriaxone/zithromax, solumedrol 125mg, neb, 1 liter of fluid bolus in ED  -c/w ceftriaxone/zithromax  -tamiflu 75mg x1 then 30mg bid x5 day course  -f/u urine legionella and strep  -leg doppler (d-dimer 1950, although no leg edema and no chest pain) showed- No evidence of deep vein thrombosis in the right and left lower extremities.  -Blood culture showed staph epi, likely contaminant  -f/u ID, dc'd vanco -Due to pneumonia   -Continue antibiotics   -Supplemental oxygen, currently on 4L nc and will wean down as tolerated

## 2023-01-12 NOTE — PROGRESS NOTE ADULT - SUBJECTIVE AND OBJECTIVE BOX
Chief Complaint: DM 2    History: Patient seen at bedside with family members present. He is tolerating meals, denies n/v, denies s/s of hypoglycemia  Most recent  mg/dl, no hypoglycemia     MEDICATIONS  (STANDING):  aspirin  chewable 81 milliGRAM(s) Oral daily  atorvastatin 20 milliGRAM(s) Oral at bedtime  azithromycin  IVPB 500 milliGRAM(s) IV Intermittent every 24 hours  carvedilol 25 milliGRAM(s) Oral every 12 hours  cefTRIAXone   IVPB 1000 milliGRAM(s) IV Intermittent every 24 hours  cholecalciferol 1000 Unit(s) Oral daily  dextrose 5%. 1000 milliLiter(s) (100 mL/Hr) IV Continuous <Continuous>  dextrose 5%. 1000 milliLiter(s) (50 mL/Hr) IV Continuous <Continuous>  dextrose 50% Injectable 25 Gram(s) IV Push once  dextrose 50% Injectable 12.5 Gram(s) IV Push once  dextrose 50% Injectable 25 Gram(s) IV Push once  folic acid 1 milliGRAM(s) Oral daily  glucagon  Injectable 1 milliGRAM(s) IntraMuscular once  heparin   Injectable 5000 Unit(s) SubCutaneous every 12 hours  insulin glargine Injectable (LANTUS) 22 Unit(s) SubCutaneous <User Schedule>  insulin lispro (ADMELOG) corrective regimen sliding scale   SubCutaneous three times a day before meals  insulin lispro (ADMELOG) corrective regimen sliding scale   SubCutaneous at bedtime  insulin lispro Injectable (ADMELOG) 8 Unit(s) SubCutaneous three times a day before meals  isosorbide   mononitrate ER Tablet (IMDUR) 30 milliGRAM(s) Oral daily  oseltamivir 30 milliGRAM(s) Oral two times a day  predniSONE   Tablet 2.5 milliGRAM(s) Oral daily  tacrolimus 0.5 milliGRAM(s) Oral at bedtime  tacrolimus 1 milliGRAM(s) Oral daily    MEDICATIONS  (PRN):  acetaminophen     Tablet .. 650 milliGRAM(s) Oral every 6 hours PRN Temp greater or equal to 38C (100.4F), Mild Pain (1 - 3)  dextrose Oral Gel 15 Gram(s) Oral once PRN Blood Glucose LESS THAN 70 milliGRAM(s)/deciliter    No Known Allergies    Review of Systems:  Cardiovascular: No chest pain  Respiratory: No SOB  GI: No nausea, vomiting  Endocrine: no hypoglycemia     PHYSICAL EXAM:  VITALS: T(C): 36.3 (01-12-23 @ 10:30)  T(F): 97.4 (01-12-23 @ 10:30), Max: 98.3 (01-11-23 @ 21:15)  HR: 76 (01-12-23 @ 14:26) (70 - 82)  BP: 146/64 (01-12-23 @ 12:30) (132/84 - 146/64)  RR:  (18 - 18)  SpO2:  (96% - 100%)  Wt(kg): --  GENERAL: NAD  EYES: No proptosis, no lid lag, anicteric  HEENT:  Atraumatic, Normocephalic, moist mucous membranes  RESPIRATORY: unlabored respirations     CAPILLARY BLOOD GLUCOSE    POCT Blood Glucose.: 174 mg/dL (12 Jan 2023 12:22)  POCT Blood Glucose.: 224 mg/dL (12 Jan 2023 08:21)  POCT Blood Glucose.: 127 mg/dL (11 Jan 2023 21:32)  POCT Blood Glucose.: 158 mg/dL (11 Jan 2023 17:36)      01-12    132<L>  |  96<L>  |  37<H>  ----------------------------<  257<H>  4.1   |  25  |  1.29    eGFR: 60    Ca    8.7      01-12  Mg     1.80     01-12  Phos  2.3     01-12    TPro  6.1  /  Alb  3.0<L>  /  TBili  1.0  /  DBili  x   /  AST  254<H>  /  ALT  559<H>  /  AlkPhos  208<H>  01-12      Thyroid Function Tests:  01-09 @ 09:50 TSH 1.42 FreeT4 -- T3 -- Anti TPO -- Anti Thyroglobulin Ab -- TSI --      A1C with Estimated Average Glucose Result: 7.7 % (01-09-23 @ 09:50)    Diet, Regular:   Consistent Carbohydrate No Snacks (CSTCHO)  DASH/TLC Sodium & Cholesterol Restricted (DASH) (01-09-23 @ 09:50) [Active]

## 2023-01-13 LAB
ANA TITR SER: NEGATIVE — SIGNIFICANT CHANGE UP
APTT BLD: 34.1 SEC — SIGNIFICANT CHANGE UP (ref 27–36.3)
EBV DNA SERPL NAA+PROBE-ACNC: SIGNIFICANT CHANGE UP IU/ML
EBVPCR LOG: SIGNIFICANT CHANGE UP LOG10IU/ML
GLUCOSE BLDC GLUCOMTR-MCNC: 119 MG/DL — HIGH (ref 70–99)
GLUCOSE BLDC GLUCOMTR-MCNC: 147 MG/DL — HIGH (ref 70–99)
GLUCOSE BLDC GLUCOMTR-MCNC: 174 MG/DL — HIGH (ref 70–99)
GLUCOSE BLDC GLUCOMTR-MCNC: 223 MG/DL — HIGH (ref 70–99)
INR BLD: 1.34 RATIO — HIGH (ref 0.88–1.16)
LEGIONELLA AG UR QL: NEGATIVE — SIGNIFICANT CHANGE UP
PROTHROM AB SERPL-ACNC: 15.6 SEC — HIGH (ref 10.5–13.4)

## 2023-01-13 PROCEDURE — 99232 SBSQ HOSP IP/OBS MODERATE 35: CPT | Mod: GC

## 2023-01-13 PROCEDURE — 99233 SBSQ HOSP IP/OBS HIGH 50: CPT

## 2023-01-13 RX ADMIN — CARVEDILOL PHOSPHATE 25 MILLIGRAM(S): 80 CAPSULE, EXTENDED RELEASE ORAL at 17:38

## 2023-01-13 RX ADMIN — Medication 8 UNIT(S): at 08:58

## 2023-01-13 RX ADMIN — Medication 2: at 08:58

## 2023-01-13 RX ADMIN — Medication 1000 UNIT(S): at 13:21

## 2023-01-13 RX ADMIN — Medication 1 MILLIGRAM(S): at 13:20

## 2023-01-13 RX ADMIN — HEPARIN SODIUM 5000 UNIT(S): 5000 INJECTION INTRAVENOUS; SUBCUTANEOUS at 17:36

## 2023-01-13 RX ADMIN — Medication 4: at 17:35

## 2023-01-13 RX ADMIN — Medication 81 MILLIGRAM(S): at 13:20

## 2023-01-13 RX ADMIN — TACROLIMUS 1 MILLIGRAM(S): 5 CAPSULE ORAL at 13:21

## 2023-01-13 RX ADMIN — ATORVASTATIN CALCIUM 20 MILLIGRAM(S): 80 TABLET, FILM COATED ORAL at 21:10

## 2023-01-13 RX ADMIN — Medication 2.5 MILLIGRAM(S): at 05:16

## 2023-01-13 RX ADMIN — TACROLIMUS 0.5 MILLIGRAM(S): 5 CAPSULE ORAL at 21:10

## 2023-01-13 RX ADMIN — HEPARIN SODIUM 5000 UNIT(S): 5000 INJECTION INTRAVENOUS; SUBCUTANEOUS at 05:15

## 2023-01-13 RX ADMIN — ISOSORBIDE MONONITRATE 30 MILLIGRAM(S): 60 TABLET, EXTENDED RELEASE ORAL at 13:21

## 2023-01-13 RX ADMIN — CEFTRIAXONE 100 MILLIGRAM(S): 500 INJECTION, POWDER, FOR SOLUTION INTRAMUSCULAR; INTRAVENOUS at 03:03

## 2023-01-13 RX ADMIN — VALGANCICLOVIR 450 MILLIGRAM(S): 450 TABLET, FILM COATED ORAL at 17:46

## 2023-01-13 RX ADMIN — CARVEDILOL PHOSPHATE 25 MILLIGRAM(S): 80 CAPSULE, EXTENDED RELEASE ORAL at 05:17

## 2023-01-13 RX ADMIN — INSULIN GLARGINE 24 UNIT(S): 100 INJECTION, SOLUTION SUBCUTANEOUS at 13:19

## 2023-01-13 RX ADMIN — Medication 30 MILLIGRAM(S): at 17:48

## 2023-01-13 RX ADMIN — Medication 30 MILLIGRAM(S): at 05:16

## 2023-01-13 RX ADMIN — VALGANCICLOVIR 450 MILLIGRAM(S): 450 TABLET, FILM COATED ORAL at 05:16

## 2023-01-13 NOTE — PROGRESS NOTE ADULT - ASSESSMENT
68 yo male with h/o HTN, DM-2, HLD, CAD w/ 1x NATALIE, CABG in 2005, R kidney transplant in 2015 at [facility] on prednisone, prograf and cellcept, presents to ED with 2 day hx of SOB, cough, hypoxia, found to have influenza and RLL pneumonia

## 2023-01-13 NOTE — PROGRESS NOTE ADULT - PROBLEM SELECTOR PLAN 1
-Due to pneumonia   -Continue antibiotics   -Supplemental oxygen, currently on 4L nc and will wean down as tolerated

## 2023-01-13 NOTE — PROGRESS NOTE ADULT - SUBJECTIVE AND OBJECTIVE BOX
Interval Events:   - Feeling better  - Started on valganciclovir for low      Allergies:  No Known Allergies        Hospital Medications:  acetaminophen     Tablet .. 650 milliGRAM(s) Oral every 6 hours PRN  aspirin  chewable 81 milliGRAM(s) Oral daily  atorvastatin 20 milliGRAM(s) Oral at bedtime  carvedilol 25 milliGRAM(s) Oral every 12 hours  cholecalciferol 1000 Unit(s) Oral daily  dextrose 5%. 1000 milliLiter(s) IV Continuous <Continuous>  dextrose 5%. 1000 milliLiter(s) IV Continuous <Continuous>  dextrose 50% Injectable 25 Gram(s) IV Push once  dextrose 50% Injectable 12.5 Gram(s) IV Push once  dextrose 50% Injectable 25 Gram(s) IV Push once  dextrose Oral Gel 15 Gram(s) Oral once PRN  folic acid 1 milliGRAM(s) Oral daily  glucagon  Injectable 1 milliGRAM(s) IntraMuscular once  heparin   Injectable 5000 Unit(s) SubCutaneous every 12 hours  insulin glargine Injectable (LANTUS) 24 Unit(s) SubCutaneous <User Schedule>  insulin lispro (ADMELOG) corrective regimen sliding scale   SubCutaneous three times a day before meals  insulin lispro (ADMELOG) corrective regimen sliding scale   SubCutaneous at bedtime  insulin lispro Injectable (ADMELOG) 8 Unit(s) SubCutaneous three times a day before meals  isosorbide   mononitrate ER Tablet (IMDUR) 30 milliGRAM(s) Oral daily  oseltamivir 30 milliGRAM(s) Oral two times a day  predniSONE   Tablet 2.5 milliGRAM(s) Oral daily  tacrolimus 0.5 milliGRAM(s) Oral at bedtime  tacrolimus 1 milliGRAM(s) Oral daily  valGANciclovir 450 milliGRAM(s) Oral two times a day      PMHX/PSHX:  CAD (coronary artery disease)    DM (diabetes mellitus)    HTN (hypertension)    High cholesterol    S/P triple vessel bypass    S/P kidney transplant        Family history:  No pertinent family history in first degree relatives    FH: HTN (hypertension)        ROS: As per HPI, otherwise 14-point ROS reviewed and negative.      PHYSICAL EXAM:   Vital Signs:  Vital Signs Last 24 Hrs  T(C): 36.9 (13 Jan 2023 05:15), Max: 36.9 (13 Jan 2023 05:15)  T(F): 98.4 (13 Jan 2023 05:15), Max: 98.4 (13 Jan 2023 05:15)  HR: 75 (13 Jan 2023 08:08) (70 - 81)  BP: 131/68 (13 Jan 2023 05:15) (131/68 - 146/64)  BP(mean): --  RR: 19 (13 Jan 2023 05:15) (18 - 19)  SpO2: 96% (13 Jan 2023 08:08) (95% - 98%)    Parameters below as of 13 Jan 2023 05:15  Patient On (Oxygen Delivery Method): BiPAP/CPAP      Daily     Daily         GENERAL:  No acute distress  HEENT:  Normocephalic/atraumtic,  no scleral icterus  CHEST:  No accessory muscle use  HEART:  Regular rate and rhythm  ABDOMEN:  Soft, non-tender, non-distended, normoactive bowel sounds, no masses, no hepato-splenomegaly, no signs of chronic liver disease  EXTREMITIES:  No cyanosis, clubbing, or edema  SKIN:  No rash  NEURO:  Alert and oriented x 3, no asterixis, no tremor    LABS:                        11.9   12.37 )-----------( 174      ( 12 Jan 2023 05:00 )             36.5       01-12    132<L>  |  96<L>  |  37<H>  ----------------------------<  257<H>  4.1   |  25  |  1.29    Ca    8.7      12 Jan 2023 05:00  Phos  2.3     01-12  Mg     1.80     01-12    TPro  6.1  /  Alb  3.0<L>  /  TBili  1.0  /  DBili  x   /  AST  254<H>  /  ALT  559<H>  /  AlkPhos  208<H>  01-12    LIVER FUNCTIONS - ( 12 Jan 2023 05:00 )  Alb: 3.0 g/dL / Pro: 6.1 g/dL / ALK PHOS: 208 U/L / ALT: 559 U/L / AST: 254 U/L / GGT: x           PT/INR - ( 13 Jan 2023 06:45 )   PT: 15.6 sec;   INR: 1.34 ratio         PTT - ( 13 Jan 2023 06:45 )  PTT:34.1 sec                            11.9   12.37 )-----------( 174      ( 12 Jan 2023 05:00 )             36.5                         11.0   13.60 )-----------( 154      ( 11 Jan 2023 06:30 )             34.3       Imaging:           Interval Events:   - Feeling better  - Started on valganciclovir for low      Allergies:  No Known Allergies        Hospital Medications:  acetaminophen     Tablet .. 650 milliGRAM(s) Oral every 6 hours PRN  aspirin  chewable 81 milliGRAM(s) Oral daily  atorvastatin 20 milliGRAM(s) Oral at bedtime  carvedilol 25 milliGRAM(s) Oral every 12 hours  cholecalciferol 1000 Unit(s) Oral daily  dextrose 5%. 1000 milliLiter(s) IV Continuous <Continuous>  dextrose 5%. 1000 milliLiter(s) IV Continuous <Continuous>  dextrose 50% Injectable 25 Gram(s) IV Push once  dextrose 50% Injectable 12.5 Gram(s) IV Push once  dextrose 50% Injectable 25 Gram(s) IV Push once  dextrose Oral Gel 15 Gram(s) Oral once PRN  folic acid 1 milliGRAM(s) Oral daily  glucagon  Injectable 1 milliGRAM(s) IntraMuscular once  heparin   Injectable 5000 Unit(s) SubCutaneous every 12 hours  insulin glargine Injectable (LANTUS) 24 Unit(s) SubCutaneous <User Schedule>  insulin lispro (ADMELOG) corrective regimen sliding scale   SubCutaneous three times a day before meals  insulin lispro (ADMELOG) corrective regimen sliding scale   SubCutaneous at bedtime  insulin lispro Injectable (ADMELOG) 8 Unit(s) SubCutaneous three times a day before meals  isosorbide   mononitrate ER Tablet (IMDUR) 30 milliGRAM(s) Oral daily  oseltamivir 30 milliGRAM(s) Oral two times a day  predniSONE   Tablet 2.5 milliGRAM(s) Oral daily  tacrolimus 0.5 milliGRAM(s) Oral at bedtime  tacrolimus 1 milliGRAM(s) Oral daily  valGANciclovir 450 milliGRAM(s) Oral two times a day      PMHX/PSHX:  CAD (coronary artery disease)    DM (diabetes mellitus)    HTN (hypertension)    High cholesterol    S/P triple vessel bypass    S/P kidney transplant        Family history:  No pertinent family history in first degree relatives    FH: HTN (hypertension)        ROS: As per HPI, otherwise 14-point ROS reviewed and negative.      PHYSICAL EXAM:   Vital Signs:  Vital Signs Last 24 Hrs  T(C): 36.9 (13 Jan 2023 05:15), Max: 36.9 (13 Jan 2023 05:15)  T(F): 98.4 (13 Jan 2023 05:15), Max: 98.4 (13 Jan 2023 05:15)  HR: 75 (13 Jan 2023 08:08) (70 - 81)  BP: 131/68 (13 Jan 2023 05:15) (131/68 - 146/64)  BP(mean): --  RR: 19 (13 Jan 2023 05:15) (18 - 19)  SpO2: 96% (13 Jan 2023 08:08) (95% - 98%)    Parameters below as of 13 Jan 2023 05:15  Patient On (Oxygen Delivery Method): BiPAP/CPAP      Daily     Daily       GENERAL:  No acute distress  HEENT:  Normocephalic/atraumtic,  no scleral icterus  CHEST:  No accessory muscle use  HEART:  Regular rate and rhythm  ABDOMEN:  Soft, non-tender, non-distended  EXTREMITIES:  No cyanosis, clubbing, or edema  SKIN:  No rash  NEURO:  Alert and oriented x 3, no asterixis, no tremor    LABS:                        11.9   12.37 )-----------( 174      ( 12 Jan 2023 05:00 )             36.5       01-12    132<L>  |  96<L>  |  37<H>  ----------------------------<  257<H>  4.1   |  25  |  1.29    Ca    8.7      12 Jan 2023 05:00  Phos  2.3     01-12  Mg     1.80     01-12    TPro  6.1  /  Alb  3.0<L>  /  TBili  1.0  /  DBili  x   /  AST  254<H>  /  ALT  559<H>  /  AlkPhos  208<H>  01-12    LIVER FUNCTIONS - ( 12 Jan 2023 05:00 )  Alb: 3.0 g/dL / Pro: 6.1 g/dL / ALK PHOS: 208 U/L / ALT: 559 U/L / AST: 254 U/L / GGT: x           PT/INR - ( 13 Jan 2023 06:45 )   PT: 15.6 sec;   INR: 1.34 ratio         PTT - ( 13 Jan 2023 06:45 )  PTT:34.1 sec                            11.9   12.37 )-----------( 174      ( 12 Jan 2023 05:00 )             36.5                         11.0   13.60 )-----------( 154      ( 11 Jan 2023 06:30 )             34.3       Imaging:

## 2023-01-13 NOTE — PROGRESS NOTE ADULT - PROBLEM SELECTOR PLAN 2
-sepsis present on admission d/t influenza and RLL pneumonia   -on admission WBC 14.9, elevated lactate 6 -->3.4 after fluid bolus  -s/p Ceftriaxone/Zithromax  -Continue Tamiflu   -urine legionella Ag neg   -LE doppler w/ no evidence of deep vein thrombosis in the right and left lower extremities  -Blood culture showed staph epi, likely contaminant

## 2023-01-13 NOTE — PROGRESS NOTE ADULT - ASSESSMENT
66yo M with PMH of HTN, DM2, HLD, CAD w/ 1x NATALIE, CABG in 2005, R kidney transplant in 2015 on prednisone, prograf and cellcept who presents with URI symptoms. Hepatology consulted for elevated liver tests.    # Elevated liver tests - Mostly hepatocellular. DDx includes viral hepatitis in an immunocompromised patient vs. sepsis related vs. DILI. Improving. Noted +CMV PCR, unclear significance, likely not the etiology given low viremia levels.  # Influenza  # Renal transplant  # CAD    Recommendations:  - Monitor CMP, CBC, coags  - ID recs appreciated, on valganciclocyr  - f/u ASMA, EBV PCR  - f/u MRI abdomen with IV contrast  - Rest of care per primary team    Please note that the recommendations are not final until attested by an attending.    Thank you for involving us in the care of this patient. Please reach out if any further questions.    Irving Stewart, PGY-6  Gastroenterology/Hepatology Fellow    Available on [employer] Teams  After 5PM/Weekends, please contact the on-call GI fellow: [phone]

## 2023-01-13 NOTE — PROGRESS NOTE ADULT - ATTENDING COMMENTS
Patient post renal transplant on tacrolimus based immune suppression with appropriate tacrolimus level. Has influenza and is covered on antibiotics .  Has low titer CMV and in now on valganciclovir.  Patient has elevated liver tests which has been showing improvement. No evidence of hepatic venous congestion.  Liver abnormality likely secondary to viral infection and no additional therapy recommended.

## 2023-01-13 NOTE — PROGRESS NOTE ADULT - PROBLEM SELECTOR PLAN 6
-improving, suspect DILI   -hepatitis panel neg  -RUQ Ultrasound showed no evidence for intrahepatic or extrahepatic biliary ductal dilatation. No gallstones, gallbladder wall thickening or pericholecystic fluid. avoid hepatotoxins  -trend LFT daily  -Hepatology following   -SHOAIB neg   -F/up ASMA, EBV PCR  -awaiting MRI abdomen with contrast

## 2023-01-13 NOTE — PROGRESS NOTE ADULT - PROBLEM SELECTOR PLAN 3
-hx right renal transplant in 2015, baseline creat appears to be 1.5-1.6 from Dec 2020  at home he takes tacrolimus 1mg am and 0.5mg pm, cellcept 1000mg bid, prednisone 2.5mg qd  -cellcept held in s/o infection  -c/w tacrolimus and Prednisone, Tacrolimus level 4.5   -daily BMP  -avoid nephrotoxins  -Renal sono showed Echogenic right native kidney with cortical thinning, suggestive of medical renal disease. No hydronephrosis. Nonvisualization of the native left kidney, which may be surgically absent. Right lower quadrant transplant kidney without hydronephrosis.  -f/u nephrology,

## 2023-01-13 NOTE — PROGRESS NOTE ADULT - SUBJECTIVE AND OBJECTIVE BOX
[facility]   MD HUNTER BONILLA MD KRISTINE SOLTANPOUR, DO ANGELA WONG, PA    TEL:  OFFICE: [phone]  From 5pm-7am Answering Service [phone]    -- RENAL FOLLOW UP NOTE ---Date of Service 01-13-23 @ 13:46    Patient is a 67y old  Male who presents with a chief complaint of sob, flu, pneumonia, sepsis (13 Jan 2023 12:54)      Patient seen and examined at bedside. No chest pain/sob    VITALS:  T(F): 98.3 (01-13-23 @ 11:45), Max: 98.4 (01-13-23 @ 05:15)  HR: 74 (01-13-23 @ 11:45)  BP: 136/61 (01-13-23 @ 11:45)  RR: 18 (01-13-23 @ 11:45)  SpO2: 96% (01-13-23 @ 11:45)  Wt(kg): --        PHYSICAL EXAM:  General: NAD  Neck: No JVD  Respiratory: right side crackles  Cardiovascular: S1, S2, RRR  Gastrointestinal: BS+, soft, NT/ND  Extremities: No peripheral edema    Hospital Medications:   MEDICATIONS  (STANDING):  aspirin  chewable 81 milliGRAM(s) Oral daily  atorvastatin 20 milliGRAM(s) Oral at bedtime  carvedilol 25 milliGRAM(s) Oral every 12 hours  cholecalciferol 1000 Unit(s) Oral daily  dextrose 5%. 1000 milliLiter(s) (50 mL/Hr) IV Continuous <Continuous>  dextrose 5%. 1000 milliLiter(s) (100 mL/Hr) IV Continuous <Continuous>  dextrose 50% Injectable 25 Gram(s) IV Push once  dextrose 50% Injectable 12.5 Gram(s) IV Push once  dextrose 50% Injectable 25 Gram(s) IV Push once  folic acid 1 milliGRAM(s) Oral daily  glucagon  Injectable 1 milliGRAM(s) IntraMuscular once  heparin   Injectable 5000 Unit(s) SubCutaneous every 12 hours  insulin glargine Injectable (LANTUS) 24 Unit(s) SubCutaneous <User Schedule>  insulin lispro (ADMELOG) corrective regimen sliding scale   SubCutaneous three times a day before meals  insulin lispro (ADMELOG) corrective regimen sliding scale   SubCutaneous at bedtime  insulin lispro Injectable (ADMELOG) 8 Unit(s) SubCutaneous three times a day before meals  isosorbide   mononitrate ER Tablet (IMDUR) 30 milliGRAM(s) Oral daily  oseltamivir 30 milliGRAM(s) Oral two times a day  predniSONE   Tablet 2.5 milliGRAM(s) Oral daily  tacrolimus 0.5 milliGRAM(s) Oral at bedtime  tacrolimus 1 milliGRAM(s) Oral daily  valGANciclovir 450 milliGRAM(s) Oral two times a day      LABS:  01-12    132<L>  |  96<L>  |  37<H>  ----------------------------<  257<H>  4.1   |  25  |  1.29    Ca    8.7      12 Jan 2023 05:00  Phos  2.3     01-12  Mg     1.80     01-12    TPro  6.1  /  Alb  3.0<L>  /  TBili  1.0  /  DBili      /  AST  254<H>  /  ALT  559<H>  /  AlkPhos  208<H>  01-12    Creatinine Trend: 1.29 <--, 1.31 <--, 1.54 <--, 1.41 <--, 1.59 <--, 1.72 <--                                11.9   12.37 )-----------( 174      ( 12 Jan 2023 05:00 )             36.5     Urine Studies:      Vitamin D (25OH) 36.9      [01-11-23 @ 06:30]  TSH 1.42      [01-09-23 @ 09:50]    HBsAg Nonreact      [01-09-23 @ 09:50]  HCV 0.17, Nonreact      [01-10-23 @ 06:00]    SHOAIB: titer Negative, pattern --      [01-12-23 @ 05:00]    RADIOLOGY & ADDITIONAL STUDIES:

## 2023-01-13 NOTE — PROGRESS NOTE ADULT - PROBLEM SELECTOR PLAN 5
-hx CAD with stent x1, no chest pain  -c/w asa/statin, coreg, imdur  -monitor bp  -echo mild LV dysfunction, EF 47%, mild MR

## 2023-01-13 NOTE — PROGRESS NOTE ADULT - SUBJECTIVE AND OBJECTIVE BOX
PROGRESS NOTE:     Patient is a 67y old  Male who presents with a chief complaint of sob, flu, pneumonia, sepsis (13 Jan 2023 08:44)      SUBJECTIVE / OVERNIGHT EVENTS: No new complaints.     ADDITIONAL REVIEW OF SYSTEMS:    MEDICATIONS  (STANDING):  aspirin  chewable 81 milliGRAM(s) Oral daily  atorvastatin 20 milliGRAM(s) Oral at bedtime  carvedilol 25 milliGRAM(s) Oral every 12 hours  cholecalciferol 1000 Unit(s) Oral daily  dextrose 5%. 1000 milliLiter(s) (100 mL/Hr) IV Continuous <Continuous>  dextrose 5%. 1000 milliLiter(s) (50 mL/Hr) IV Continuous <Continuous>  dextrose 50% Injectable 25 Gram(s) IV Push once  dextrose 50% Injectable 12.5 Gram(s) IV Push once  dextrose 50% Injectable 25 Gram(s) IV Push once  folic acid 1 milliGRAM(s) Oral daily  glucagon  Injectable 1 milliGRAM(s) IntraMuscular once  heparin   Injectable 5000 Unit(s) SubCutaneous every 12 hours  insulin glargine Injectable (LANTUS) 24 Unit(s) SubCutaneous <User Schedule>  insulin lispro (ADMELOG) corrective regimen sliding scale   SubCutaneous three times a day before meals  insulin lispro (ADMELOG) corrective regimen sliding scale   SubCutaneous at bedtime  insulin lispro Injectable (ADMELOG) 8 Unit(s) SubCutaneous three times a day before meals  isosorbide   mononitrate ER Tablet (IMDUR) 30 milliGRAM(s) Oral daily  oseltamivir 30 milliGRAM(s) Oral two times a day  predniSONE   Tablet 2.5 milliGRAM(s) Oral daily  tacrolimus 0.5 milliGRAM(s) Oral at bedtime  tacrolimus 1 milliGRAM(s) Oral daily  valGANciclovir 450 milliGRAM(s) Oral two times a day    MEDICATIONS  (PRN):  acetaminophen     Tablet .. 650 milliGRAM(s) Oral every 6 hours PRN Temp greater or equal to 38C (100.4F), Mild Pain (1 - 3)  dextrose Oral Gel 15 Gram(s) Oral once PRN Blood Glucose LESS THAN 70 milliGRAM(s)/deciliter      CAPILLARY BLOOD GLUCOSE      POCT Blood Glucose.: 119 mg/dL (13 Jan 2023 12:13)  POCT Blood Glucose.: 174 mg/dL (13 Jan 2023 08:43)  POCT Blood Glucose.: 163 mg/dL (12 Jan 2023 22:20)  POCT Blood Glucose.: 148 mg/dL (12 Jan 2023 17:26)    I&O's Summary      PHYSICAL EXAM:  Vital Signs Last 24 Hrs  T(C): 36.8 (13 Jan 2023 11:45), Max: 36.9 (13 Jan 2023 05:15)  T(F): 98.3 (13 Jan 2023 11:45), Max: 98.4 (13 Jan 2023 05:15)  HR: 74 (13 Jan 2023 11:45) (72 - 81)  BP: 136/61 (13 Jan 2023 11:45) (131/68 - 136/78)  BP(mean): --  RR: 18 (13 Jan 2023 11:45) (18 - 19)  SpO2: 96% (13 Jan 2023 11:45) (95% - 98%)    Parameters below as of 13 Jan 2023 11:45  Patient On (Oxygen Delivery Method): nasal cannula        EYES: EOMI, PERRLA, conjunctiva and sclera clear  NECK: Supple, No JVD  CHEST/LUNG: right basilar crackle and decreased BS, otherwise clear  HEART: Regular rate and rhythm; No murmurs, rubs, or gallops  ABDOMEN: Soft, Nontender, Nondistended; Bowel sounds present  EXTREMITIES:  2+ Peripheral Pulses, No clubbing, cyanosis, or edema  PSYCH: AAOx3  NEUROLOGY: non-focal, CN grossly intact  SKIN: No rashes or lesions    LABS:                        11.9   12.37 )-----------( 174      ( 12 Jan 2023 05:00 )             36.5     01-12    132<L>  |  96<L>  |  37<H>  ----------------------------<  257<H>  4.1   |  25  |  1.29    Ca    8.7      12 Jan 2023 05:00  Phos  2.3     01-12  Mg     1.80     01-12    TPro  6.1  /  Alb  3.0<L>  /  TBili  1.0  /  DBili  x   /  AST  254<H>  /  ALT  559<H>  /  AlkPhos  208<H>  01-12    PT/INR - ( 13 Jan 2023 06:45 )   PT: 15.6 sec;   INR: 1.34 ratio         PTT - ( 13 Jan 2023 06:45 )  PTT:34.1 sec          Culture - Legionella (collected 11 Jan 2023 11:46)  Source: .Legionella  Preliminary Report (12 Jan 2023 16:36):    Culture in progress    Culture - Nose (collected 10 Sudeep 2023 15:56)  Source: .Nose  Final Report (12 Jan 2023 10:33):    No staphylococcus aureus isolated.    "PCR is more Sensitive for identifying MRSA/MSSA."        RADIOLOGY & ADDITIONAL TESTS:  Results Reviewed:   Imaging Personally Reviewed:  Electrocardiogram Personally Reviewed:    COORDINATION OF CARE:  Care Discussed with Consultants/Other Providers [Y/N]:  Prior or Outpatient Records Reviewed [Y/N]:

## 2023-01-13 NOTE — PROGRESS NOTE ADULT - PROBLEM SELECTOR PLAN 8
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

## 2023-01-13 NOTE — PROGRESS NOTE ADULT - ASSESSMENT
66 yo male with h/o HTN, DM-2, HLD, CAD w/ 1x NATALIE, CABG in 2005, R kidney transplant in 2015 at [facility] on prednisone, prograf and cellcept, presents to ED with 2 day hx of SOB, found to be flu +, desating requiring bipap    kidney transplant recipient  s/p right kidney transplant 2015 at [facility]   Prograf 1 mg in AM and 0.5 mg in the evening, Prednisone2.5mg BID, and Cellcept 1 gm BID. Recommend to hold MMF while pt has the flu.  Tacrolimus level 1/10 at goal. Goal 4-7  would hold cellcept right now    acute on ckd  baseline ~ 1.3-1.4  slight IZZY likely sec to prerenal  better today  check UA, urine cr, and urine na  will need optimize dm control    hyponatremia  due to hyperglycemia  optimize dm control  monitor    hypophos  supplement  monitor    Influenza A  care per team   66 yo male with h/o HTN, DM-2, HLD, CAD w/ 1x NATALIE, CABG in 2005, R kidney transplant in 2015 at [facility] on prednisone, prograf and cellcept, presents to ED with 2 day hx of SOB, found to be flu +, desating requiring bipap    Kidney transplant recipient  s/p right kidney transplant 2015 at [facility]  Prograf 1 mg in AM and 0.5 mg in the evening, Prednisone2.5mg BID, and MMF 1 gm po BID. Recommend to hold MMF while pt has the flu.  Tacrolimus level 1/10 at goal. Goal 4-7  would hold cellcept right now    acute on ckd  baseline ~ 1.3-1.4  slight IZZY likely sec to prerenal  better today  check UA, urine cr, and urine na  will need optimize dm control    hyponatremia  due to hyperglycemia  optimize dm control  monitor    hypophos  supplement  monitor    Influenza A  care per team   Walk in

## 2023-01-14 LAB
ALBUMIN SERPL ELPH-MCNC: 3 G/DL — LOW (ref 3.3–5)
ALP SERPL-CCNC: 166 U/L — HIGH (ref 40–120)
ALT FLD-CCNC: 268 U/L — HIGH (ref 4–41)
ANION GAP SERPL CALC-SCNC: 9 MMOL/L — SIGNIFICANT CHANGE UP (ref 7–14)
AST SERPL-CCNC: 83 U/L — HIGH (ref 4–40)
BASOPHILS # BLD AUTO: 0.07 K/UL — SIGNIFICANT CHANGE UP (ref 0–0.2)
BASOPHILS NFR BLD AUTO: 0.8 % — SIGNIFICANT CHANGE UP (ref 0–2)
BILIRUB SERPL-MCNC: 1 MG/DL — SIGNIFICANT CHANGE UP (ref 0.2–1.2)
BUN SERPL-MCNC: 32 MG/DL — HIGH (ref 7–23)
CALCIUM SERPL-MCNC: 9 MG/DL — SIGNIFICANT CHANGE UP (ref 8.4–10.5)
CHLORIDE SERPL-SCNC: 98 MMOL/L — SIGNIFICANT CHANGE UP (ref 98–107)
CO2 SERPL-SCNC: 26 MMOL/L — SIGNIFICANT CHANGE UP (ref 22–31)
CREAT SERPL-MCNC: 1.19 MG/DL — SIGNIFICANT CHANGE UP (ref 0.5–1.3)
CULTURE RESULTS: SIGNIFICANT CHANGE UP
EGFR: 66 ML/MIN/1.73M2 — SIGNIFICANT CHANGE UP
EOSINOPHIL # BLD AUTO: 0.25 K/UL — SIGNIFICANT CHANGE UP (ref 0–0.5)
EOSINOPHIL NFR BLD AUTO: 3 % — SIGNIFICANT CHANGE UP (ref 0–6)
GLUCOSE BLDC GLUCOMTR-MCNC: 134 MG/DL — HIGH (ref 70–99)
GLUCOSE BLDC GLUCOMTR-MCNC: 145 MG/DL — HIGH (ref 70–99)
GLUCOSE BLDC GLUCOMTR-MCNC: 186 MG/DL — HIGH (ref 70–99)
GLUCOSE BLDC GLUCOMTR-MCNC: 84 MG/DL — SIGNIFICANT CHANGE UP (ref 70–99)
GLUCOSE SERPL-MCNC: 137 MG/DL — HIGH (ref 70–99)
HCT VFR BLD CALC: 38.1 % — LOW (ref 39–50)
HGB BLD-MCNC: 12.3 G/DL — LOW (ref 13–17)
IANC: 5.56 K/UL — SIGNIFICANT CHANGE UP (ref 1.8–7.4)
IMM GRANULOCYTES NFR BLD AUTO: 4.6 % — HIGH (ref 0–0.9)
LYMPHOCYTES # BLD AUTO: 1.02 K/UL — SIGNIFICANT CHANGE UP (ref 1–3.3)
LYMPHOCYTES # BLD AUTO: 12.1 % — LOW (ref 13–44)
MAGNESIUM SERPL-MCNC: 1.8 MG/DL — SIGNIFICANT CHANGE UP (ref 1.6–2.6)
MCHC RBC-ENTMCNC: 27.3 PG — SIGNIFICANT CHANGE UP (ref 27–34)
MCHC RBC-ENTMCNC: 32.3 GM/DL — SIGNIFICANT CHANGE UP (ref 32–36)
MCV RBC AUTO: 84.5 FL — SIGNIFICANT CHANGE UP (ref 80–100)
MONOCYTES # BLD AUTO: 1.16 K/UL — HIGH (ref 0–0.9)
MONOCYTES NFR BLD AUTO: 13.7 % — SIGNIFICANT CHANGE UP (ref 2–14)
NEUTROPHILS # BLD AUTO: 5.56 K/UL — SIGNIFICANT CHANGE UP (ref 1.8–7.4)
NEUTROPHILS NFR BLD AUTO: 65.8 % — SIGNIFICANT CHANGE UP (ref 43–77)
NRBC # BLD: 0 /100 WBCS — SIGNIFICANT CHANGE UP (ref 0–0)
NRBC # FLD: 0 K/UL — SIGNIFICANT CHANGE UP (ref 0–0)
PHOSPHATE SERPL-MCNC: 3.4 MG/DL — SIGNIFICANT CHANGE UP (ref 2.5–4.5)
PLATELET # BLD AUTO: 192 K/UL — SIGNIFICANT CHANGE UP (ref 150–400)
POTASSIUM SERPL-MCNC: 4.5 MMOL/L — SIGNIFICANT CHANGE UP (ref 3.5–5.3)
POTASSIUM SERPL-SCNC: 4.5 MMOL/L — SIGNIFICANT CHANGE UP (ref 3.5–5.3)
PROT SERPL-MCNC: 6.2 G/DL — SIGNIFICANT CHANGE UP (ref 6–8.3)
RBC # BLD: 4.51 M/UL — SIGNIFICANT CHANGE UP (ref 4.2–5.8)
RBC # FLD: 15.2 % — HIGH (ref 10.3–14.5)
SODIUM SERPL-SCNC: 133 MMOL/L — LOW (ref 135–145)
SPECIMEN SOURCE: SIGNIFICANT CHANGE UP
WBC # BLD: 8.45 K/UL — SIGNIFICANT CHANGE UP (ref 3.8–10.5)
WBC # FLD AUTO: 8.45 K/UL — SIGNIFICANT CHANGE UP (ref 3.8–10.5)

## 2023-01-14 PROCEDURE — 99232 SBSQ HOSP IP/OBS MODERATE 35: CPT

## 2023-01-14 RX ADMIN — TACROLIMUS 0.5 MILLIGRAM(S): 5 CAPSULE ORAL at 21:42

## 2023-01-14 RX ADMIN — Medication 8 UNIT(S): at 07:52

## 2023-01-14 RX ADMIN — TACROLIMUS 1 MILLIGRAM(S): 5 CAPSULE ORAL at 13:29

## 2023-01-14 RX ADMIN — Medication 1 MILLIGRAM(S): at 11:57

## 2023-01-14 RX ADMIN — CARVEDILOL PHOSPHATE 25 MILLIGRAM(S): 80 CAPSULE, EXTENDED RELEASE ORAL at 05:10

## 2023-01-14 RX ADMIN — Medication 1000 UNIT(S): at 11:57

## 2023-01-14 RX ADMIN — ATORVASTATIN CALCIUM 20 MILLIGRAM(S): 80 TABLET, FILM COATED ORAL at 21:42

## 2023-01-14 RX ADMIN — Medication 2.5 MILLIGRAM(S): at 05:11

## 2023-01-14 RX ADMIN — HEPARIN SODIUM 5000 UNIT(S): 5000 INJECTION INTRAVENOUS; SUBCUTANEOUS at 05:11

## 2023-01-14 RX ADMIN — Medication 8 UNIT(S): at 11:49

## 2023-01-14 RX ADMIN — ISOSORBIDE MONONITRATE 30 MILLIGRAM(S): 60 TABLET, EXTENDED RELEASE ORAL at 11:57

## 2023-01-14 RX ADMIN — Medication 81 MILLIGRAM(S): at 11:57

## 2023-01-14 RX ADMIN — VALGANCICLOVIR 450 MILLIGRAM(S): 450 TABLET, FILM COATED ORAL at 17:58

## 2023-01-14 RX ADMIN — HEPARIN SODIUM 5000 UNIT(S): 5000 INJECTION INTRAVENOUS; SUBCUTANEOUS at 17:58

## 2023-01-14 RX ADMIN — VALGANCICLOVIR 450 MILLIGRAM(S): 450 TABLET, FILM COATED ORAL at 05:10

## 2023-01-14 RX ADMIN — INSULIN GLARGINE 24 UNIT(S): 100 INJECTION, SOLUTION SUBCUTANEOUS at 11:50

## 2023-01-14 RX ADMIN — CARVEDILOL PHOSPHATE 25 MILLIGRAM(S): 80 CAPSULE, EXTENDED RELEASE ORAL at 17:58

## 2023-01-14 NOTE — PROGRESS NOTE ADULT - PROBLEM SELECTOR PLAN 1
-Due to pneumonia   -s/p antibiotics   -Supplemental oxygen, currently on 3L nc and will wean down as tolerated

## 2023-01-14 NOTE — CONSULT NOTE ADULT - CONSULT REQUESTED DATE/TIME
10-Sudeep-2023 10:58
10-Sudeep-2023 11:02
10-Sudeep-2023 11:32
09-Jan-2023 15:42
09-Jan-2023 18:03
14-Jan-2023 07:23

## 2023-01-14 NOTE — PROGRESS NOTE ADULT - ASSESSMENT
68 yo male with h/o HTN, DM-2, HLD, CAD w/ 1x NATALIE, CABG in 2005, R kidney transplant in 2015 at [facility] on prednisone, prograf and cellcept, presents to ED with 2 day hx of SOB, found to be flu +, desating requiring bipap    Kidney transplant recipient  s/p right kidney transplant 2015 at [facility]  Prograf 1 mg in AM and 0.5 mg in the evening, Prednisone2.5mg BID, and MMF 1 gm po BID. Recommend to hold MMF while pt has the flu.  Tacrolimus level 1/10 at goal. Goal 4-7  would hold cellcept right now    IZZY  IZZY likely sec to prerenal  better today  check UA, urine cr, and urine na  will need optimize dm control    hyponatremia  due to hyperglycemia  optimize dm control  monitor    hypophos  supplement  monitor    Influenza A  care per team

## 2023-01-14 NOTE — PROVIDER CONTACT NOTE (OTHER) - ACTION/TREATMENT ORDERED:
Will continue to monitor at this time
ACP made aware. Will continue to monitor HR/rhythm on tele monitoring.
Provider notified. Patient and vital signs assessed. EKG performed. Continue to monitor patient.

## 2023-01-14 NOTE — PROGRESS NOTE ADULT - SUBJECTIVE AND OBJECTIVE BOX
[facility]   MD HUNTER BONILLA MD KRISTINE SOLTANPOUR, DO ANGELA WONG, PA    TEL:  OFFICE: [phone]  From 5pm-7am Answering Service [phone]    -- RENAL FOLLOW UP NOTE ---Date of Service 01-14-23 @ 15:24    Patient is a 67y old  Male who presents with a chief complaint of sob, flu, pneumonia, sepsis (14 Jan 2023 07:22)      Patient seen and examined at bedside. No chest pain/sob    VITALS:  T(F): 97.9 (01-14-23 @ 11:05), Max: 98.7 (01-13-23 @ 17:20)  HR: 73 (01-14-23 @ 11:05)  BP: 133/67 (01-14-23 @ 11:05)  RR: 18 (01-14-23 @ 11:05)  SpO2: 98% (01-14-23 @ 11:05)  Wt(kg): --    01-13 @ 07:01  -  01-14 @ 07:00  --------------------------------------------------------  IN: 0 mL / OUT: 725 mL / NET: -725 mL          PHYSICAL EXAM:  General: NAD  Neck: No JVD  Respiratory: CTAB, no wheezes, rales or rhonchi  Cardiovascular: S1, S2, RRR  Gastrointestinal: BS+, soft, NT/ND  Extremities: No peripheral edema    Hospital Medications:   MEDICATIONS  (STANDING):  aspirin  chewable 81 milliGRAM(s) Oral daily  atorvastatin 20 milliGRAM(s) Oral at bedtime  carvedilol 25 milliGRAM(s) Oral every 12 hours  cholecalciferol 1000 Unit(s) Oral daily  dextrose 5%. 1000 milliLiter(s) (100 mL/Hr) IV Continuous <Continuous>  dextrose 5%. 1000 milliLiter(s) (50 mL/Hr) IV Continuous <Continuous>  dextrose 50% Injectable 25 Gram(s) IV Push once  dextrose 50% Injectable 12.5 Gram(s) IV Push once  dextrose 50% Injectable 25 Gram(s) IV Push once  folic acid 1 milliGRAM(s) Oral daily  glucagon  Injectable 1 milliGRAM(s) IntraMuscular once  heparin   Injectable 5000 Unit(s) SubCutaneous every 12 hours  insulin glargine Injectable (LANTUS) 24 Unit(s) SubCutaneous <User Schedule>  insulin lispro (ADMELOG) corrective regimen sliding scale   SubCutaneous three times a day before meals  insulin lispro (ADMELOG) corrective regimen sliding scale   SubCutaneous at bedtime  insulin lispro Injectable (ADMELOG) 8 Unit(s) SubCutaneous three times a day before meals  isosorbide   mononitrate ER Tablet (IMDUR) 30 milliGRAM(s) Oral daily  predniSONE   Tablet 2.5 milliGRAM(s) Oral daily  tacrolimus 0.5 milliGRAM(s) Oral at bedtime  tacrolimus 1 milliGRAM(s) Oral daily  valGANciclovir 450 milliGRAM(s) Oral two times a day      LABS:  01-14    133<L>  |  98  |  32<H>  ----------------------------<  137<H>  4.5   |  26  |  1.19    Ca    9.0      14 Jan 2023 05:40  Phos  3.4     01-14  Mg     1.80     01-14    TPro  6.2  /  Alb  3.0<L>  /  TBili  1.0  /  DBili      /  AST  83<H>  /  ALT  268<H>  /  AlkPhos  166<H>  01-14    Creatinine Trend: 1.19 <--, 1.29 <--, 1.31 <--, 1.54 <--, 1.41 <--, 1.59 <--, 1.72 <--    Albumin, Serum: 3.0 g/dL (01-14 @ 05:40)  Phosphorus Level, Serum: 3.4 mg/dL (01-14 @ 05:40)                              12.3   8.45  )-----------( 192      ( 14 Jan 2023 05:40 )             38.1     Urine Studies:      Vitamin D (25OH) 36.9      [01-11-23 @ 06:30]  TSH 1.42      [01-09-23 @ 09:50]    HBsAg Nonreact      [01-09-23 @ 09:50]  HCV 0.17, Nonreact      [01-10-23 @ 06:00]    SHOAIB: titer Negative, pattern --      [01-12-23 @ 05:00]    RADIOLOGY & ADDITIONAL STUDIES:

## 2023-01-14 NOTE — PROVIDER CONTACT NOTE (OTHER) - RECOMMENDATIONS
Provider notified. Patient and vital signs assessed. EKG performed. Continue to monitor patient.
ACP Christian Hunter made aware

## 2023-01-14 NOTE — PROGRESS NOTE ADULT - PROBLEM SELECTOR PLAN 6
-improving, suspect DILI   -hepatitis panel neg  -RUQ Ultrasound showed no evidence for intrahepatic or extrahepatic biliary ductal dilatation. No gallstones, gallbladder wall thickening or pericholecystic fluid. avoid hepatotoxins  -trend LFT daily  -Hepatology following   -SHOAIB and EBV PCR neg   -F/up ASMA  -awaiting MRI abdomen with contrast

## 2023-01-14 NOTE — CONSULT NOTE ADULT - REASON FOR ADMISSION
sob, flu, pneumonia, sepsis

## 2023-01-14 NOTE — CONSULT NOTE ADULT - CONSULT REASON
elongated nails, DM exam
Elevated liver tests
Flu
kidney transplant
Transplant DM
Kidney transplant recipient

## 2023-01-14 NOTE — PROVIDER CONTACT NOTE (OTHER) - BACKGROUND
Patient admitted for pneumonia, patient flu +. PMH DM, HTN, CAD. PSH kidney transplant
(Admit Diagnosis) Pneumonia due to infectious organism  (PMH) High cholesterol  (PMH) HTN (hypertension)  (PMH) DM (diabetes mellitus)
Patient is a 59yo male admitted for pneumonia due to infectious organism with hx of high cholesterol, HTN, DM and CAD.

## 2023-01-14 NOTE — PROVIDER CONTACT NOTE (OTHER) - REASON
4 beats vtach
Patient had 3 beats of vtach followed by 7 beats a few seconds later.
Patient had 3 beats of vtach

## 2023-01-14 NOTE — PROGRESS NOTE ADULT - ASSESSMENT
66 yo male with h/o HTN, DM-2, HLD, CAD w/ 1x NTAALIE, CABG in 2005, R kidney transplant in 2015 at [facility] on prednisone, prograf and cellcept, presents to ED with 2 day hx of SOB, cough, hypoxia, found to have influenza and RLL pneumonia

## 2023-01-14 NOTE — CONSULT NOTE ADULT - ASSESSMENT
67 year old male w onychomycosis, DMII  -pt eval, chart reviewed  -routine DM pedal exam WNL   -Debridement of elongated nails x 10 - pain resolved sp debridement   -DM pedal education   -no other acute podiatric issues  -please reconsult as needed

## 2023-01-14 NOTE — PROGRESS NOTE ADULT - SUBJECTIVE AND OBJECTIVE BOX
PROGRESS NOTE:     Patient is a 67y old  Male who presents with a chief complaint of sob, flu, pneumonia, sepsis (14 Jan 2023 15:23)      SUBJECTIVE / OVERNIGHT EVENTS: No complaints.     ADDITIONAL REVIEW OF SYSTEMS:    MEDICATIONS  (STANDING):  aspirin  chewable 81 milliGRAM(s) Oral daily  atorvastatin 20 milliGRAM(s) Oral at bedtime  carvedilol 25 milliGRAM(s) Oral every 12 hours  cholecalciferol 1000 Unit(s) Oral daily  dextrose 5%. 1000 milliLiter(s) (100 mL/Hr) IV Continuous <Continuous>  dextrose 5%. 1000 milliLiter(s) (50 mL/Hr) IV Continuous <Continuous>  dextrose 50% Injectable 25 Gram(s) IV Push once  dextrose 50% Injectable 12.5 Gram(s) IV Push once  dextrose 50% Injectable 25 Gram(s) IV Push once  folic acid 1 milliGRAM(s) Oral daily  glucagon  Injectable 1 milliGRAM(s) IntraMuscular once  heparin   Injectable 5000 Unit(s) SubCutaneous every 12 hours  insulin glargine Injectable (LANTUS) 24 Unit(s) SubCutaneous <User Schedule>  insulin lispro (ADMELOG) corrective regimen sliding scale   SubCutaneous three times a day before meals  insulin lispro (ADMELOG) corrective regimen sliding scale   SubCutaneous at bedtime  insulin lispro Injectable (ADMELOG) 8 Unit(s) SubCutaneous three times a day before meals  isosorbide   mononitrate ER Tablet (IMDUR) 30 milliGRAM(s) Oral daily  predniSONE   Tablet 2.5 milliGRAM(s) Oral daily  tacrolimus 0.5 milliGRAM(s) Oral at bedtime  tacrolimus 1 milliGRAM(s) Oral daily  valGANciclovir 450 milliGRAM(s) Oral two times a day    MEDICATIONS  (PRN):  acetaminophen     Tablet .. 650 milliGRAM(s) Oral every 6 hours PRN Temp greater or equal to 38C (100.4F), Mild Pain (1 - 3)  dextrose Oral Gel 15 Gram(s) Oral once PRN Blood Glucose LESS THAN 70 milliGRAM(s)/deciliter      CAPILLARY BLOOD GLUCOSE      POCT Blood Glucose.: 84 mg/dL (14 Jan 2023 16:36)  POCT Blood Glucose.: 145 mg/dL (14 Jan 2023 11:38)  POCT Blood Glucose.: 134 mg/dL (14 Jan 2023 07:43)  POCT Blood Glucose.: 147 mg/dL (13 Jan 2023 21:18)  POCT Blood Glucose.: 223 mg/dL (13 Jan 2023 17:30)    I&O's Summary    13 Jan 2023 07:01  -  14 Jan 2023 07:00  --------------------------------------------------------  IN: 0 mL / OUT: 725 mL / NET: -725 mL        PHYSICAL EXAM:  Vital Signs Last 24 Hrs  T(C): 36.6 (14 Jan 2023 11:05), Max: 36.9 (13 Jan 2023 22:52)  T(F): 97.9 (14 Jan 2023 11:05), Max: 98.4 (13 Jan 2023 22:52)  HR: 73 (14 Jan 2023 11:05) (70 - 79)  BP: 133/67 (14 Jan 2023 11:05) (128/68 - 152/78)  BP(mean): --  RR: 18 (14 Jan 2023 11:05) (16 - 20)  SpO2: 98% (14 Jan 2023 11:05) (96% - 100%)    Parameters below as of 14 Jan 2023 11:05  Patient On (Oxygen Delivery Method): nasal cannula  O2 Flow (L/min): 3      EYES: EOMI, PERRLA, conjunctiva and sclera clear  NECK: Supple, No JVD  CHEST/LUNG: right basilar crackle and decreased BS, otherwise clear  HEART: Regular rate and rhythm; No murmurs, rubs, or gallops  ABDOMEN: Soft, Nontender, Nondistended; Bowel sounds present  EXTREMITIES:  2+ Peripheral Pulses, No clubbing, cyanosis, or edema  PSYCH: AAOx3  NEUROLOGY: non-focal, CN grossly intact  SKIN: No rashes or lesions    LABS:                        12.3   8.45  )-----------( 192      ( 14 Jan 2023 05:40 )             38.1     01-14    133<L>  |  98  |  32<H>  ----------------------------<  137<H>  4.5   |  26  |  1.19    Ca    9.0      14 Jan 2023 05:40  Phos  3.4     01-14  Mg     1.80     01-14    TPro  6.2  /  Alb  3.0<L>  /  TBili  1.0  /  DBili  x   /  AST  83<H>  /  ALT  268<H>  /  AlkPhos  166<H>  01-14    PT/INR - ( 13 Jan 2023 06:45 )   PT: 15.6 sec;   INR: 1.34 ratio         PTT - ( 13 Jan 2023 06:45 )  PTT:34.1 sec            RADIOLOGY & ADDITIONAL TESTS:  Results Reviewed:   Imaging Personally Reviewed:  Electrocardiogram Personally Reviewed:    COORDINATION OF CARE:  Care Discussed with Consultants/Other Providers [Y/N]:  Prior or Outpatient Records Reviewed [Y/N]:

## 2023-01-14 NOTE — PROGRESS NOTE ADULT - PROBLEM SELECTOR PLAN 8
Poorly controlled transplant DM with steroid-induced hyperglycemia  Endo consult appreciated  A1c 7.7%  Continue ISS  Increased Lantus 24 units daily   Continue Admelog 8 units TID w/ meals   Monitor FS

## 2023-01-14 NOTE — PROGRESS NOTE ADULT - PROBLEM SELECTOR PLAN 2
-sepsis present on admission d/t influenza and RLL pneumonia. Sepsis resolved   -on admission WBC 14.9, elevated lactate 6 -->3.4 after fluid bolus  -s/p Ceftriaxone/Zithromax  -s/p Tamiflu   -urine legionella Ag neg   -LE doppler w/ no evidence of deep vein thrombosis in the right and left lower extremities  -Blood culture showed staph epi, likely contaminant

## 2023-01-14 NOTE — PROVIDER CONTACT NOTE (OTHER) - SITUATION
Patient had 3 beats of vtach
patient NSR on tele. patient had 4 beats vtach while ambulating to the bathroom. patient asymptomatic and is currently back in bed. will continue to [facility] at this time
Patient had 3 beats of vtach followed by 7 beats a few seconds later.

## 2023-01-14 NOTE — CONSULT NOTE ADULT - SUBJECTIVE AND OBJECTIVE BOX
HPI:  68 yo male with h/o HTN, DM-2, HLD, CAD w/ 1x NATALIE, CABG in 2005, R kidney transplant in 2015 at [facility] on prednisone, prograf and cellcept, presents to ED with 2 day hx of SOB, a/w cough, +sick contact with family members have flu, denies chest pain/fever/chill. In ED, he was hypoxic to 88% on RA with increased WOB, improved to 96% on 4L NC. He was put on BIPAP for increased WOB, evaluated and rejected by ICU, BIPAP adjusted down to 10/5 and 30% per ICU recs. Pt denies hx of CHF. Lab showed WBC 14, Hgb 11.7, Cr 1.7, pro-BNP 6238, transaminitis ast/alt 229/289. ,   (09 Jan 2023 09:52)    Patient is a 67y old  Male who presents with a chief complaint of sob, flu, pneumonia, sepsis (13 Jan 2023 13:45)    Allergies    No Known Allergies    Intolerances      Vital Signs Last 24 Hrs  T(C): 36.9 (14 Jan 2023 05:08), Max: 37.1 (13 Jan 2023 17:20)  T(F): 98.4 (14 Jan 2023 05:08), Max: 98.7 (13 Jan 2023 17:20)  HR: 72 (14 Jan 2023 05:08) (70 - 79)  BP: 143/71 (14 Jan 2023 05:08) (128/68 - 154/70)  BP(mean): --  RR: 17 (14 Jan 2023 05:08) (16 - 20)  SpO2: 97% (14 Jan 2023 05:08) (96% - 100%)    Parameters below as of 14 Jan 2023 05:08  Patient On (Oxygen Delivery Method): nasal cannula  O2 Flow (L/min): 3                          12.3   8.45  )-----------( 192      ( 14 Jan 2023 05:40 )             38.1           CAPILLARY BLOOD GLUCOSE      POCT Blood Glucose.: 147 mg/dL (13 Jan 2023 21:18)    MEDICATIONS  (STANDING):  aspirin  chewable 81 milliGRAM(s) Oral daily  atorvastatin 20 milliGRAM(s) Oral at bedtime  carvedilol 25 milliGRAM(s) Oral every 12 hours  cholecalciferol 1000 Unit(s) Oral daily  dextrose 5%. 1000 milliLiter(s) (100 mL/Hr) IV Continuous <Continuous>  dextrose 5%. 1000 milliLiter(s) (50 mL/Hr) IV Continuous <Continuous>  dextrose 50% Injectable 25 Gram(s) IV Push once  dextrose 50% Injectable 12.5 Gram(s) IV Push once  dextrose 50% Injectable 25 Gram(s) IV Push once  folic acid 1 milliGRAM(s) Oral daily  glucagon  Injectable 1 milliGRAM(s) IntraMuscular once  heparin   Injectable 5000 Unit(s) SubCutaneous every 12 hours  insulin glargine Injectable (LANTUS) 24 Unit(s) SubCutaneous <User Schedule>  insulin lispro (ADMELOG) corrective regimen sliding scale   SubCutaneous three times a day before meals  insulin lispro (ADMELOG) corrective regimen sliding scale   SubCutaneous at bedtime  insulin lispro Injectable (ADMELOG) 8 Unit(s) SubCutaneous three times a day before meals  isosorbide   mononitrate ER Tablet (IMDUR) 30 milliGRAM(s) Oral daily  predniSONE   Tablet 2.5 milliGRAM(s) Oral daily  tacrolimus 0.5 milliGRAM(s) Oral at bedtime  tacrolimus 1 milliGRAM(s) Oral daily  valGANciclovir 450 milliGRAM(s) Oral two times a day    MEDICATIONS  (PRN):  acetaminophen     Tablet .. 650 milliGRAM(s) Oral every 6 hours PRN Temp greater or equal to 38C (100.4F), Mild Pain (1 - 3)  dextrose Oral Gel 15 Gram(s) Oral once PRN Blood Glucose LESS THAN 70 milliGRAM(s)/deciliter    PAST MEDICAL & SURGICAL HISTORY:  CAD (coronary artery disease)      DM (diabetes mellitus)  Type 2      HTN (hypertension)      High cholesterol      S/P triple vessel bypass      S/P kidney transplant        ISAURO:  derm: Elongated, ingrowing, discolored nails x 10   no open lesions or local signs of infection     vasc: pedal pulses palp 2/4 bl, cap fill time instant to all digits    ortho: no gross deformities    neuro: epicritic sensation diminished

## 2023-01-14 NOTE — PROVIDER CONTACT NOTE (OTHER) - ASSESSMENT
Patient is asymptomatic and resting comfortably in bed. VS include: T 98.4 HR 79 O2 100% on 3L NC, R 18 and /78.
Patient A&Ox4, VS as documented, NSR had 3 beats of vtach on tele. Patient denies chest pain and SOB. Patient on Bipap and . Patient asymptomatic, resting comfortably in bed at this time. Blood glucose monitored.
patient NSR on tele. patient had 4 beats vtach while ambulating to the bathroom. patient asymptomatic and is currently back in bed. will continue to [facility] at this time

## 2023-01-15 LAB
ALBUMIN SERPL ELPH-MCNC: 3 G/DL — LOW (ref 3.3–5)
ALP SERPL-CCNC: 158 U/L — HIGH (ref 40–120)
ALT FLD-CCNC: 226 U/L — HIGH (ref 4–41)
ANION GAP SERPL CALC-SCNC: 10 MMOL/L — SIGNIFICANT CHANGE UP (ref 7–14)
AST SERPL-CCNC: 84 U/L — HIGH (ref 4–40)
BASOPHILS # BLD AUTO: 0.08 K/UL — SIGNIFICANT CHANGE UP (ref 0–0.2)
BASOPHILS NFR BLD AUTO: 1.2 % — SIGNIFICANT CHANGE UP (ref 0–2)
BILIRUB SERPL-MCNC: 0.9 MG/DL — SIGNIFICANT CHANGE UP (ref 0.2–1.2)
BUN SERPL-MCNC: 32 MG/DL — HIGH (ref 7–23)
CALCIUM SERPL-MCNC: 9.3 MG/DL — SIGNIFICANT CHANGE UP (ref 8.4–10.5)
CHLORIDE SERPL-SCNC: 99 MMOL/L — SIGNIFICANT CHANGE UP (ref 98–107)
CO2 SERPL-SCNC: 25 MMOL/L — SIGNIFICANT CHANGE UP (ref 22–31)
CREAT SERPL-MCNC: 1.15 MG/DL — SIGNIFICANT CHANGE UP (ref 0.5–1.3)
CULTURE RESULTS: SIGNIFICANT CHANGE UP
CULTURE RESULTS: SIGNIFICANT CHANGE UP
EGFR: 69 ML/MIN/1.73M2 — SIGNIFICANT CHANGE UP
EOSINOPHIL # BLD AUTO: 0.21 K/UL — SIGNIFICANT CHANGE UP (ref 0–0.5)
EOSINOPHIL NFR BLD AUTO: 3.1 % — SIGNIFICANT CHANGE UP (ref 0–6)
GLUCOSE BLDC GLUCOMTR-MCNC: 104 MG/DL — HIGH (ref 70–99)
GLUCOSE BLDC GLUCOMTR-MCNC: 113 MG/DL — HIGH (ref 70–99)
GLUCOSE BLDC GLUCOMTR-MCNC: 135 MG/DL — HIGH (ref 70–99)
GLUCOSE BLDC GLUCOMTR-MCNC: 155 MG/DL — HIGH (ref 70–99)
GLUCOSE SERPL-MCNC: 140 MG/DL — HIGH (ref 70–99)
HCT VFR BLD CALC: 38.3 % — LOW (ref 39–50)
HGB BLD-MCNC: 12.2 G/DL — LOW (ref 13–17)
IANC: 3.92 K/UL — SIGNIFICANT CHANGE UP (ref 1.8–7.4)
IMM GRANULOCYTES NFR BLD AUTO: 5.7 % — HIGH (ref 0–0.9)
INR BLD: 1.23 RATIO — HIGH (ref 0.88–1.16)
LYMPHOCYTES # BLD AUTO: 1.04 K/UL — SIGNIFICANT CHANGE UP (ref 1–3.3)
LYMPHOCYTES # BLD AUTO: 15.2 % — SIGNIFICANT CHANGE UP (ref 13–44)
MAGNESIUM SERPL-MCNC: 2 MG/DL — SIGNIFICANT CHANGE UP (ref 1.6–2.6)
MCHC RBC-ENTMCNC: 27.1 PG — SIGNIFICANT CHANGE UP (ref 27–34)
MCHC RBC-ENTMCNC: 31.9 GM/DL — LOW (ref 32–36)
MCV RBC AUTO: 84.9 FL — SIGNIFICANT CHANGE UP (ref 80–100)
MONOCYTES # BLD AUTO: 1.18 K/UL — HIGH (ref 0–0.9)
MONOCYTES NFR BLD AUTO: 17.3 % — HIGH (ref 2–14)
NEUTROPHILS # BLD AUTO: 3.92 K/UL — SIGNIFICANT CHANGE UP (ref 1.8–7.4)
NEUTROPHILS NFR BLD AUTO: 57.5 % — SIGNIFICANT CHANGE UP (ref 43–77)
NRBC # BLD: 0 /100 WBCS — SIGNIFICANT CHANGE UP (ref 0–0)
NRBC # FLD: 0 K/UL — SIGNIFICANT CHANGE UP (ref 0–0)
PHOSPHATE SERPL-MCNC: 3.6 MG/DL — SIGNIFICANT CHANGE UP (ref 2.5–4.5)
PLATELET # BLD AUTO: 198 K/UL — SIGNIFICANT CHANGE UP (ref 150–400)
POTASSIUM SERPL-MCNC: 4.7 MMOL/L — SIGNIFICANT CHANGE UP (ref 3.5–5.3)
POTASSIUM SERPL-SCNC: 4.7 MMOL/L — SIGNIFICANT CHANGE UP (ref 3.5–5.3)
PROT SERPL-MCNC: 6.3 G/DL — SIGNIFICANT CHANGE UP (ref 6–8.3)
PROTHROM AB SERPL-ACNC: 14.3 SEC — HIGH (ref 10.5–13.4)
RBC # BLD: 4.51 M/UL — SIGNIFICANT CHANGE UP (ref 4.2–5.8)
RBC # FLD: 15.3 % — HIGH (ref 10.3–14.5)
SMOOTH MUSCLE AB SER-ACNC: ABNORMAL
SODIUM SERPL-SCNC: 134 MMOL/L — LOW (ref 135–145)
SPECIMEN SOURCE: SIGNIFICANT CHANGE UP
SPECIMEN SOURCE: SIGNIFICANT CHANGE UP
WBC # BLD: 6.82 K/UL — SIGNIFICANT CHANGE UP (ref 3.8–10.5)
WBC # FLD AUTO: 6.82 K/UL — SIGNIFICANT CHANGE UP (ref 3.8–10.5)

## 2023-01-15 PROCEDURE — 99232 SBSQ HOSP IP/OBS MODERATE 35: CPT

## 2023-01-15 RX ADMIN — Medication 8 UNIT(S): at 11:33

## 2023-01-15 RX ADMIN — INSULIN GLARGINE 24 UNIT(S): 100 INJECTION, SOLUTION SUBCUTANEOUS at 11:34

## 2023-01-15 RX ADMIN — ATORVASTATIN CALCIUM 20 MILLIGRAM(S): 80 TABLET, FILM COATED ORAL at 21:30

## 2023-01-15 RX ADMIN — Medication 81 MILLIGRAM(S): at 11:32

## 2023-01-15 RX ADMIN — VALGANCICLOVIR 450 MILLIGRAM(S): 450 TABLET, FILM COATED ORAL at 05:15

## 2023-01-15 RX ADMIN — CARVEDILOL PHOSPHATE 25 MILLIGRAM(S): 80 CAPSULE, EXTENDED RELEASE ORAL at 17:59

## 2023-01-15 RX ADMIN — HEPARIN SODIUM 5000 UNIT(S): 5000 INJECTION INTRAVENOUS; SUBCUTANEOUS at 05:15

## 2023-01-15 RX ADMIN — TACROLIMUS 0.5 MILLIGRAM(S): 5 CAPSULE ORAL at 21:30

## 2023-01-15 RX ADMIN — Medication 8 UNIT(S): at 07:55

## 2023-01-15 RX ADMIN — Medication 1 MILLIGRAM(S): at 11:32

## 2023-01-15 RX ADMIN — ISOSORBIDE MONONITRATE 30 MILLIGRAM(S): 60 TABLET, EXTENDED RELEASE ORAL at 11:37

## 2023-01-15 RX ADMIN — Medication 1000 UNIT(S): at 11:32

## 2023-01-15 RX ADMIN — TACROLIMUS 1 MILLIGRAM(S): 5 CAPSULE ORAL at 11:33

## 2023-01-15 RX ADMIN — Medication 8 UNIT(S): at 17:54

## 2023-01-15 RX ADMIN — CARVEDILOL PHOSPHATE 25 MILLIGRAM(S): 80 CAPSULE, EXTENDED RELEASE ORAL at 05:16

## 2023-01-15 RX ADMIN — HEPARIN SODIUM 5000 UNIT(S): 5000 INJECTION INTRAVENOUS; SUBCUTANEOUS at 18:00

## 2023-01-15 RX ADMIN — Medication 2.5 MILLIGRAM(S): at 05:15

## 2023-01-15 RX ADMIN — VALGANCICLOVIR 450 MILLIGRAM(S): 450 TABLET, FILM COATED ORAL at 17:55

## 2023-01-15 NOTE — PROGRESS NOTE ADULT - SUBJECTIVE AND OBJECTIVE BOX
[facility]   MD HUNTER BONILLA MD RUORU WONG, PA KRISTINE SOLTANPOUR, DO    TEL:  OFFICE: [phone]      RENAL FOLLOW UP NOTE-- Date of Service ;; 01-15-23 @ 17:16  --------------------------------------------------------------------------------  HPI:  Pt seen and examined at bedside  Denies SOB, chest pain.         PAST HISTORY  --------------------------------------------------------------------------------  No significant changes to PMH, PSH, FHx, SHx, unless otherwise noted    ALLERGIES & MEDICATIONS  --------------------------------------------------------------------------------  Allergies    No Known Allergies    Intolerances      Standing Inpatient Medications  aspirin  chewable 81 milliGRAM(s) Oral daily  atorvastatin 20 milliGRAM(s) Oral at bedtime  carvedilol 25 milliGRAM(s) Oral every 12 hours  cholecalciferol 1000 Unit(s) Oral daily  dextrose 5%. 1000 milliLiter(s) IV Continuous <Continuous>  dextrose 5%. 1000 milliLiter(s) IV Continuous <Continuous>  dextrose 50% Injectable 25 Gram(s) IV Push once  dextrose 50% Injectable 12.5 Gram(s) IV Push once  dextrose 50% Injectable 25 Gram(s) IV Push once  folic acid 1 milliGRAM(s) Oral daily  glucagon  Injectable 1 milliGRAM(s) IntraMuscular once  heparin   Injectable 5000 Unit(s) SubCutaneous every 12 hours  insulin glargine Injectable (LANTUS) 24 Unit(s) SubCutaneous <User Schedule>  insulin lispro (ADMELOG) corrective regimen sliding scale   SubCutaneous three times a day before meals  insulin lispro (ADMELOG) corrective regimen sliding scale   SubCutaneous at bedtime  insulin lispro Injectable (ADMELOG) 8 Unit(s) SubCutaneous three times a day before meals  isosorbide   mononitrate ER Tablet (IMDUR) 30 milliGRAM(s) Oral daily  predniSONE   Tablet 2.5 milliGRAM(s) Oral daily  tacrolimus 0.5 milliGRAM(s) Oral at bedtime  tacrolimus 1 milliGRAM(s) Oral daily  valGANciclovir 450 milliGRAM(s) Oral two times a day    PRN Inpatient Medications  acetaminophen     Tablet .. 650 milliGRAM(s) Oral every 6 hours PRN  dextrose Oral Gel 15 Gram(s) Oral once PRN      REVIEW OF SYSTEMS  --------------------------------------------------------------------------------  General: no fever  CVS: no chest pain  RESP: no sob, no cough  ABD: no abdominal pain  : no dysuria,  MSK: no edema     VITALS/PHYSICAL EXAM  --------------------------------------------------------------------------------  T(C): 36.7 (01-15-23 @ 11:30), Max: 36.8 (01-15-23 @ 05:10)  HR: 72 (01-15-23 @ 11:30) (69 - 77)  BP: 121/62 (01-15-23 @ 11:30) (111/79 - 139/76)  RR: 18 (01-15-23 @ 11:30) (16 - 18)  SpO2: 98% (01-15-23 @ 11:30) (98% - 99%)  Wt(kg): --        Physical Exam:  	Gen: NAD  	HEENT: MMM  	Pulm: CTA B/L  	CV: S1S2  	Abd: Soft, +BS  	Ext: No LE edema B/L                      Neuro: Awake , alert  	Skin: Warm and Dry   	Vascular access: None           : Voiding  LABS/STUDIES  --------------------------------------------------------------------------------              12.2   6.82  >-----------<  198      [01-15-23 @ 06:00]              38.3     134  |  99  |  32  ----------------------------<  140      [01-15-23 @ 06:00]  4.7   |  25  |  1.15        Ca     9.3     [01-15-23 @ 06:00]      Mg     2.00     [01-15-23 @ 06:00]      Phos  3.6     [01-15-23 @ 06:00]    TPro  6.3  /  Alb  3.0  /  TBili  0.9  /  DBili  x   /  AST  84  /  ALT  226  /  AlkPhos  158  [01-15-23 @ 06:00]    PT/INR: PT 14.3 , INR 1.23       [01-15-23 @ 06:00]      Creatinine Trend:  SCr 1.15 [01-15 @ 06:00]  SCr 1.19 [01-14 @ 05:40]  SCr 1.29 [01-12 @ 05:00]  SCr 1.31 [01-11 @ 06:30]  SCr 1.54 [01-10 @ 06:00]        Vitamin D (25OH) 36.9      [01-11-23 @ 06:30]  TSH 1.42      [01-09-23 @ 09:50]    HBsAg Nonreact      [01-09-23 @ 09:50]  HCV 0.17, Nonreact      [01-10-23 @ 06:00]    SHOAIB: titer Negative, pattern --      [01-12-23 @ 05:00]

## 2023-01-15 NOTE — PROGRESS NOTE ADULT - PROBLEM SELECTOR PLAN 1
-Due to pneumonia   -s/p antibiotics   -Supplemental oxygen, currently on 2L nc and continue to wean down as tolerated

## 2023-01-15 NOTE — PROGRESS NOTE ADULT - ASSESSMENT
66 yo male with h/o HTN, DM-2, HLD, CAD w/ 1x NATALIE, CABG in 2005, R kidney transplant in 2015 at [facility] on prednisone, prograf and cellcept, presents to ED with 2 day hx of SOB, found to be flu +, desating requiring bipap    Kidney transplant recipient  s/p right kidney transplant 2015 at [facility]  Prograf 1 mg in AM and 0.5 mg in the evening, Prednisone2.5mg BID, and MMF 1 gm po BID. Recommend to hold MMF while pt has the flu.  Tacrolimus level 1/10 at goal. Goal 4-7  would hold cellcept right now    IZZY  IZZY likely sec to prerenal  Improved  check UA, urine cr, and urine na  will need optimize dm control    hyponatremia  Improving  due to hyperglycemia  optimize dm control  monitor    hypophos  supplement as needed  monitor    Influenza A  care per team

## 2023-01-15 NOTE — PROGRESS NOTE ADULT - SUBJECTIVE AND OBJECTIVE BOX
PROGRESS NOTE:     Patient is a 67y old  Male who presents with a chief complaint of sob, flu, pneumonia, sepsis (14 Jan 2023 17:28)      SUBJECTIVE / OVERNIGHT EVENTS: No new complaints.     ADDITIONAL REVIEW OF SYSTEMS:    MEDICATIONS  (STANDING):  aspirin  chewable 81 milliGRAM(s) Oral daily  atorvastatin 20 milliGRAM(s) Oral at bedtime  carvedilol 25 milliGRAM(s) Oral every 12 hours  cholecalciferol 1000 Unit(s) Oral daily  dextrose 5%. 1000 milliLiter(s) (100 mL/Hr) IV Continuous <Continuous>  dextrose 5%. 1000 milliLiter(s) (50 mL/Hr) IV Continuous <Continuous>  dextrose 50% Injectable 25 Gram(s) IV Push once  dextrose 50% Injectable 12.5 Gram(s) IV Push once  dextrose 50% Injectable 25 Gram(s) IV Push once  folic acid 1 milliGRAM(s) Oral daily  glucagon  Injectable 1 milliGRAM(s) IntraMuscular once  heparin   Injectable 5000 Unit(s) SubCutaneous every 12 hours  insulin glargine Injectable (LANTUS) 24 Unit(s) SubCutaneous <User Schedule>  insulin lispro (ADMELOG) corrective regimen sliding scale   SubCutaneous three times a day before meals  insulin lispro (ADMELOG) corrective regimen sliding scale   SubCutaneous at bedtime  insulin lispro Injectable (ADMELOG) 8 Unit(s) SubCutaneous three times a day before meals  isosorbide   mononitrate ER Tablet (IMDUR) 30 milliGRAM(s) Oral daily  predniSONE   Tablet 2.5 milliGRAM(s) Oral daily  tacrolimus 0.5 milliGRAM(s) Oral at bedtime  tacrolimus 1 milliGRAM(s) Oral daily  valGANciclovir 450 milliGRAM(s) Oral two times a day    MEDICATIONS  (PRN):  acetaminophen     Tablet .. 650 milliGRAM(s) Oral every 6 hours PRN Temp greater or equal to 38C (100.4F), Mild Pain (1 - 3)  dextrose Oral Gel 15 Gram(s) Oral once PRN Blood Glucose LESS THAN 70 milliGRAM(s)/deciliter      CAPILLARY BLOOD GLUCOSE      POCT Blood Glucose.: 113 mg/dL (15 Sudeep 2023 11:12)  POCT Blood Glucose.: 135 mg/dL (15 Sudeep 2023 07:42)  POCT Blood Glucose.: 186 mg/dL (14 Jan 2023 21:32)  POCT Blood Glucose.: 84 mg/dL (14 Jan 2023 16:36)    I&O's Summary      PHYSICAL EXAM:  Vital Signs Last 24 Hrs  T(C): 36.7 (15 Sudeep 2023 11:30), Max: 36.8 (15 Sudeep 2023 05:10)  T(F): 98.1 (15 Sudeep 2023 11:30), Max: 98.2 (15 Sudeep 2023 05:10)  HR: 72 (15 Sudeep 2023 11:30) (69 - 77)  BP: 121/62 (15 Sudeep 2023 11:30) (111/79 - 139/76)  BP(mean): --  RR: 18 (15 Sudeep 2023 11:30) (16 - 18)  SpO2: 98% (15 Sudeep 2023 11:30) (98% - 99%)    Parameters below as of 15 Sudeep 2023 11:30  Patient On (Oxygen Delivery Method): nasal cannula  O2 Flow (L/min): 3      EYES: EOMI, PERRLA, conjunctiva and sclera clear  NECK: Supple, No JVD  CHEST/LUNG: right basilar crackle and decreased BS, otherwise clear  HEART: Regular rate and rhythm; No murmurs, rubs, or gallops  ABDOMEN: Soft, Nontender, Nondistended; Bowel sounds present  EXTREMITIES:  2+ Peripheral Pulses, No clubbing, cyanosis, or edema  PSYCH: AAOx3  NEUROLOGY: non-focal, CN grossly intact  SKIN: No rashes or lesions    LABS:                        12.2   6.82  )-----------( 198      ( 15 Sudeep 2023 06:00 )             38.3     01-15    134<L>  |  99  |  32<H>  ----------------------------<  140<H>  4.7   |  25  |  1.15    Ca    9.3      15 Sudeep 2023 06:00  Phos  3.6     01-15  Mg     2.00     01-15    TPro  6.3  /  Alb  3.0<L>  /  TBili  0.9  /  DBili  x   /  AST  84<H>  /  ALT  226<H>  /  AlkPhos  158<H>  01-15    PT/INR - ( 15 Sudeep 2023 06:00 )   PT: 14.3 sec;   INR: 1.23 ratio                     RADIOLOGY & ADDITIONAL TESTS:  Results Reviewed:   Imaging Personally Reviewed:  Electrocardiogram Personally Reviewed:    COORDINATION OF CARE:  Care Discussed with Consultants/Other Providers [Y/N]:  Prior or Outpatient Records Reviewed [Y/N]:

## 2023-01-15 NOTE — PROGRESS NOTE ADULT - ASSESSMENT
66 yo male with h/o HTN, DM-2, HLD, CAD w/ 1x NATALIE, CABG in 2005, R kidney transplant in 2015 at [facility] on prednisone, prograf and cellcept, presents to ED with 2 day hx of SOB, cough, hypoxia, found to have influenza and RLL pneumonia

## 2023-01-16 ENCOUNTER — TRANSCRIPTION ENCOUNTER (OUTPATIENT)
Age: 68
End: 2023-01-16

## 2023-01-16 VITALS
HEART RATE: 71 BPM | TEMPERATURE: 98 F | RESPIRATION RATE: 17 BRPM | SYSTOLIC BLOOD PRESSURE: 125 MMHG | OXYGEN SATURATION: 97 % | DIASTOLIC BLOOD PRESSURE: 64 MMHG

## 2023-01-16 DIAGNOSIS — E09.9 DRUG OR CHEMICAL INDUCED DIABETES MELLITUS WITHOUT COMPLICATIONS: ICD-10-CM

## 2023-01-16 LAB
ALBUMIN SERPL ELPH-MCNC: 3 G/DL — LOW (ref 3.3–5)
ALP SERPL-CCNC: 147 U/L — HIGH (ref 40–120)
ALT FLD-CCNC: 179 U/L — HIGH (ref 4–41)
ANION GAP SERPL CALC-SCNC: 8 MMOL/L — SIGNIFICANT CHANGE UP (ref 7–14)
AST SERPL-CCNC: 74 U/L — HIGH (ref 4–40)
BASOPHILS # BLD AUTO: 0.09 K/UL — SIGNIFICANT CHANGE UP (ref 0–0.2)
BASOPHILS NFR BLD AUTO: 1.4 % — SIGNIFICANT CHANGE UP (ref 0–2)
BILIRUB SERPL-MCNC: 0.9 MG/DL — SIGNIFICANT CHANGE UP (ref 0.2–1.2)
BUN SERPL-MCNC: 32 MG/DL — HIGH (ref 7–23)
CALCIUM SERPL-MCNC: 9.5 MG/DL — SIGNIFICANT CHANGE UP (ref 8.4–10.5)
CHLORIDE SERPL-SCNC: 99 MMOL/L — SIGNIFICANT CHANGE UP (ref 98–107)
CO2 SERPL-SCNC: 28 MMOL/L — SIGNIFICANT CHANGE UP (ref 22–31)
CREAT SERPL-MCNC: 1.31 MG/DL — HIGH (ref 0.5–1.3)
EGFR: 59 ML/MIN/1.73M2 — LOW
EOSINOPHIL # BLD AUTO: 0.24 K/UL — SIGNIFICANT CHANGE UP (ref 0–0.5)
EOSINOPHIL NFR BLD AUTO: 3.7 % — SIGNIFICANT CHANGE UP (ref 0–6)
GLUCOSE BLDC GLUCOMTR-MCNC: 102 MG/DL — HIGH (ref 70–99)
GLUCOSE BLDC GLUCOMTR-MCNC: 133 MG/DL — HIGH (ref 70–99)
GLUCOSE BLDC GLUCOMTR-MCNC: 147 MG/DL — HIGH (ref 70–99)
GLUCOSE BLDC GLUCOMTR-MCNC: 167 MG/DL — HIGH (ref 70–99)
GLUCOSE SERPL-MCNC: 75 MG/DL — SIGNIFICANT CHANGE UP (ref 70–99)
HCT VFR BLD CALC: 37.1 % — LOW (ref 39–50)
HGB BLD-MCNC: 11.6 G/DL — LOW (ref 13–17)
IANC: 3.61 K/UL — SIGNIFICANT CHANGE UP (ref 1.8–7.4)
IMM GRANULOCYTES NFR BLD AUTO: 4.9 % — HIGH (ref 0–0.9)
INR BLD: 1.22 RATIO — HIGH (ref 0.88–1.16)
LYMPHOCYTES # BLD AUTO: 1.17 K/UL — SIGNIFICANT CHANGE UP (ref 1–3.3)
LYMPHOCYTES # BLD AUTO: 18 % — SIGNIFICANT CHANGE UP (ref 13–44)
MAGNESIUM SERPL-MCNC: 2.1 MG/DL — SIGNIFICANT CHANGE UP (ref 1.6–2.6)
MCHC RBC-ENTMCNC: 27.4 PG — SIGNIFICANT CHANGE UP (ref 27–34)
MCHC RBC-ENTMCNC: 31.3 GM/DL — LOW (ref 32–36)
MCV RBC AUTO: 87.7 FL — SIGNIFICANT CHANGE UP (ref 80–100)
MONOCYTES # BLD AUTO: 1.07 K/UL — HIGH (ref 0–0.9)
MONOCYTES NFR BLD AUTO: 16.5 % — HIGH (ref 2–14)
NEUTROPHILS # BLD AUTO: 3.61 K/UL — SIGNIFICANT CHANGE UP (ref 1.8–7.4)
NEUTROPHILS NFR BLD AUTO: 55.5 % — SIGNIFICANT CHANGE UP (ref 43–77)
NRBC # BLD: 0 /100 WBCS — SIGNIFICANT CHANGE UP (ref 0–0)
NRBC # FLD: 0 K/UL — SIGNIFICANT CHANGE UP (ref 0–0)
PHOSPHATE SERPL-MCNC: 3.4 MG/DL — SIGNIFICANT CHANGE UP (ref 2.5–4.5)
PLATELET # BLD AUTO: 193 K/UL — SIGNIFICANT CHANGE UP (ref 150–400)
POTASSIUM SERPL-MCNC: 4.7 MMOL/L — SIGNIFICANT CHANGE UP (ref 3.5–5.3)
POTASSIUM SERPL-SCNC: 4.7 MMOL/L — SIGNIFICANT CHANGE UP (ref 3.5–5.3)
PROT SERPL-MCNC: 5.9 G/DL — LOW (ref 6–8.3)
PROTHROM AB SERPL-ACNC: 14.2 SEC — HIGH (ref 10.5–13.4)
RBC # BLD: 4.23 M/UL — SIGNIFICANT CHANGE UP (ref 4.2–5.8)
RBC # FLD: 15.5 % — HIGH (ref 10.3–14.5)
SODIUM SERPL-SCNC: 135 MMOL/L — SIGNIFICANT CHANGE UP (ref 135–145)
WBC # BLD: 6.5 K/UL — SIGNIFICANT CHANGE UP (ref 3.8–10.5)
WBC # FLD AUTO: 6.5 K/UL — SIGNIFICANT CHANGE UP (ref 3.8–10.5)

## 2023-01-16 PROCEDURE — 99239 HOSP IP/OBS DSCHRG MGMT >30: CPT

## 2023-01-16 PROCEDURE — 99232 SBSQ HOSP IP/OBS MODERATE 35: CPT

## 2023-01-16 RX ORDER — VALGANCICLOVIR 450 MG/1
1 TABLET, FILM COATED ORAL
Qty: 0 | Refills: 0 | DISCHARGE
Start: 2023-01-16

## 2023-01-16 RX ORDER — ACETAMINOPHEN 500 MG
2 TABLET ORAL
Qty: 0 | Refills: 0 | DISCHARGE
Start: 2023-01-16

## 2023-01-16 RX ORDER — INSULIN LISPRO 100/ML
VIAL (ML) SUBCUTANEOUS
Refills: 0 | Status: DISCONTINUED | OUTPATIENT
Start: 2023-01-16 | End: 2023-01-16

## 2023-01-16 RX ORDER — INSULIN LISPRO 100/ML
VIAL (ML) SUBCUTANEOUS AT BEDTIME
Refills: 0 | Status: DISCONTINUED | OUTPATIENT
Start: 2023-01-16 | End: 2023-01-16

## 2023-01-16 RX ADMIN — INSULIN GLARGINE 24 UNIT(S): 100 INJECTION, SOLUTION SUBCUTANEOUS at 12:33

## 2023-01-16 RX ADMIN — Medication 8 UNIT(S): at 07:34

## 2023-01-16 RX ADMIN — CARVEDILOL PHOSPHATE 25 MILLIGRAM(S): 80 CAPSULE, EXTENDED RELEASE ORAL at 05:12

## 2023-01-16 RX ADMIN — VALGANCICLOVIR 450 MILLIGRAM(S): 450 TABLET, FILM COATED ORAL at 05:12

## 2023-01-16 RX ADMIN — TACROLIMUS 1 MILLIGRAM(S): 5 CAPSULE ORAL at 12:27

## 2023-01-16 RX ADMIN — Medication 8 UNIT(S): at 17:06

## 2023-01-16 RX ADMIN — ISOSORBIDE MONONITRATE 30 MILLIGRAM(S): 60 TABLET, EXTENDED RELEASE ORAL at 11:32

## 2023-01-16 RX ADMIN — Medication 2.5 MILLIGRAM(S): at 05:11

## 2023-01-16 RX ADMIN — Medication 1 MILLIGRAM(S): at 11:32

## 2023-01-16 RX ADMIN — HEPARIN SODIUM 5000 UNIT(S): 5000 INJECTION INTRAVENOUS; SUBCUTANEOUS at 05:11

## 2023-01-16 RX ADMIN — Medication 1000 UNIT(S): at 11:31

## 2023-01-16 RX ADMIN — Medication 81 MILLIGRAM(S): at 11:31

## 2023-01-16 RX ADMIN — Medication 8 UNIT(S): at 11:25

## 2023-01-16 NOTE — PROGRESS NOTE ADULT - PROVIDER SPECIALTY LIST ADULT
Endocrinology
Hepatology
Infectious Disease
Nephrology
Nephrology
Hepatology
Hepatology
Nephrology
Endocrinology
Nephrology
Hospitalist
Endocrinology
Hospitalist
Endocrinology
Hospitalist

## 2023-01-16 NOTE — PROGRESS NOTE ADULT - PROBLEM SELECTOR PLAN 6
-improving, likely secondary to viral infection   -hepatitis panel neg  -RUQ Ultrasound showed no evidence for intrahepatic or extrahepatic biliary ductal dilatation. No gallstones, gallbladder wall thickening or pericholecystic fluid. avoid hepatotoxins  -trend LFT daily  -SHOAIB and EBV PCR neg   -no need for inpatient MRI abdomen   -Hepatology f/up

## 2023-01-16 NOTE — PROGRESS NOTE ADULT - PROBLEM SELECTOR PLAN 9
prophylactic lovenox
prophylactic lovenox    DC home, d/c time 34 minutes
prophylactic lovenox

## 2023-01-16 NOTE — DISCHARGE NOTE PROVIDER - CARE PROVIDER_API CALL
Maia Velázquez [facility]  Internal Medicine  [address] [place] [address]  Phone: [phone]  Fax: [phone]  Follow Up Time: 1 week

## 2023-01-16 NOTE — DISCHARGE NOTE NURSING/CASE MANAGEMENT/SOCIAL WORK - PATIENT PORTAL LINK FT
You can access the FollowMyHealth Patient Portal offered by [facility] by registering at the following website: [email or web address]. By joining [facility]’s FollowMyHealth portal, you will also be able to view your health information using other applications (apps) compatible with our system.

## 2023-01-16 NOTE — PROGRESS NOTE ADULT - PROBLEM SELECTOR PLAN 3
-hx right renal transplant in 2015, baseline creat appears to be 1.5-1.6 from Dec 2020  at home he takes tacrolimus 1mg am and 0.5mg pm, cellcept 1000mg bid, prednisone 2.5mg qd  -cellcept held in s/o infection  -c/w tacrolimus and Prednisone, Tacrolimus level 4.5   -daily BMP  -avoid nephrotoxins  -Renal sono showed Echogenic right native kidney with cortical thinning, suggestive of medical renal disease. No hydronephrosis. Nonvisualization of the native left kidney, which may be surgically absent. Right lower quadrant transplant kidney without hydronephrosis.  -f/u nephrology

## 2023-01-16 NOTE — PROGRESS NOTE ADULT - SUBJECTIVE AND OBJECTIVE BOX
Chief Complaint: DM 2    History: Patient seen at bedside, eating lunch at time of visit and tolerating. Denies n/v, denies s/s of hypoglycemia  FS trending within target, most recent 167 mg/dl     MEDICATIONS  (STANDING):  aspirin  chewable 81 milliGRAM(s) Oral daily  atorvastatin 20 milliGRAM(s) Oral at bedtime  carvedilol 25 milliGRAM(s) Oral every 12 hours  cholecalciferol 1000 Unit(s) Oral daily  dextrose 5%. 1000 milliLiter(s) (50 mL/Hr) IV Continuous <Continuous>  dextrose 5%. 1000 milliLiter(s) (100 mL/Hr) IV Continuous <Continuous>  dextrose 50% Injectable 25 Gram(s) IV Push once  dextrose 50% Injectable 12.5 Gram(s) IV Push once  dextrose 50% Injectable 25 Gram(s) IV Push once  folic acid 1 milliGRAM(s) Oral daily  glucagon  Injectable 1 milliGRAM(s) IntraMuscular once  heparin   Injectable 5000 Unit(s) SubCutaneous every 12 hours  insulin glargine Injectable (LANTUS) 24 Unit(s) SubCutaneous <User Schedule>  insulin lispro (ADMELOG) corrective regimen sliding scale   SubCutaneous three times a day before meals  insulin lispro (ADMELOG) corrective regimen sliding scale   SubCutaneous at bedtime  insulin lispro Injectable (ADMELOG) 8 Unit(s) SubCutaneous three times a day before meals  isosorbide   mononitrate ER Tablet (IMDUR) 30 milliGRAM(s) Oral daily  predniSONE   Tablet 2.5 milliGRAM(s) Oral daily  tacrolimus 0.5 milliGRAM(s) Oral at bedtime  tacrolimus 1 milliGRAM(s) Oral daily  valGANciclovir 450 milliGRAM(s) Oral two times a day    MEDICATIONS  (PRN):  acetaminophen     Tablet .. 650 milliGRAM(s) Oral every 6 hours PRN Temp greater or equal to 38C (100.4F), Mild Pain (1 - 3)  dextrose Oral Gel 15 Gram(s) Oral once PRN Blood Glucose LESS THAN 70 milliGRAM(s)/deciliter    No Known Allergies    Review of Systems:  Cardiovascular: No chest pain  Respiratory: No SOB  GI: No nausea, vomiting  Endocrine: no hypoglycemia    PHYSICAL EXAM:  VITALS: T(C): 36.3 (01-16-23 @ 09:55)  T(F): 97.3 (01-16-23 @ 09:55), Max: 98.2 (01-15-23 @ 21:25)  HR: 71 (01-16-23 @ 09:55) (66 - 72)  BP: 122/55 (01-16-23 @ 09:55) (113/60 - 143/61)  RR:  (18 - 19)  SpO2:  (95% - 97%)  Wt(kg): --  GENERAL: NAD  EYES: No proptosis, no lid lag, anicteric  HEENT:  Atraumatic, Normocephalic, moist mucous membranes  RESPIRATORY: unlabored respirations     CAPILLARY BLOOD GLUCOSE    POCT Blood Glucose.: 167 mg/dL (16 Jan 2023 12:32)  POCT Blood Glucose.: 147 mg/dL (16 Jan 2023 11:17)  POCT Blood Glucose.: 102 mg/dL (16 Jan 2023 07:24)  POCT Blood Glucose.: 155 mg/dL (15 Sudeep 2023 21:11)  POCT Blood Glucose.: 104 mg/dL (15 Sudeep 2023 17:02)      01-16    135  |  99  |  32<H>  ----------------------------<  75  4.7   |  28  |  1.31<H>    eGFR: 59<L>    Ca    9.5      01-16  Mg     2.10     01-16  Phos  3.4     01-16    TPro  5.9<L>  /  Alb  3.0<L>  /  TBili  0.9  /  DBili  x   /  AST  74<H>  /  ALT  179<H>  /  AlkPhos  147<H>  01-16      Thyroid Function Tests:  01-09 @ 09:50 TSH 1.42 FreeT4 -- T3 -- Anti TPO -- Anti Thyroglobulin Ab -- TSI --      A1C with Estimated Average Glucose Result: 7.7 % (01-09-23 @ 09:50)    Diet, Regular:   Consistent Carbohydrate No Snacks (CSTCHO)  DASH/TLC Sodium & Cholesterol Restricted (DASH) (01-09-23 @ 09:50) [Active]
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
## 2023-01-16 NOTE — DISCHARGE NOTE PROVIDER - HOSPITAL COURSE
Patient is a 67 year old male with history of HTN, DM-2, HLD, CAD w/ 1x NATALIE, CABG in 2005, R kidney transplant in 2015 at [facility] on prednisone, prograf and cellcept, presents to ED with 2 day hx of SOB, cough, hypoxia, found to have influenza and RLL pneumonia      ·  Problem: Acute respiratory failure with hypoxia.   ·  Plan: -Due to pneumonia   -s/p antibiotics   -Supplemental oxygen, currently on 2L nc and continue to wean down as tolerated.     ·  Problem: Influenza and pneumonia.   ·  Plan: -sepsis present on admission due to influenza and RLL pneumonia. Sepsis resolved   -on admission WBC 14.9, elevated lactate 6 -->3.4 after fluid bolus  -s/p Ceftriaxone/Zithromax -s/p Tamiflu   -urine legionella Ag neg   -LE doppler with no evidence of deep vein thrombosis in the right and left lower extremities  -Blood culture showed staph epi, likely contaminant.     ·  Problem: Renal transplant recipient.   ·  Plan: -hx right renal transplant in 2015, baseline creat appears to be 1.5-1.6 from Dec 2020  at home he takes tacrolimus 1mg am and 0.5mg pm, cellcept 1000mg bid, prednisone 2.5mg qd  -cellcept held in s/o infection  -continue with tacrolimus and Prednisone, Tacrolimus level 4.5   -daily BMP  -avoid nephrotoxins  -Renal sono showed Echogenic right native kidney with cortical thinning, suggestive of medical renal disease. No hydronephrosis. Nonvisualization of the native left kidney, which may be surgically absent. Right lower quadrant transplant kidney without hydronephrosis.  -follow up nephrology,     ·  Problem: CMV (cytomegalovirus) status positive.   ·  Plan: -positive CMV PCR in immunocompromised patient   -started on Valganciclovir as per ID.     ·  Problem: CAD (coronary artery disease).   ·  Plan: -hx CAD with stent x1, no chest pain  -continue with asa/statin, coreg, imdur  -monitor bp  -echo mild LV dysfunction, EF 47%, mild MR.     ·  Problem: Elevated LFTs.   ·  Plan: -improving, suspect DILI   -hepatitis panel neg  -RUQ Ultrasound showed no evidence for intrahepatic or extrahepatic biliary ductal dilatation. No gallstones, gallbladder wall thickening or pericholecystic fluid. avoid hepatotoxins  -trend LFT daily  -Hepatology following   -SHOAIB and EBV PCR neg    -Follow up [facility]  -awaiting MRI abdomen with contrast.    ·  Problem: HTN (hypertension).   ·  Plan: continue with Coreg and Imdur   Monitor BP.    ·  Problem: Diabetes.   ·  Plan: Poorly controlled transplant DM with steroid-induced hyperglycemia  Endo consult appreciated  A1c 7.7%  Continue ISS  Increased Lantus 24 units daily   Continue Admelog 8 units TID w/ meals   Monitor FS.     MRI of abdomen  to be arranged as out patient. Arrange with PMD. Patient is a 67 year old male with history of HTN, DM-2, HLD, CAD w/ 1x NATALIE, CABG in 2005, R kidney transplant in 2015 at [facility] on prednisone, prograf and cellcept, presents to ED with 2 day hx of SOB, cough, hypoxia, found to have influenza and RLL pneumonia      ·  Problem: Acute respiratory failure with hypoxia.   ·  Plan: -Due to pneumonia     -s/p antibiotics   -required supplemental oxygen, now weaned off and sating well on RA      ·  Problem: Influenza and pneumonia.   ·  Plan: -sepsis present on admission due to influenza and RLL pneumonia. Sepsis resolved   -on admission WBC 14.9, elevated lactate 6 -->3.4 after fluid bolus  -s/p Ceftriaxone/Zithromax   -s/p Tamiflu   -urine legionella Ag neg   -LE doppler with no evidence of deep vein thrombosis in the right and left lower extremities  -Blood culture showed staph epi, likely contaminant.     ·  Problem: Renal transplant recipient.   ·  Plan: -hx right renal transplant in 2015, baseline creat appears to be 1.5-1.6 from Dec 2020  at home he takes tacrolimus 1mg am and 0.5mg pm, cellcept 1000mg bid, prednisone 2.5mg qd  -cellcept held in s/o infection, will restart on discharge   -continue with tacrolimus and Prednisone, Tacrolimus level 4.5   -avoid nephrotoxins  -Renal sono showed Echogenic right native kidney with cortical thinning, suggestive of medical renal disease. No hydronephrosis. Nonvisualization of the native left kidney, which may be surgically absent. Right lower quadrant transplant kidney without hydronephrosis.  -follow up nephrology     ·  Problem: CMV (cytomegalovirus) status positive.   ·  Plan: -positive CMV PCR in immunocompromised patient   -started on Valganciclovir as per ID, will complete 2 week course      ·  Problem: CAD (coronary artery disease).   ·  Plan: -hx CAD with stent x1, no chest pain  -continue with asa/statin, coreg, imdur  -monitor bp  -echo mild LV dysfunction, EF 47%, mild MR.     ·  Problem: Elevated LFTs.   ·  Plan: -improving, likely secondary to viral infection   -LFT's significantly improved   -hepatitis panel neg  -RUQ Ultrasound showed no evidence for intrahepatic or extrahepatic biliary ductal dilatation. No gallstones, gallbladder wall thickening or pericholecystic fluid. avoid hepatotoxins  -SHOAIB and EBV PCR neg   -no indication for inpatient MRI abdomen  -Hepatology f/up     ·  Problem: HTN (hypertension).   ·  Plan: continue with Coreg and Imdur   Monitor BP.    ·  Problem: Diabetes.   ·  Plan: Poorly controlled transplant DM with steroid-induced hyperglycemia  Endo consult appreciated  A1c 7.7%  Continue ISS  Increased Lantus 24 units daily   Continue Admelog 8 units TID w/ meals   Monitor FS.     MRI of abdomen  to be arranged as out patient. Arrange with PMD.

## 2023-01-16 NOTE — DISCHARGE NOTE NURSING/CASE MANAGEMENT/SOCIAL WORK - NSDCPEFALRISK_GEN_ALL_CORE
For information on Fall & Injury Prevention, visit: [email or web address] OR  [email or web address] OR  https://www.cdc.gov/steadi/patient.html

## 2023-01-16 NOTE — PROGRESS NOTE ADULT - SUBJECTIVE AND OBJECTIVE BOX
PROGRESS NOTE:     Patient is a 67y old  Male who presents with a chief complaint of sob, flu, pneumonia, sepsis (15 Sudeep 2023 17:16)      SUBJECTIVE / OVERNIGHT EVENTS: No complaints.     ADDITIONAL REVIEW OF SYSTEMS:    MEDICATIONS  (STANDING):  aspirin  chewable 81 milliGRAM(s) Oral daily  atorvastatin 20 milliGRAM(s) Oral at bedtime  carvedilol 25 milliGRAM(s) Oral every 12 hours  cholecalciferol 1000 Unit(s) Oral daily  dextrose 5%. 1000 milliLiter(s) (50 mL/Hr) IV Continuous <Continuous>  dextrose 5%. 1000 milliLiter(s) (100 mL/Hr) IV Continuous <Continuous>  dextrose 50% Injectable 25 Gram(s) IV Push once  dextrose 50% Injectable 12.5 Gram(s) IV Push once  dextrose 50% Injectable 25 Gram(s) IV Push once  folic acid 1 milliGRAM(s) Oral daily  glucagon  Injectable 1 milliGRAM(s) IntraMuscular once  heparin   Injectable 5000 Unit(s) SubCutaneous every 12 hours  insulin glargine Injectable (LANTUS) 24 Unit(s) SubCutaneous <User Schedule>  insulin lispro (ADMELOG) corrective regimen sliding scale   SubCutaneous three times a day before meals  insulin lispro (ADMELOG) corrective regimen sliding scale   SubCutaneous at bedtime  insulin lispro Injectable (ADMELOG) 8 Unit(s) SubCutaneous three times a day before meals  isosorbide   mononitrate ER Tablet (IMDUR) 30 milliGRAM(s) Oral daily  predniSONE   Tablet 2.5 milliGRAM(s) Oral daily  tacrolimus 0.5 milliGRAM(s) Oral at bedtime  tacrolimus 1 milliGRAM(s) Oral daily  valGANciclovir 450 milliGRAM(s) Oral two times a day    MEDICATIONS  (PRN):  acetaminophen     Tablet .. 650 milliGRAM(s) Oral every 6 hours PRN Temp greater or equal to 38C (100.4F), Mild Pain (1 - 3)  dextrose Oral Gel 15 Gram(s) Oral once PRN Blood Glucose LESS THAN 70 milliGRAM(s)/deciliter      CAPILLARY BLOOD GLUCOSE      POCT Blood Glucose.: 167 mg/dL (16 Jan 2023 12:32)  POCT Blood Glucose.: 147 mg/dL (16 Jan 2023 11:17)  POCT Blood Glucose.: 102 mg/dL (16 Jan 2023 07:24)  POCT Blood Glucose.: 155 mg/dL (15 Sudeep 2023 21:11)  POCT Blood Glucose.: 104 mg/dL (15 Sudeep 2023 17:02)    I&O's Summary      PHYSICAL EXAM:  Vital Signs Last 24 Hrs  T(C): 36.3 (16 Jan 2023 09:55), Max: 36.8 (15 Sudeep 2023 21:25)  T(F): 97.3 (16 Jan 2023 09:55), Max: 98.2 (15 Sudeep 2023 21:25)  HR: 71 (16 Jan 2023 09:55) (66 - 72)  BP: 122/55 (16 Jan 2023 09:55) (113/60 - 143/61)  BP(mean): --  RR: 19 (16 Jan 2023 09:55) (18 - 19)  SpO2: 97% (16 Jan 2023 09:55) (95% - 97%)    Parameters below as of 16 Jan 2023 09:55  Patient On (Oxygen Delivery Method): room air      EYES: EOMI, PERRLA, conjunctiva and sclera clear  NECK: Supple, No JVD  CHEST/LUNG: right basilar crackle and decreased BS, otherwise clear  HEART: Regular rate and rhythm; No murmurs, rubs, or gallops  ABDOMEN: Soft, Nontender, Nondistended; Bowel sounds present  EXTREMITIES:  2+ Peripheral Pulses, No clubbing, cyanosis, or edema  PSYCH: AAOx3  NEUROLOGY: non-focal, CN grossly intact  SKIN: No rashes or lesions      LABS:                        11.6   6.50  )-----------( 193      ( 16 Jan 2023 05:20 )             37.1     01-16    135  |  99  |  32<H>  ----------------------------<  75  4.7   |  28  |  1.31<H>    Ca    9.5      16 Jan 2023 05:20  Phos  3.4     01-16  Mg     2.10     01-16    TPro  5.9<L>  /  Alb  3.0<L>  /  TBili  0.9  /  DBili  x   /  AST  74<H>  /  ALT  179<H>  /  AlkPhos  147<H>  01-16    PT/INR - ( 16 Jan 2023 05:20 )   PT: 14.2 sec;   INR: 1.22 ratio                     RADIOLOGY & ADDITIONAL TESTS:  Results Reviewed:   Imaging Personally Reviewed:  Electrocardiogram Personally Reviewed:    COORDINATION OF CARE:  Care Discussed with Consultants/Other Providers [Y/N]:  Prior or Outpatient Records Reviewed [Y/N]:

## 2023-01-16 NOTE — PROGRESS NOTE ADULT - ASSESSMENT
66 yo male with h/o HTN, DM-2, HLD, CAD w/ 1x NATALIE, CABG in 2005, R kidney transplant in 2015 at [facility] on prednisone, prograf and cellcept, presents to ED with 2 day hx of SOB, found to be flu +, desating requiring bipap    Kidney transplant recipient  s/p right kidney transplant 2015 at [facility]  Prograf 1 mg in AM and 0.5 mg in the evening, Prednisone2.5mg BID, and MMF 1 gm po BID. Recommend to hold MMF while pt has the flu.  Tacrolimus level 1/10 at goal. Goal 4-7  would hold cellcept right now    IZZY  IZZY likely sec to prerenal  relatively stable  will need optimize dm control    hyponatremia  Improving  due to hyperglycemia  optimize dm control  monitor    hypophos  supplement as needed  monitor    Influenza A  care per team

## 2023-01-16 NOTE — PROGRESS NOTE ADULT - ASSESSMENT
68 yo male with h/o HTN, transplant DM, HLD, CAD w/ 1x NATALIE, CABG in 2005, R kidney transplant in 2015 at [facility] on prednisone, prograf and cellcept, presents to ED with 2 day hx of SOB, a/w cough, +sick contact with family members have flu, denies chest pain/fever/chill. Endocrine consulted for transplant DM with steroid-induced hyperglycemia. He had some mild DKA initially but this resolved quickly. Received methylprednisolone 125mg x1 early morning on 1/9. Now 2.5mg prednisone daily, which is his home dose.    1. Poorly controlled transplant DM with steroid-induced hyperglycemia  DM diagnosis: Transplant DM, diagnosed after transplant around 2015 or 2016  Last A1c: 7.7  Endocrinologist: None  Home DM meds: Lantus 16 units every morning, Novolog 6 units TID (normally takes BID, lunch and dinner), Metformin 500mg daily and Alogliptin 25mg daily (wife assists)    While inpatient:  BG target 100-180 mg/dl  Within target, glucose tightly controlled on serum this AM, has been stable for several days on this regimen & patient already received his Lantus dose at 12 PM today - continue to monitor on current dosing for now  Continue Lantus 24 units daily at 12 PM  Continue Admelog to 8 units SQ TID before meals (Hold if NPO/skips meal)   Reduce Admelog correctional scale to LOW dose before meals and low dose at bedtime  Fingerstick BG before meals and bedtime  Carbohydrate consistent diet  Hypoglycemia protocol     Discharge Plan:   Likely to discharge patient home on basal/bolus insulin (dose TBD)  Counseled him and his wife that he should be taking rapid acting insulin 3x per day before meals   Lactate elevated and liver enzymes deranged - will most likely STOP Metformin - TBD  Recommend routine outpatient ophthalmology, podiatry and PCP followup     2. HTN  On Carvedilol, Isosorbide   Management per primary team/nephro.    3. HLD  On Atorvastatin 20mg daily - consider holding in setting of elevated LFTs    4. Vitamin D deficiency  Vitamin D 25OH resulted 36.9 WDL  Continue Cholecalciferol 1000 units daily (home med)    Thuy Burns  Nurse Practitioner  Division of Endocrinology & Diabetes  In house pager #[phone]/long range pager #[phone]    If before 9AM or after 6PM, or on weekends/holidays, please call endocrine answering service for assistance ([phone]).  For nonurgent matters email [email or web address] for assistance.

## 2023-01-16 NOTE — PROGRESS NOTE ADULT - SUBJECTIVE AND OBJECTIVE BOX
[facility]   MD HUNTER BONILLA MD KRISTINE SOLTANPOUR, DO ANGELA WONG, PA    TEL:  OFFICE: [phone]  From 5pm-7am Answering Service [phone]    -- RENAL FOLLOW UP NOTE ---Date of Service 01-16-23 @ 14:51    Patient is a 67y old  Male who presents with a chief complaint of sob, flu, pneumonia, sepsis (16 Jan 2023 14:39)      Patient seen and examined at bedside. No chest pain/sob    VITALS:  T(F): 97.3 (01-16-23 @ 09:55), Max: 98.2 (01-15-23 @ 21:25)  HR: 71 (01-16-23 @ 09:55)  BP: 122/55 (01-16-23 @ 09:55)  RR: 19 (01-16-23 @ 09:55)  SpO2: 97% (01-16-23 @ 09:55)  Wt(kg): --        PHYSICAL EXAM:  General: NAD  Neck: No JVD  Respiratory: CTAB, no wheezes, rales or rhonchi  Cardiovascular: S1, S2, RRR  Gastrointestinal: BS+, soft, NT/ND  Extremities: No peripheral edema    Hospital Medications:   MEDICATIONS  (STANDING):  aspirin  chewable 81 milliGRAM(s) Oral daily  atorvastatin 20 milliGRAM(s) Oral at bedtime  carvedilol 25 milliGRAM(s) Oral every 12 hours  cholecalciferol 1000 Unit(s) Oral daily  dextrose 5%. 1000 milliLiter(s) (50 mL/Hr) IV Continuous <Continuous>  dextrose 5%. 1000 milliLiter(s) (100 mL/Hr) IV Continuous <Continuous>  dextrose 50% Injectable 25 Gram(s) IV Push once  dextrose 50% Injectable 12.5 Gram(s) IV Push once  dextrose 50% Injectable 25 Gram(s) IV Push once  folic acid 1 milliGRAM(s) Oral daily  glucagon  Injectable 1 milliGRAM(s) IntraMuscular once  heparin   Injectable 5000 Unit(s) SubCutaneous every 12 hours  insulin glargine Injectable (LANTUS) 24 Unit(s) SubCutaneous <User Schedule>  insulin lispro (ADMELOG) corrective regimen sliding scale   SubCutaneous three times a day before meals  insulin lispro (ADMELOG) corrective regimen sliding scale   SubCutaneous at bedtime  insulin lispro Injectable (ADMELOG) 8 Unit(s) SubCutaneous three times a day before meals  isosorbide   mononitrate ER Tablet (IMDUR) 30 milliGRAM(s) Oral daily  predniSONE   Tablet 2.5 milliGRAM(s) Oral daily  tacrolimus 0.5 milliGRAM(s) Oral at bedtime  tacrolimus 1 milliGRAM(s) Oral daily  valGANciclovir 450 milliGRAM(s) Oral two times a day      LABS:  01-16    135  |  99  |  32<H>  ----------------------------<  75  4.7   |  28  |  1.31<H>    Ca    9.5      16 Jan 2023 05:20  Phos  3.4     01-16  Mg     2.10     01-16    TPro  5.9<L>  /  Alb  3.0<L>  /  TBili  0.9  /  DBili      /  AST  74<H>  /  ALT  179<H>  /  AlkPhos  147<H>  01-16    Creatinine Trend: 1.31 <--, 1.15 <--, 1.19 <--, 1.29 <--, 1.31 <--, 1.54 <--, 1.41 <--    Albumin, Serum: 3.0 g/dL (01-16 @ 05:20)  Phosphorus Level, Serum: 3.4 mg/dL (01-16 @ 05:20)                              11.6   6.50  )-----------( 193      ( 16 Jan 2023 05:20 )             37.1     Urine Studies:      Vitamin D (25OH) 36.9      [01-11-23 @ 06:30]  TSH 1.42      [01-09-23 @ 09:50]    HBsAg Nonreact      [01-09-23 @ 09:50]  HCV 0.17, Nonreact      [01-10-23 @ 06:00]    SHOAIB: titer Negative, pattern --      [01-12-23 @ 05:00]    RADIOLOGY & ADDITIONAL STUDIES:

## 2023-01-16 NOTE — DISCHARGE NOTE PROVIDER - NSDCCPCAREPLAN_GEN_ALL_CORE_FT
PRINCIPAL DISCHARGE DIAGNOSIS  Diagnosis: Pneumonia due to virus  Assessment and Plan of Treatment: follow with PMD. Continue medications as prescribed  If shortness of breath occurs contact your MD or go to ER.         SECONDARY DISCHARGE DIAGNOSES  Diagnosis: Influenza due to other identified influenza virus with the same other identified influenza virus pneumonia  Assessment and Plan of Treatment: Follow up with PMD. Continue meducations as prescribed.    Diagnosis: Acute respiratory failure with hypoxia  Assessment and Plan of Treatment:     Diagnosis: CMV (cytomegalovirus infection) status unknown  Assessment and Plan of Treatment: Follow with PMD. Continue medications as prescribed.     PRINCIPAL DISCHARGE DIAGNOSIS  Diagnosis: Pneumonia due to virus  Assessment and Plan of Treatment: Completed Tamiflu for Influenza. Completed course of antibiotics for possible bacterial pneumonia. Follow up with PCP.         SECONDARY DISCHARGE DIAGNOSES  Diagnosis: Acute respiratory failure with hypoxia  Assessment and Plan of Treatment: Due to pneumonia. Weaned off oxygen.    Diagnosis: CMV (cytomegalovirus infection) status unknown  Assessment and Plan of Treatment: Complete 2 week course of Valcyte.    Diagnosis: Elevated LFTs  Assessment and Plan of Treatment: Likely due to viral infection. Trended down. Follow up with PCP, can get MRI abdomen done as outpatient.    Diagnosis: Renal transplant recipient  Assessment and Plan of Treatment: Resume Cellcept. Continue Prograf and Prednisone.    Diagnosis: Diabetes  Assessment and Plan of Treatment: Continue insulin regimen and monitor fingersticks. Follow up with PCP.

## 2023-01-19 LAB
CULTURE RESULTS: SIGNIFICANT CHANGE UP
SPECIMEN SOURCE: SIGNIFICANT CHANGE UP

## 2024-02-19 NOTE — ED ADULT NURSE NOTE - DRUG PRE-SCREENING (DAST -1)
Group Therapy Note    Date: 2/19/2024    Group Start Time: 1100  Group End Time: 1145  Group Topic: Cognitive Skills    Yaa Bueno CTRS        Group Therapy Note    Attendees: 7/14     Patient's Goal: Topic: To increase social interaction and to practice decision making, and   communication skills.      Notes:  Pt was pleasant and cooperative, and participated fully in group task. Pt was able to   practice decision making skills, and communication skills with RT and peers independently.         Status After Intervention:  Improved     Participation Level: Active Listener and Interactive r/t  task and topic, supportive      Participation Quality: Appropriate, Attentive, engaged r/t task and topic, supportive     Speech:  normal .       Thought Process/Content: Logical, linear r/t task and topic .       Affective Functioning: Congruent, brightened .        Mood: Cooperative, Euthymic      Level of consciousness:  Alert, and Attentive      Response to Learning: Able to verbalize current knowledge/experience, Able to   verbalize/acknowledge new learning, and Progressing to goal      Endings: None Reported      Modes of Intervention: Education, Support, Socialization, and Problem-solving    Discipline Responsible: Psychoeducational Specialist      Signature:  CIPRIANO BARRIOS   Statement Selected

## 2024-10-09 NOTE — DISCHARGE NOTE NURSING/CASE MANAGEMENT/SOCIAL WORK - NSTRANSFERBELONGINGSRESP_GEN_A_NUR
participation throughout session    Patient. Education:  [] Plans/Goals, Risks/Benefits discussed  [] Home exercise program  Method of Education: [x] Verbal  [x] Demo  [] Written  Comprehension of Education:  [x] Verbalizes understanding.  [x] Demonstrates understanding.  [] Needs Review.  [] Demonstrates/verbalizes understanding of HEP/Ed previously given.      Time In: 3:30pm           Time Out: 4:30pm           CODE  Minutes  Units   63176 Fluidotherapy     02822 Paraffin     98835 Ultrasound     86629 Electrical Stim - Attended     67936 Iontophoresis     17588 Therapeutic Ex 15 1   87351 Therapeutic Activity 45 3   31984 Neuromuscular Re-Ed     18125 Manual Therapy     70003 ADL/COMP Tech Train     25941 Orthotic Management/Training      Other                 Total  60 4       Plan: OT 1-2x / week for 12 visits.   Certification period From: 9/30/24  To: 12/30/24     []  Continues Plan of care with focus on AE/compensatory/adaptive strategies during ADL/IADLs, spatial awareness, spatial relations, visual scanning, and ADL/IADL participation for reduced need for assistance with daily activities : Treatment covered based on POC and graduated to patient's progress. Pt education continues at each visit to obtain maximum benefits from skilled OT intervention.  [x]  Alter Plan of care: Recert sent for updated POC for up to 12 more visits  []  Discharge:      Jaleesa KWOK, [id number]             yes